# Patient Record
Sex: FEMALE | Race: WHITE | NOT HISPANIC OR LATINO | Employment: FULL TIME | URBAN - METROPOLITAN AREA
[De-identification: names, ages, dates, MRNs, and addresses within clinical notes are randomized per-mention and may not be internally consistent; named-entity substitution may affect disease eponyms.]

---

## 2017-07-17 ENCOUNTER — ALLSCRIPTS OFFICE VISIT (OUTPATIENT)
Dept: OTHER | Facility: OTHER | Age: 22
End: 2017-07-17

## 2017-07-17 PROCEDURE — G0145 SCR C/V CYTO,THINLAYER,RESCR: HCPCS | Performed by: PHYSICIAN ASSISTANT

## 2017-07-18 ENCOUNTER — LAB REQUISITION (OUTPATIENT)
Dept: LAB | Facility: HOSPITAL | Age: 22
End: 2017-07-18
Payer: COMMERCIAL

## 2017-07-18 DIAGNOSIS — Z01.419 ENCOUNTER FOR GYNECOLOGICAL EXAMINATION WITHOUT ABNORMAL FINDING: ICD-10-CM

## 2017-07-24 LAB
LAB AP GYN PRIMARY INTERPRETATION: NORMAL
LAB AP LMP: NORMAL
Lab: NORMAL

## 2017-11-14 ENCOUNTER — ALLSCRIPTS OFFICE VISIT (OUTPATIENT)
Dept: OTHER | Facility: OTHER | Age: 22
End: 2017-11-14

## 2017-11-16 NOTE — PROGRESS NOTES
Assessment    1  Folliculitis (074 8) (W57 9)    Discussion/Summary  Discussion Summary:   Folliculitiswarm soaks and try to express morerecollects RTO for I&D/excision  Chief Complaint  Chief Complaint Free Text Note Form: pt is here today for possible blocked hair follicle or skin tag      History of Present Illness  HPI: 26 y/o female c/o vuvlar lesion on R labia x 1 wk  Pt thought was pimple and tried to pop it but nothing came out  Pt denies any pain, vaginal d/c, vaginal odor, or vaginal itching  Pt has never had a similar lesion  Review of Systems  Focused-Female:  Constitutional: No fever, no chills, feels well, no tiredness, no recent weight gain or loss  Genitourinary: as noted in HPI,-- no dysuria-- and-- no vaginal discharge  Active Problems  1  ADD (attention deficit disorder) (314 00) (F98 8)   2  Asthma (493 90) (J45 909)   3  Dysmenorrhea in adolescent (625 3) (N94 6)   4  Encounter for gynecological examination without abnormal finding (V72 31) (Z01 419)   5  Scoliosis (737 30) (M41 9)    Past Medical History  1  History of dysmenorrhea (V13 29) (Z87 42)   2  History of ovarian cyst (V13 29) (Z87 42)  Active Problems And Past Medical History Reviewed: The active problems and past medical history were reviewed and updated today  Surgical History  1  History of Knee Surgery   2  History of Tonsillectomy   3  History of Wrist Surgery  Surgical History Reviewed: The surgical history was reviewed and updated today  Family History  Mother    1  No pertinent family history  Paternal Grandmother    2  Family history of diabetes mellitus (V18 0) (Z83 3)   3  Family history of malignant neoplasm of breast (V16 3) (Z80 3)  Paternal Aunt    4  Family history of malignant neoplasm of breast (V16 3) (Z80 3)   5  Family history of malignant neoplasm of thyroid (V16 8) (Z80 8)  Family History Reviewed: The family history was reviewed and updated today         Social History     ·

## 2017-11-21 ENCOUNTER — APPOINTMENT (OUTPATIENT)
Dept: RADIOLOGY | Facility: MEDICAL CENTER | Age: 22
End: 2017-11-21
Payer: COMMERCIAL

## 2017-11-21 ENCOUNTER — TRANSCRIBE ORDERS (OUTPATIENT)
Dept: ADMINISTRATIVE | Facility: HOSPITAL | Age: 22
End: 2017-11-21

## 2017-11-21 DIAGNOSIS — R07.81 PLEURODYNIA: Primary | ICD-10-CM

## 2017-11-21 PROCEDURE — 71020 HB CHEST X-RAY 2VW FRONTAL&LATL: CPT

## 2018-01-04 ENCOUNTER — APPOINTMENT (OUTPATIENT)
Dept: PHYSICAL THERAPY | Facility: MEDICAL CENTER | Age: 23
End: 2018-01-04
Payer: COMMERCIAL

## 2018-01-04 PROCEDURE — G8991 OTHER PT/OT GOAL STATUS: HCPCS

## 2018-01-04 PROCEDURE — G8990 OTHER PT/OT CURRENT STATUS: HCPCS

## 2018-01-04 PROCEDURE — 97162 PT EVAL MOD COMPLEX 30 MIN: CPT

## 2018-01-08 ENCOUNTER — APPOINTMENT (OUTPATIENT)
Dept: PHYSICAL THERAPY | Facility: MEDICAL CENTER | Age: 23
End: 2018-01-08
Payer: COMMERCIAL

## 2018-01-09 ENCOUNTER — APPOINTMENT (OUTPATIENT)
Dept: PHYSICAL THERAPY | Facility: MEDICAL CENTER | Age: 23
End: 2018-01-09
Payer: COMMERCIAL

## 2018-01-09 PROCEDURE — 97112 NEUROMUSCULAR REEDUCATION: CPT

## 2018-01-09 PROCEDURE — 97140 MANUAL THERAPY 1/> REGIONS: CPT

## 2018-01-11 ENCOUNTER — APPOINTMENT (OUTPATIENT)
Dept: PHYSICAL THERAPY | Facility: MEDICAL CENTER | Age: 23
End: 2018-01-11
Payer: COMMERCIAL

## 2018-01-11 PROCEDURE — 97110 THERAPEUTIC EXERCISES: CPT

## 2018-01-11 PROCEDURE — 97140 MANUAL THERAPY 1/> REGIONS: CPT

## 2018-01-12 ENCOUNTER — APPOINTMENT (OUTPATIENT)
Dept: PHYSICAL THERAPY | Facility: MEDICAL CENTER | Age: 23
End: 2018-01-12
Payer: COMMERCIAL

## 2018-01-13 VITALS
DIASTOLIC BLOOD PRESSURE: 60 MMHG | WEIGHT: 131 LBS | HEIGHT: 68 IN | BODY MASS INDEX: 19.85 KG/M2 | SYSTOLIC BLOOD PRESSURE: 96 MMHG

## 2018-01-14 VITALS
WEIGHT: 138 LBS | BODY MASS INDEX: 20.92 KG/M2 | SYSTOLIC BLOOD PRESSURE: 116 MMHG | DIASTOLIC BLOOD PRESSURE: 80 MMHG | HEIGHT: 68 IN

## 2018-01-16 ENCOUNTER — APPOINTMENT (OUTPATIENT)
Dept: PHYSICAL THERAPY | Facility: MEDICAL CENTER | Age: 23
End: 2018-01-16
Payer: COMMERCIAL

## 2018-01-16 PROCEDURE — 97140 MANUAL THERAPY 1/> REGIONS: CPT

## 2018-01-16 PROCEDURE — 97112 NEUROMUSCULAR REEDUCATION: CPT

## 2018-01-19 ENCOUNTER — APPOINTMENT (OUTPATIENT)
Dept: PHYSICAL THERAPY | Facility: MEDICAL CENTER | Age: 23
End: 2018-01-19
Payer: COMMERCIAL

## 2018-01-19 PROCEDURE — 97112 NEUROMUSCULAR REEDUCATION: CPT

## 2018-01-19 PROCEDURE — 97140 MANUAL THERAPY 1/> REGIONS: CPT

## 2018-01-22 ENCOUNTER — APPOINTMENT (OUTPATIENT)
Dept: PHYSICAL THERAPY | Facility: MEDICAL CENTER | Age: 23
End: 2018-01-22
Payer: COMMERCIAL

## 2018-01-22 PROCEDURE — 97140 MANUAL THERAPY 1/> REGIONS: CPT

## 2018-01-22 PROCEDURE — 97112 NEUROMUSCULAR REEDUCATION: CPT

## 2018-01-25 ENCOUNTER — OFFICE VISIT (OUTPATIENT)
Dept: PHYSICAL THERAPY | Facility: MEDICAL CENTER | Age: 23
End: 2018-01-25
Payer: COMMERCIAL

## 2018-01-25 DIAGNOSIS — M75.91 LESION OF RIGHT SHOULDER: Primary | ICD-10-CM

## 2018-01-25 PROCEDURE — 97140 MANUAL THERAPY 1/> REGIONS: CPT | Performed by: PHYSICAL THERAPIST

## 2018-01-25 PROCEDURE — 97110 THERAPEUTIC EXERCISES: CPT | Performed by: PHYSICAL THERAPIST

## 2018-01-25 NOTE — PROGRESS NOTES
Daily Note     Today's date: 2018  Patient name: Linda Young  : 1995  MRN: [id number]  Referring provider: Nick Drew MD  Dx:   Encounter Diagnosis   Name Primary?  Lesion of right shoulder Yes                  Subjective: Pt notes that she is doing well today, and overall she is feeling better over the past few days  Objective: See treatment diary below      Assessment: Tolerated treatment well  Patient demonstrated fatigue post treatment, exhibited good technqiue with therapeutic exercises, could benefit from continued PT and progressed patient with additional [address] activities  She remains limited with [employer] compensation when challenged  Will continue to work on form  Plan: Continue per plan of care  and Progress treatment as tolerated         Precautions: asthma, ADD    Daily Treatment Diary     Manual  18            ld PROM 10'                                                                    Exercise Diary  18            pulley's 5'            Tb rows/ext GTB 20x ea            S/l shld ER 1# 30x            Sleeper stretch 10" x10            Prone I, T's 5" x15 ea            Low trap lifts RTB 15x            Ball on wall circles Cw/CCw 20x            tband ER RTB 20x              Supine alphabet 1# 2x                                                                                                                                                                Modalities              CP PRN

## 2018-01-29 ENCOUNTER — OFFICE VISIT (OUTPATIENT)
Dept: PHYSICAL THERAPY | Facility: MEDICAL CENTER | Age: 23
End: 2018-01-29
Payer: COMMERCIAL

## 2018-01-29 DIAGNOSIS — M75.91 LESION OF RIGHT SHOULDER: Primary | ICD-10-CM

## 2018-01-29 PROCEDURE — 97110 THERAPEUTIC EXERCISES: CPT | Performed by: PHYSICAL THERAPIST

## 2018-01-29 PROCEDURE — 97140 MANUAL THERAPY 1/> REGIONS: CPT | Performed by: PHYSICAL THERAPIST

## 2018-01-29 NOTE — PROGRESS NOTES
Daily Note     Today's date: 2018  Patient name: Linda Young  : 1995  MRN: [id number]  Referring provider: Nick Drew MD  Dx:   Encounter Diagnosis   Name Primary?  Lesion of right shoulder Yes                  Subjective: Pt reports that she is more sore today  She was very active over the weekend, between making periogies and painting  Objective: See treatment diary below      Assessment: Tolerated treatment well  Patient demonstrated fatigue post treatment, could benefit from continued PT and did not progress with OH activities due to irritation from this weekend  Will progress NV with TE as charged  Plan: Continue per plan of care  and Progress treatment as tolerated         Precautions: asthma, ADD    Daily Treatment Diary     Manual  18           shld PROM 10' 10'                                                                   Exercise Diary  18           pulley's 5' 5'           Tb rows/ext GTB 20x ea GTB 20x ea           S/l shld ER 1# 30x 1# 30x           Sleeper stretch 10" x10 10" x10           Prone I, T's 5" x15 ea 5" x15 ea           Low trap lifts RTB 15x RTB 15x           Ball on wall circles Cw/CCw 20x CW/CCW 20x           tband ER RTB 20x   RTB 20x           Supine alphabet 1# 2x  1# 2x           Bicep curls  5# 10x                                                                                                                                                 Modalities              CP PRN

## 2018-02-01 ENCOUNTER — OFFICE VISIT (OUTPATIENT)
Dept: PHYSICAL THERAPY | Facility: MEDICAL CENTER | Age: 23
End: 2018-02-01
Payer: COMMERCIAL

## 2018-02-01 DIAGNOSIS — M75.91 LESION OF RIGHT SHOULDER: Primary | ICD-10-CM

## 2018-02-01 PROCEDURE — 97110 THERAPEUTIC EXERCISES: CPT | Performed by: PHYSICAL THERAPIST

## 2018-02-01 PROCEDURE — 97140 MANUAL THERAPY 1/> REGIONS: CPT | Performed by: PHYSICAL THERAPIST

## 2018-02-01 NOTE — PROGRESS NOTES
Daily Note     Today's date: 2018  Patient name: Oly Johns  : 1995  MRN: [id number]  Referring provider: Saurabh Camarillo MD  Dx:   Encounter Diagnosis   Name Primary?  Lesion of right shoulder Yes       Start Time: 164  Stop Time: 1730  Total time in clinic (min): 45 minutes    Subjective: Pt notes that she is feeling well today, and denies significant pain  Objective: See treatment diary below      Assessment: Tolerated treatment well  Patient demonstrated fatigue post treatment, would benefit from continued PT and was challenged with [facility] [employer] press  Plan: Continue per plan of care  and Progress treatment as tolerated         Precautions: asthma, ADD    Daily Treatment Diary     Manual            shld PROM 10' 10' 10'                                                                   Exercise Diary            pulley's 5' 5' 5'          Tb rows/ext GTB 20x ea GTB 20x ea GTB 20x          S/l shld ER 1# 30x 1# 30x 1# 30x          Sleeper stretch 10" x10 10" x10 10" x10          Prone I, T's 5" x15 ea 5" x15 ea 5" x15 1# ea          Low trap lifts RTB 15x RTB 15x RTB 15x          Ball on wall circles Cw/CCw 20x CW/CCW 20x CW/CCW 20x          tband ER RTB 20x   RTB 20x RTB 20x          Supine alphabet 1# 2x  1# 2x 1# 2x           Bicep curls  5# 10x 5# 15x          KB press   3# 10x                                                                                                                                   Modalities              CP PRN

## 2018-02-07 ENCOUNTER — APPOINTMENT (OUTPATIENT)
Dept: PHYSICAL THERAPY | Facility: MEDICAL CENTER | Age: 23
End: 2018-02-07
Payer: COMMERCIAL

## 2018-02-08 ENCOUNTER — OFFICE VISIT (OUTPATIENT)
Dept: PHYSICAL THERAPY | Facility: MEDICAL CENTER | Age: 23
End: 2018-02-08
Payer: COMMERCIAL

## 2018-02-08 DIAGNOSIS — M75.91 LESION OF RIGHT SHOULDER: Primary | ICD-10-CM

## 2018-02-08 PROCEDURE — 97140 MANUAL THERAPY 1/> REGIONS: CPT

## 2018-02-08 PROCEDURE — 97110 THERAPEUTIC EXERCISES: CPT

## 2018-02-08 NOTE — PROGRESS NOTES
Daily Note     Today's date: 2018  Patient name: Magno Cartagena  : 1995  MRN: [id number]  Referring provider: Tay Andersen MD  Dx:   Encounter Diagnosis   Name Primary?  Lesion of right shoulder Yes                  Subjective: Pt denies pain in shoulder today  Objective: See treatment diary below      Assessment: Tolerated treatment well  Challenged w/ Y's        Plan: Continue per plan of care  and Progress treatment as tolerated         Precautions: asthma, ADD    Daily Treatment Diary     Manual           shld PROM 10' 10' 10'  10'                                                                 Exercise Diary           pulley's 5' 5' 5' 5 min         Tb rows/ext GTB 20x ea GTB 20x ea GTB 20x GTB x20 ea         S/l shld ER 1# 30x 1# 30x 1# 30x 1# x30         Sleeper stretch 10" x10 10" x10 10" x10 10 sec x10         Prone I, T's 5" x15 ea 5" x15 ea 5" x15 1# ea 5 sec x15 ea         Low trap lifts RTB 15x RTB 15x RTB 15x RTB x15         Ball on wall circles Cw/CCw 20x CW/CCW 20x CW/CCW 20x CW/CCW 20x         tband ER RTB 20x   RTB 20x RTB 20x RTB x20         Supine alphabet 1# 2x  1# 2x 1# 2x  1# 2x         Bicep curls  5# 10x 5# 15x 5# x15         KB press   3# 10x 3# 10x                                                                                                                                  Modalities              CP PRN

## 2018-02-09 ENCOUNTER — APPOINTMENT (OUTPATIENT)
Dept: PHYSICAL THERAPY | Facility: MEDICAL CENTER | Age: 23
End: 2018-02-09
Payer: COMMERCIAL

## 2018-02-14 ENCOUNTER — OFFICE VISIT (OUTPATIENT)
Dept: PHYSICAL THERAPY | Facility: MEDICAL CENTER | Age: 23
End: 2018-02-14
Payer: COMMERCIAL

## 2018-02-14 DIAGNOSIS — M75.91 LESION OF RIGHT SHOULDER: Primary | ICD-10-CM

## 2018-02-14 PROCEDURE — 97110 THERAPEUTIC EXERCISES: CPT | Performed by: PHYSICAL THERAPIST

## 2018-02-14 PROCEDURE — 97140 MANUAL THERAPY 1/> REGIONS: CPT | Performed by: PHYSICAL THERAPIST

## 2018-02-14 NOTE — PROGRESS NOTES
Daily Note     Today's date: 2018  Patient name: Joanie Velasquez  : 1995  MRN: [id number]  Referring provider: Lisa Zarco MD  Dx:   Encounter Diagnosis   Name Primary?  Lesion of right shoulder Yes                  Subjective: Pt notes that her shoulder is doing well  She is most limited with [place] activities  Objective: See treatment diary below      Assessment: Tolerated treatment well  Progressed patient with increased weights and wall slides to work on [employer] (1BOG) activities with good tolerance  She would continue to benefit from skilled care  Plan: Continue per plan of care  and Progress treatment as tolerated         Precautions: asthma, ADD    Daily Treatment Diary     Manual          shld PROM 10' 10' 10'  10' 10'                                                                Exercise Diary          pulley's 5' 5' 5' 5 min 5'        Tb rows/ext GTB 20x ea GTB 20x ea GTB 20x GTB x20 ea GTB x20 ea        S/l shld ER 1# 30x 1# 30x 1# 30x 1# x30 1# 30x        Sleeper stretch 10" x10 10" x10 10" x10 10 sec x10 HEP        Prone I, T, Y's 5" x15 ea 5" x15 ea 5" x15 1# ea 5 sec x15 ea 5" x15 ea 1#        Low trap lifts RTB 15x RTB 15x RTB 15x RTB x15 RTB 15x        Ball on wall circles Cw/CCw 20x CW/CCW 20x CW/CCW 20x CW/CCW 20x CW/CCW 20x        tband ER RTB 20x   RTB 20x RTB 20x RTB x20 RTB 20x        Supine alphabet 1# 2x  1# 2x 1# 2x  1# 2x 1# 2x        Bicep curls  5# 10x 5# 15x 5# x15 7# x15        KB press   3# 10x 3# 10x 3# 10x        Wall slides     10x                                                                                                                    Modalities              CP PRN

## 2018-02-16 ENCOUNTER — APPOINTMENT (OUTPATIENT)
Dept: PHYSICAL THERAPY | Facility: MEDICAL CENTER | Age: 23
End: 2018-02-16
Payer: COMMERCIAL

## 2018-02-21 ENCOUNTER — OFFICE VISIT (OUTPATIENT)
Dept: PHYSICAL THERAPY | Facility: MEDICAL CENTER | Age: 23
End: 2018-02-21
Payer: COMMERCIAL

## 2018-02-21 DIAGNOSIS — M75.91 LESION OF RIGHT SHOULDER: Primary | ICD-10-CM

## 2018-02-21 PROCEDURE — 97110 THERAPEUTIC EXERCISES: CPT | Performed by: PHYSICAL THERAPIST

## 2018-02-21 PROCEDURE — 97140 MANUAL THERAPY 1/> REGIONS: CPT | Performed by: PHYSICAL THERAPIST

## 2018-02-21 NOTE — PROGRESS NOTES
Daily Note     Today's date: 2018  Patient name: Tim Vang  : 1995  MRN: [id number]  Referring provider: Pawan Hart MD  Dx:   Encounter Diagnosis   Name Primary?  Lesion of right shoulder Yes                  Subjective: Pt notes that her primary limitation is anterior shoulder pain, particularly that occurs when she is doing repetitive or overhead activities  Objective: See treatment diary below      Assessment: Tolerated treatment well  She demonstrated irritation and pain over the LHB and pec musculature  Added an anterior pec stretch with good tolerance  Plan: Continue per plan of care  and Progress treatment as tolerated         Precautions: asthma, ADD    Daily Treatment Diary     Manual         shld PROM 10' 10' 10'  10' 10' 10'       Deep tissue deformation to anterior pec      5'                                                  Exercise Diary         pulley's 5' 5' 5' 5 min 5' 5'       Tb rows/ext GTB 20x ea GTB 20x ea GTB 20x GTB x20 ea GTB x20 ea GTB 20xea       S/l shld ER 1# 30x 1# 30x 1# 30x 1# x30 1# 30x 2# 30x       Sleeper stretch 10" x10 10" x10 10" x10 10 sec x10 HEP HEP       Prone I, T, Y's 5" x15 ea 5" x15 ea 5" x15 1# ea 5 sec x15 ea 5" x15 ea 1# 5" x15 1# ea       Low trap lifts RTB 15x RTB 15x RTB 15x RTB x15 RTB 15x RTB 15x       Ball on wall circles Cw/CCw 20x CW/CCW 20x CW/CCW 20x CW/CCW 20x CW/CCW 20x CW/CCW 20x       tband ER RTB 20x   RTB 20x RTB 20x RTB x20 RTB 20x GTB 20x       Supine alphabet 1# 2x  1# 2x 1# 2x  1# 2x 1# 2x 1# 2x       Bicep curls  5# 10x 5# 15x 5# x15 7# x15 7# x15       KB press   3# 10x 3# 10x 3# 10x 3# 10x       Wall slides     10x 10x       Corner pec stretch      15" x4       UBE      NV                                                                                         Modalities              CP PRN

## 2018-02-23 ENCOUNTER — APPOINTMENT (OUTPATIENT)
Dept: PHYSICAL THERAPY | Facility: MEDICAL CENTER | Age: 23
End: 2018-02-23
Payer: COMMERCIAL

## 2018-02-26 ENCOUNTER — TRANSCRIBE ORDERS (OUTPATIENT)
Dept: ADMINISTRATIVE | Facility: HOSPITAL | Age: 23
End: 2018-02-26

## 2018-02-26 ENCOUNTER — TRANSCRIBE ORDERS (OUTPATIENT)
Dept: URGENT CARE | Facility: CLINIC | Age: 23
End: 2018-02-26

## 2018-02-26 ENCOUNTER — CLINICAL SUPPORT (OUTPATIENT)
Dept: URGENT CARE | Facility: CLINIC | Age: 23
End: 2018-02-26
Payer: COMMERCIAL

## 2018-02-26 DIAGNOSIS — Z00.00 NORMAL PHYSICAL EXAM: ICD-10-CM

## 2018-02-26 DIAGNOSIS — Z92.29: Primary | ICD-10-CM

## 2018-02-26 DIAGNOSIS — Z00.00 NORMAL PHYSICAL EXAM: Primary | ICD-10-CM

## 2018-02-26 DIAGNOSIS — Z23 NEED FOR DIPHTHERIA-TETANUS-PERTUSSIS (TDAP) VACCINE: Primary | ICD-10-CM

## 2018-02-26 PROCEDURE — 86480 TB TEST CELL IMMUN MEASURE: CPT

## 2018-02-26 PROCEDURE — 86787 VARICELLA-ZOSTER ANTIBODY: CPT

## 2018-02-27 LAB — VZV IGG SER IA-ACNC: NORMAL

## 2018-02-28 ENCOUNTER — EVALUATION (OUTPATIENT)
Dept: PHYSICAL THERAPY | Facility: MEDICAL CENTER | Age: 23
End: 2018-02-28
Payer: COMMERCIAL

## 2018-02-28 DIAGNOSIS — M75.91 LESION OF RIGHT SHOULDER: Primary | ICD-10-CM

## 2018-02-28 LAB
ANNOTATION COMMENT IMP: NORMAL
GAMMA INTERFERON BACKGROUND BLD IA-ACNC: 0.03 IU/ML
M TB IFN-G BLD-IMP: NEGATIVE
M TB IFN-G CD4+ BCKGRND COR BLD-ACNC: 0 IU/ML
M TB IFN-G CD4+ T-CELLS BLD-ACNC: 0.03 IU/ML
MITOGEN IGNF BLD-ACNC: 7.39 IU/ML
QUANTIFERON-TB GOLD IN TUBE: NORMAL
SERVICE CMNT-IMP: NORMAL

## 2018-02-28 PROCEDURE — 97140 MANUAL THERAPY 1/> REGIONS: CPT | Performed by: PHYSICAL THERAPIST

## 2018-02-28 PROCEDURE — 97112 NEUROMUSCULAR REEDUCATION: CPT | Performed by: PHYSICAL THERAPIST

## 2018-02-28 PROCEDURE — G8990 OTHER PT/OT CURRENT STATUS: HCPCS | Performed by: PHYSICAL THERAPIST

## 2018-02-28 PROCEDURE — G8991 OTHER PT/OT GOAL STATUS: HCPCS | Performed by: PHYSICAL THERAPIST

## 2018-02-28 NOTE — LETTER
2018    MD Padmini White [address] [facility] [address]    Patient: Sergio Alcantar   YOB: 1995   Date of Visit: 2018     Encounter Diagnosis     ICD-10-CM    1  Lesion of right shoulder M75 91        Dear Dr Shay Mcmanus:    Please review the attached Plan of Care from Wild Saucedo's recent visit  Please verify that you agree therapy should continue by signing the attached document and sending it back to our office  If you have any questions or concerns, please don't hesitate to call  Sincerely,    Oanh Tejada PT      Referring Provider:      I certify that I have read the below Plan of Care and certify the need for these services furnished under this plan of treatment while under my care  MD Padmini White [place] [address]: [phone]          PT Re-Evaluation     Today's date: 2018  Patient name: Sergio Alcantar  : 1995  MRN: [id number]  Referring provider: John Kumari MD  Dx:   Encounter Diagnosis     ICD-10-CM    1  Lesion of right shoulder M75 91                   Assessment  Impairments: abnormal muscle tone, abnormal movement, impaired physical strength, lacks appropriate home exercise program and scapular dyskinesis    Assessment details: Sergio Alcantar has attended 13 visits since initiating skilled PT and has demonstrated overall improvement in mobility, strength, and function, with a reduction in pain  Currently, she has made steady progress towards her goals, but continue to remain limited with strength, overhead lifting, and anterior shoulder irritation  At this time, skilled physical therapy is warranted in order to address their remaining impairments and aide in return to functional activities  It is recommended that she continue to be seen 1x/week for an additional 4 weeks  Thank you for this pleasant referral!   Understanding of Dx/Px/POC: excellent   Prognosis: good    Goals  1  Patient independent with her HEP- Partially MET  2  Pt will report decreased levels of pain by at least 2 subjective ratings in 4 weeks- MET  3  Pt will demonstrate normal shoulder PROM in 4 weeks- MET    Plan  Patient would benefit from: skilled PT  Referral necessary: No  Planned modality interventions: TENS, cryotherapy, biofeedback and thermotherapy: hydrocollator packs  Planned therapy interventions: joint mobilization, manual therapy, massage, neuromuscular re-education, patient education, postural training, strengthening, stretching, therapeutic activities, therapeutic exercise, home exercise program and ADL training  Frequency: 1x week  Duration in weeks: 4  Treatment plan discussed with: patient        Subjective Evaluation    History of Present Illness  Mechanism of injury: surgery  Mechanism of injury: Pt notes overall she has noticed improvement in her symptoms and function  She still has weakness, particularly when reaching or lifting anything overhead  She notes limitations with any thing that is 20# or greater  She also still gets pain and irritation into her anterior shoulder  Quality of life: excellent    Pain  Current pain ratin  At best pain ratin  At worst pain ratin  Location: anterior shoulder  Quality: dull ache  Aggravating factors: overhead activity    Hand dominance: right          Objective     Palpation     Right Tenderness of the pectoralis major and pectoralis minor  Tenderness     Right Shoulder  Tenderness in the biceps tendon (proximal)       Active Range of Motion   Left Shoulder   Normal active range of motion    Right Shoulder   Normal active range of motion    Strength/Myotome Testing     Left Shoulder   Normal muscle strength    Right Shoulder     Planes of Motion   Flexion: 4-   Abduction: 4-   External rotation at 0°:  4-   Internal rotation at 0°:  4-       Flowsheet Rows    Flowsheet Row Most Recent Value   PT/OT G-Codes   Current Score  58   Projected Score 68   FOTO information reviewed  Yes   Assessment Type  Re-evaluation   G code set  Other PT/OT Primary   Other PT Primary Current Status ()  CK   Other PT Primary Goal Status ()  CI          Precautions: asthma, ADD     Daily Treatment Diary      Manual  1/28 1/29 2/1 2/8 2/14 2/21 2/28         shld PROM 10' 10' 10'  10' 10' 10'  10'         Deep tissue deformation to anterior pec           5'  5'                                                                                       Exercise Diary  1/28 1/29 2/1 2/8 2/14 2/21 2/28         pulley's 5' 5' 5' 5 min 5' 5'  5'         Tb rows/ext GTB 20x ea GTB 20x ea GTB 20x GTB x20 ea GTB x20 ea GTB 20xea  d/c         S/l shld ER 1# 30x 1# 30x 1# 30x 1# x30 1# 30x 2# 30x  2# 30x         Supine butterfly        5" x10         Prone I, T, Y's 5" x15 ea 5" x15 ea 5" x15 1# ea 5 sec x15 ea 5" x15 ea 1# 5" x15 1# ea  5" x15 1#         Low trap lifts RTB 15x RTB 15x RTB 15x RTB x15 RTB 15x RTB 15x  RTB 15x         Ball on wall circles Cw/CCw 20x CW/CCW 20x CW/CCW 20x CW/CCW 20x CW/CCW 20x CW/CCW 20x  CW/CCW 20x         tband ER RTB 20x    RTB 20x RTB 20x RTB x20 RTB 20x GTB 20x  d/c         Supine alphabet 1# 2x  1# 2x 1# 2x  1# 2x 1# 2x 1# 2x  2# 2x         Bicep curls   5# 10x 5# 15x 5# x15 7# x15 7# x15  7# 20x         KB press     3# 10x 3# 10x 3# 10x 3# 10x  3# 10x         Wall slides         10x 10x  10x         Corner pec stretch           15" x4  HEP         UBE (retro)           NV  3'                                                                                                                                                                Modalities                        CP PRN

## 2018-02-28 NOTE — PROGRESS NOTES
PT Re-Evaluation     Today's date: 2018  Patient name: Michael Villeda  : 1995  MRN: [id number]  Referring provider: Philip Uriarte MD  Dx:   Encounter Diagnosis     ICD-10-CM    1  Lesion of right shoulder M75 91                   Assessment  Impairments: abnormal muscle tone, abnormal movement, impaired physical strength, lacks appropriate home exercise program and scapular dyskinesis    Assessment details: Michael Villeda has attended 13 visits since initiating skilled PT and has demonstrated overall improvement in mobility, strength, and function, with a reduction in pain  Currently, she has made steady progress towards her goals, but continue to remain limited with strength, overhead lifting, and anterior shoulder irritation  At this time, skilled physical therapy is warranted in order to address their remaining impairments and aide in return to functional activities  It is recommended that she continue to be seen 1x/week for an additional 4 weeks  Thank you for this pleasant referral!   Understanding of Dx/Px/POC: excellent   Prognosis: good    Goals  1  Patient independent with her HEP- Partially MET  2  Pt will report decreased levels of pain by at least 2 subjective ratings in 4 weeks- MET  3   Pt will demonstrate normal shoulder PROM in 4 weeks- MET    Plan  Patient would benefit from: skilled PT  Referral necessary: No  Planned modality interventions: TENS, cryotherapy, biofeedback and thermotherapy: hydrocollator packs  Planned therapy interventions: joint mobilization, manual therapy, massage, neuromuscular re-education, patient education, postural training, strengthening, stretching, therapeutic activities, therapeutic exercise, home exercise program and ADL training  Frequency: 1x week  Duration in weeks: 4  Treatment plan discussed with: patient        Subjective Evaluation    History of Present Illness  Mechanism of injury: surgery  Mechanism of injury: Pt notes overall she has noticed improvement in her symptoms and function  She still has weakness, particularly when reaching or lifting anything overhead  She notes limitations with any thing that is 20# or greater  She also still gets pain and irritation into her anterior shoulder  Quality of life: excellent    Pain  Current pain ratin  At best pain ratin  At worst pain ratin  Location: anterior shoulder  Quality: dull ache  Aggravating factors: overhead activity    Hand dominance: right          Objective     Palpation     Right Tenderness of the pectoralis major and pectoralis minor  Tenderness     Right Shoulder  Tenderness in the biceps tendon (proximal)       Active Range of Motion   Left Shoulder   Normal active range of motion    Right Shoulder   Normal active range of motion    Strength/Myotome Testing     Left Shoulder   Normal muscle strength    Right Shoulder     Planes of Motion   Flexion: 4-   Abduction: 4-   External rotation at 0°: 4-   Internal rotation at 0°: 4-       Flowsheet Rows    Flowsheet Row Most Recent Value   PT/OT G-Codes   Current Score  58   Projected Score  77   FOTO information reviewed  Yes   Assessment Type  Re-evaluation   G code set  Other PT/OT Primary   Other PT Primary Current Status ()  CK   Other PT Primary Goal Status ()  CI          Precautions: asthma, ADD     Daily Treatment Diary      Manual           shld PROM 10' 10' 10'  10' 10' 10'  10'         Deep tissue deformation to anterior pec           5'  5'                                                                                       Exercise Diary           pulley's 5' 5' 5' 5 min 5' 5'  5'         Tb rows/ext GTB 20x ea GTB 20x ea GTB 20x GTB x20 ea GTB x20 ea GTB 20xea  d/c         S/l shld ER 1# 30x 1# 30x 1# 30x 1# x30 1# 30x 2# 30x  2# 30x         Supine butterfly        5" x10         Prone I, T, Y's 5" x15 ea 5" x15 ea 5" x15 1# ea 5 sec x15 ea 5" x15 ea 1# 5" x15 1# ea  5" x15 1#         Low trap lifts RTB 15x RTB 15x RTB 15x RTB x15 RTB 15x RTB 15x  RTB 15x         Ball on wall circles Cw/CCw 20x CW/CCW 20x CW/CCW 20x CW/CCW 20x CW/CCW 20x CW/CCW 20x  CW/CCW 20x         tband ER RTB 20x    RTB 20x RTB 20x RTB x20 RTB 20x GTB 20x  d/c         Supine alphabet 1# 2x  1# 2x 1# 2x  1# 2x 1# 2x 1# 2x  2# 2x         Bicep curls   5# 10x 5# 15x 5# x15 7# x15 7# x15  7# 20x         KB press     3# 10x 3# 10x 3# 10x 3# 10x  3# 10x         Wall slides         10x 10x  10x         Corner pec stretch           15" x4  HEP         UBE (retro)           NV  3'                                                                                                                                                                Modalities                        CP PRN

## 2018-03-01 ENCOUNTER — TRANSCRIBE ORDERS (OUTPATIENT)
Dept: PHYSICAL THERAPY | Facility: MEDICAL CENTER | Age: 23
End: 2018-03-01

## 2018-03-01 DIAGNOSIS — M75.90 LESION OF SHOULDER, UNSPECIFIED LATERALITY: Primary | ICD-10-CM

## 2018-03-02 ENCOUNTER — APPOINTMENT (OUTPATIENT)
Dept: PHYSICAL THERAPY | Facility: MEDICAL CENTER | Age: 23
End: 2018-03-02
Payer: COMMERCIAL

## 2018-03-07 ENCOUNTER — APPOINTMENT (OUTPATIENT)
Dept: PHYSICAL THERAPY | Facility: MEDICAL CENTER | Age: 23
End: 2018-03-07
Payer: COMMERCIAL

## 2018-03-08 ENCOUNTER — OFFICE VISIT (OUTPATIENT)
Dept: PHYSICAL THERAPY | Facility: MEDICAL CENTER | Age: 23
End: 2018-03-08
Payer: COMMERCIAL

## 2018-03-08 DIAGNOSIS — M75.91 LESION OF RIGHT SHOULDER: Primary | ICD-10-CM

## 2018-03-08 PROCEDURE — 97110 THERAPEUTIC EXERCISES: CPT | Performed by: PHYSICAL THERAPIST

## 2018-03-08 PROCEDURE — 97140 MANUAL THERAPY 1/> REGIONS: CPT | Performed by: PHYSICAL THERAPIST

## 2018-03-08 NOTE — PROGRESS NOTES
Daily Note     Today's date: 3/8/2018  Patient name: Joanie Velasquez  : 1995  MRN: [id number]  Referring provider: Lisa Zarco MD  Dx:   Encounter Diagnosis     ICD-10-CM    1  Lesion of right shoulder M75 91                   Subjective: Pt notes that she is doing well, but  continues to have persistent stiffness and soreness in her anterior shoulder  Objective: See treatment diary below      Assessment: Tolerated treatment well  Patient exhibited good technique with therapeutic exercises, would benefit from continued PT and continues to demonstrate increased tissue density in anterior pec  Plan: Continue per plan of care  Progress treatment as tolerated        Precautions: asthma, ADD     Daily Treatment Diary      Manual  1/28 1/29 2/1 2/8 2/14 2/21  2/28  3/8       shld PROM 10' 10' 10'  10' 10' 10'  10'  10'       Deep tissue deformation to anterior pec           5'  5'  5'                                                                                     Exercise Diary  1/28 1/29 2/1 2/8 2/14 2/21  2/28  3/8       pulley's 5' 5' 5' 5 min 5' 5'  5'  5'       Tb rows/ext GTB 20x ea GTB 20x ea GTB 20x GTB x20 ea GTB x20 ea GTB 20xea  d/c  d/c       S/l shld ER 1# 30x 1# 30x 1# 30x 1# x30 1# 30x 2# 30x  2# 30x  2# 30x       Supine butterfly              5" x10 5" x10       Prone I, T, Y's 5" x15 ea 5" x15 ea 5" x15 1# ea 5 sec x15 ea 5" x15 ea 1# 5" x15 1# ea  5" x15 1# 5" x15 1#       Low trap lifts RTB 15x RTB 15x RTB 15x RTB x15 RTB 15x RTB 15x  RTB 15x RTB15x       Ball on wall circles Cw/CCw 20x CW/CCW 20x CW/CCW 20x CW/CCW 20x CW/CCW 20x CW/CCW 20x  CW/CCW 20x  20x ea       tband ER RTB 20x    RTB 20x RTB 20x RTB x20 RTB 20x GTB 20x  d/c  d/c       Supine alphabet 1# 2x  1# 2x 1# 2x  1# 2x 1# 2x 1# 2x  2# 2x  2# 2x       Bicep curls   5# 10x 5# 15x 5# x15 7# x15 7# x15  7# 20x 7# 20x       KB press     3# 10x 3# 10x 3# 10x 3# 10x  3# 10x 3# 10x       Wall slides         10x 10x  10x 10x       Corner pec stretch           15" x4  HEP  HEP       UBE (retro)           NV  3'  3'                                                                                                                                                             Modalities                        CP PRN

## 2018-03-09 ENCOUNTER — APPOINTMENT (OUTPATIENT)
Dept: PHYSICAL THERAPY | Facility: MEDICAL CENTER | Age: 23
End: 2018-03-09
Payer: COMMERCIAL

## 2018-03-10 ENCOUNTER — APPOINTMENT (OUTPATIENT)
Dept: LAB | Facility: MEDICAL CENTER | Age: 23
End: 2018-03-10
Payer: COMMERCIAL

## 2018-03-10 ENCOUNTER — HOSPITAL ENCOUNTER (EMERGENCY)
Facility: HOSPITAL | Age: 23
Discharge: HOME | End: 2018-03-10
Attending: EMERGENCY MEDICINE | Admitting: EMERGENCY MEDICINE
Payer: COMMERCIAL

## 2018-03-10 ENCOUNTER — TRANSCRIBE ORDERS (OUTPATIENT)
Dept: ADMINISTRATIVE | Facility: HOSPITAL | Age: 23
End: 2018-03-10

## 2018-03-10 VITALS
BODY MASS INDEX: 21.22 KG/M2 | OXYGEN SATURATION: 100 % | DIASTOLIC BLOOD PRESSURE: 73 MMHG | WEIGHT: 140 LBS | HEART RATE: 75 BPM | RESPIRATION RATE: 14 BRPM | TEMPERATURE: 98.1 F | SYSTOLIC BLOOD PRESSURE: 120 MMHG | HEIGHT: 68 IN

## 2018-03-10 DIAGNOSIS — L50.9 URTICARIA: Primary | ICD-10-CM

## 2018-03-10 DIAGNOSIS — M25.50 PAIN IN JOINT, MULTIPLE SITES: ICD-10-CM

## 2018-03-10 DIAGNOSIS — H91.90 FAMILIAL AMYLOID NEPHROPATHY WITH URTICARIA AND DEAFNESS (HCC): Primary | ICD-10-CM

## 2018-03-10 DIAGNOSIS — L50.9 FAMILIAL AMYLOID NEPHROPATHY WITH URTICARIA AND DEAFNESS (HCC): ICD-10-CM

## 2018-03-10 DIAGNOSIS — E85.0 FAMILIAL AMYLOID NEPHROPATHY WITH URTICARIA AND DEAFNESS (HCC): Primary | ICD-10-CM

## 2018-03-10 DIAGNOSIS — L50.9 FAMILIAL AMYLOID NEPHROPATHY WITH URTICARIA AND DEAFNESS (HCC): Primary | ICD-10-CM

## 2018-03-10 DIAGNOSIS — E85.0 FAMILIAL AMYLOID NEPHROPATHY WITH URTICARIA AND DEAFNESS (HCC): ICD-10-CM

## 2018-03-10 DIAGNOSIS — H91.90 FAMILIAL AMYLOID NEPHROPATHY WITH URTICARIA AND DEAFNESS (HCC): ICD-10-CM

## 2018-03-10 LAB
ALBUMIN SERPL BCP-MCNC: 4.2 G/DL (ref 3.5–5)
ALP SERPL-CCNC: 89 U/L (ref 46–116)
ALT SERPL W P-5'-P-CCNC: 28 U/L (ref 12–78)
ANION GAP SERPL CALCULATED.3IONS-SCNC: 7 MMOL/L (ref 4–13)
AST SERPL W P-5'-P-CCNC: 32 U/L (ref 5–45)
BASOPHILS # BLD AUTO: 0.01 THOUSANDS/ΜL (ref 0–0.1)
BASOPHILS NFR BLD AUTO: 0 % (ref 0–1)
BILIRUB SERPL-MCNC: 0.48 MG/DL (ref 0.2–1)
BUN SERPL-MCNC: 7 MG/DL (ref 5–25)
CALCIUM SERPL-MCNC: 9.3 MG/DL (ref 8.3–10.1)
CHLORIDE SERPL-SCNC: 106 MMOL/L (ref 100–108)
CO2 SERPL-SCNC: 26 MMOL/L (ref 21–32)
CREAT SERPL-MCNC: 0.9 MG/DL (ref 0.6–1.3)
CRP SERPL QL: 9.3 MG/L
EOSINOPHIL # BLD AUTO: 0 THOUSAND/ΜL (ref 0–0.61)
EOSINOPHIL NFR BLD AUTO: 0 % (ref 0–6)
ERYTHROCYTE [DISTWIDTH] IN BLOOD BY AUTOMATED COUNT: 13.6 % (ref 11.6–15.1)
ERYTHROCYTE [SEDIMENTATION RATE] IN BLOOD: 7 MM/HOUR (ref 0–20)
GFR SERPL CREATININE-BSD FRML MDRD: 91 ML/MIN/1.73SQ M
GLUCOSE P FAST SERPL-MCNC: 115 MG/DL (ref 65–99)
HCT VFR BLD AUTO: 38.7 % (ref 34.8–46.1)
HGB BLD-MCNC: 12.7 G/DL (ref 11.5–15.4)
LYMPHOCYTES # BLD AUTO: 0.87 THOUSANDS/ΜL (ref 0.6–4.47)
LYMPHOCYTES NFR BLD AUTO: 14 % (ref 14–44)
MCH RBC QN AUTO: 29.1 PG (ref 26.8–34.3)
MCHC RBC AUTO-ENTMCNC: 32.8 G/DL (ref 31.4–37.4)
MCV RBC AUTO: 89 FL (ref 82–98)
MONOCYTES # BLD AUTO: 0.05 THOUSAND/ΜL (ref 0.17–1.22)
MONOCYTES NFR BLD AUTO: 1 % (ref 4–12)
NEUTROPHILS # BLD AUTO: 5.37 THOUSANDS/ΜL (ref 1.85–7.62)
NEUTS SEG NFR BLD AUTO: 85 % (ref 43–75)
NRBC BLD AUTO-RTO: 0 /100 WBCS
PLATELET # BLD AUTO: 333 THOUSANDS/UL (ref 149–390)
PMV BLD AUTO: 11.3 FL (ref 8.9–12.7)
POTASSIUM SERPL-SCNC: 4.4 MMOL/L (ref 3.5–5.3)
PROT SERPL-MCNC: 7.9 G/DL (ref 6.4–8.2)
RBC # BLD AUTO: 4.37 MILLION/UL (ref 3.81–5.12)
SODIUM SERPL-SCNC: 139 MMOL/L (ref 136–145)
TSH SERPL DL<=0.05 MIU/L-ACNC: 3.34 UIU/ML (ref 0.36–3.74)
URATE SERPL-MCNC: 4.7 MG/DL (ref 2–6.8)
WBC # BLD AUTO: 6.31 THOUSAND/UL (ref 4.31–10.16)

## 2018-03-10 PROCEDURE — 96372 THER/PROPH/DIAG INJ SC/IM: CPT

## 2018-03-10 PROCEDURE — 36415 COLL VENOUS BLD VENIPUNCTURE: CPT

## 2018-03-10 PROCEDURE — 86038 ANTINUCLEAR ANTIBODIES: CPT

## 2018-03-10 PROCEDURE — 86430 RHEUMATOID FACTOR TEST QUAL: CPT

## 2018-03-10 PROCEDURE — 86618 LYME DISEASE ANTIBODY: CPT

## 2018-03-10 PROCEDURE — 86140 C-REACTIVE PROTEIN: CPT

## 2018-03-10 PROCEDURE — 85025 COMPLETE CBC W/AUTO DIFF WBC: CPT

## 2018-03-10 PROCEDURE — 80053 COMPREHEN METABOLIC PANEL: CPT

## 2018-03-10 PROCEDURE — 99282 EMERGENCY DEPT VISIT SF MDM: CPT

## 2018-03-10 PROCEDURE — 84443 ASSAY THYROID STIM HORMONE: CPT

## 2018-03-10 PROCEDURE — 84550 ASSAY OF BLOOD/URIC ACID: CPT

## 2018-03-10 PROCEDURE — 85652 RBC SED RATE AUTOMATED: CPT

## 2018-03-10 RX ORDER — PREDNISONE 20 MG/1
60 TABLET ORAL ONCE
Status: COMPLETED | OUTPATIENT
Start: 2018-03-10 | End: 2018-03-10

## 2018-03-10 RX ORDER — FAMOTIDINE 20 MG/1
20 TABLET, FILM COATED ORAL 2 TIMES DAILY
Qty: 10 TABLET | Refills: 0 | Status: SHIPPED | OUTPATIENT
Start: 2018-03-10 | End: 2018-08-29

## 2018-03-10 RX ORDER — HYDROXYZINE HYDROCHLORIDE 25 MG/1
25 TABLET, FILM COATED ORAL EVERY 6 HOURS PRN
Qty: 25 TABLET | Refills: 0 | Status: SHIPPED | OUTPATIENT
Start: 2018-03-10 | End: 2018-08-29

## 2018-03-10 RX ORDER — FAMOTIDINE 20 MG/1
20 TABLET, FILM COATED ORAL ONCE
Status: COMPLETED | OUTPATIENT
Start: 2018-03-10 | End: 2018-03-10

## 2018-03-10 RX ORDER — DIPHENHYDRAMINE HYDROCHLORIDE 50 MG/ML
50 INJECTION INTRAMUSCULAR; INTRAVENOUS ONCE
Status: COMPLETED | OUTPATIENT
Start: 2018-03-10 | End: 2018-03-10

## 2018-03-10 RX ORDER — PREDNISONE 20 MG/1
20 TABLET ORAL DAILY
Qty: 12 TABLET | Refills: 0 | Status: SHIPPED | OUTPATIENT
Start: 2018-03-10 | End: 2018-03-14

## 2018-03-10 RX ADMIN — PREDNISONE 60 MG: 20 TABLET ORAL at 02:40

## 2018-03-10 RX ADMIN — FAMOTIDINE 20 MG: 20 TABLET, FILM COATED ORAL at 02:41

## 2018-03-10 RX ADMIN — DIPHENHYDRAMINE HYDROCHLORIDE 50 MG: 50 INJECTION, SOLUTION INTRAMUSCULAR; INTRAVENOUS at 02:41

## 2018-03-10 NOTE — DISCHARGE INSTRUCTIONS
Urticaria   WHAT YOU NEED TO KNOW:   Urticaria is also called hives  Hives can change size and shape, and appear anywhere on your skin  They can be mild or severe and last from a few minutes to a few days  Hives may be a sign of a severe allergic reaction called anaphylaxis that needs immediate treatment  Urticaria that lasts longer than 6 weeks may be a chronic condition that needs long-term treatment  DISCHARGE INSTRUCTIONS:   Call 911 for signs or symptoms of anaphylaxis,  such as trouble breathing, swelling in your mouth or throat, or wheezing  You may also have itching, a rash, or feel like you are going to faint  Return to the emergency department if:   · Your heart is beating faster than it normally does  · You have cramping or severe pain in your abdomen  Contact your healthcare provider if:   · You have a fever  · Your skin still itches 24 hours after you take your medicine  · You still have hives after 7 days  · Your joints are painful and swollen  · You have questions or concerns about your condition or care  Medicines:   · Epinephrine  is used to treat severe allergic reactions such as anaphylaxis  · Antihistamines  decrease mild symptoms such as itching or a rash  · Steroids  decrease redness, pain, and swelling  · Take your medicine as directed  Contact your healthcare provider if you think your medicine is not helping or if you have side effects  Tell him of her if you are allergic to any medicine  Keep a list of the medicines, vitamins, and herbs you take  Include the amounts, and when and why you take them  Bring the list or the pill bottles to follow-up visits  Carry your medicine list with you in case of an emergency  Steps to take for signs or symptoms of anaphylaxis:   · Immediately  give 1 shot of epinephrine only into the outer thigh muscle  · Leave the shot in place  as directed   Your healthcare provider may recommend you leave it in place for up to 10 seconds before you remove it  This helps make sure all of the epinephrine is delivered  · Call 911 and go to the emergency department,  even if the shot improved symptoms  Do not drive yourself  Bring the used epinephrine shot with you  Safety precautions to take if you are at risk for anaphylaxis:   · Keep 2 shots of epinephrine with you at all times  You may need a second shot, because epinephrine only works for about 20 minutes and symptoms may return  Your healthcare provider can show you and family members how to give the shot  Check the expiration date every month and replace it before it expires  · Create an action plan  Your healthcare provider can help you create a written plan that explains the allergy and an emergency plan to treat a reaction  The plan explains when to give a second epinephrine shot if symptoms return or do not improve after the first  Give copies of the action plan and emergency instructions to family members, work and school staff, and  providers  Show them how to give a shot of epinephrine  · Be careful when you exercise  If you have had exercise-induced anaphylaxis, do not exercise right after you eat  Stop exercising right away if you start to develop any signs or symptoms of anaphylaxis  You may first feel tired, warm, or have itchy skin  Hives, swelling, and severe breathing problems may develop if you continue to exercise  · Carry medical alert identification  Wear medical alert jewelry or carry a card that explains the allergy  Ask your healthcare provider where to get these items  · Keep a record of triggers and symptoms  Record everything you eat, drink, or apply to your skin for 3 weeks  Include stressful events and what you were doing right before your hives started  Bring the record with you to follow-up visits with your healthcare provider  Manage urticaria:   · Cool your skin  This may help decrease itching  Apply a cool pack to your hives  Dip a hand towel in cool water, wring it out, and place it on your hives  You may also soak your skin in a cool oatmeal bath  · Do not rub your hives  This can irritate your skin and cause more hives  · Wear loose clothing  Tight clothes may irritate your skin and cause more hives  · Manage stress  Stress may trigger hives, or make them worse  Learn new ways to relax, such as deep breathing  Follow up with your healthcare provider as directed:  Write down your questions so you remember to ask them during your visits  © 2017 [employer] Information is for End User's use only and may not be sold, redistributed or otherwise used for commercial purposes  All illustrations and images included in CareNotes® are the copyrighted property of [employer] D [employer] M [employer]  or [employer]  The above information is an  only  It is not intended as medical advice for individual conditions or treatments  Talk to your doctor, nurse or pharmacist before following any medical regimen to see if it is safe and effective for you

## 2018-03-10 NOTE — ED PROVIDER NOTES
History  Chief Complaint   Patient presents with    Itching     Patient is a 25year old female with about 1 week of worsening hives with itching  Tried steroid cream without relief  No fever  No N/V  No sob  (+) arthalgias  No new foods, soaps, detergents or medications  [facility] website checked on this patient and last Rx filled was on 12/19/17 for vicodin for 3 day supply  No recent old records from this ED seen on computer system  States she did not drive here  History provided by:  Patient and parent   used: No        None       History reviewed  No pertinent past medical history  No past surgical history on file  No family history on file  I have reviewed and agree with the history as documented  Social History   Substance Use Topics    Smoking status: Not on file    Smokeless tobacco: Not on file    Alcohol use Not on file        Review of Systems   Constitutional: Negative for fever  Respiratory: Negative for shortness of breath  Gastrointestinal: Negative for nausea and vomiting  Musculoskeletal: Positive for arthralgias  Skin: Positive for rash  itching       Physical Exam  ED Triage Vitals [03/10/18 0116]   Temperature Pulse Respirations Blood Pressure SpO2   98 1 °F (36 7 °C) 77 18 160/68 99 %      Temp Source Heart Rate Source Patient Position - Orthostatic VS BP Location FiO2 (%)   Oral Monitor Sitting Left arm --      Pain Score       No Pain           Orthostatic Vital Signs  Vitals:    03/10/18 0116   BP: 160/68   Pulse: 77   Patient Position - Orthostatic VS: Sitting       Physical Exam   Constitutional: She appears well-developed and well-nourished  She appears distressed (mild)  HENT:   Head: Normocephalic and atraumatic  Mouth/Throat: Oropharynx is clear and moist    Eyes: No scleral icterus  Neck: No tracheal deviation present  Cardiovascular: Normal rate, regular rhythm and normal heart sounds      No murmur heard   Pulmonary/Chest: Effort normal and breath sounds normal  No stridor  No respiratory distress  Abdominal: Soft  Bowel sounds are normal  There is no tenderness  Musculoskeletal: She exhibits no edema or deformity  Neurological: She is alert  Skin: Skin is warm and dry  Rash (diffuse urticaria) noted  Psychiatric: She has a normal mood and affect  Nursing note and vitals reviewed  ED Medications  Medications   diphenhydrAMINE (BENADRYL) injection 50 mg (not administered)   predniSONE tablet 60 mg (not administered)   famotidine (PEPCID) tablet 20 mg (not administered)       Diagnostic Studies  Results Reviewed     None                 No orders to display              Procedures  Procedures       Phone Contacts  ED Phone Contact    ED Course  ED Course                                MDM  Number of Diagnoses or Management Options  Diagnosis management comments: DDx including but not limited to: Allergic reaction, urticaria, anxiety; doubt angioedema, cellulitis, anaphylaxis  Amount and/or Complexity of Data Reviewed  Decide to obtain previous medical records or to obtain history from someone other than the patient: yes  Obtain history from someone other than the patient: yes      CritCare Time    Disposition  Final diagnoses:   Urticaria     Time reflects when diagnosis was documented in both MDM as applicable and the Disposition within this note     Time User Action Codes Description Comment    3/10/2018  2:39 AM Forrest España Add [L50 9] Urticaria       ED Disposition     ED Disposition Condition Comment    Discharge  [address] discharge to home/self care  Condition at discharge: Stable        Follow-up Information     Follow up With Specialties Details Why Contact Wellington Shipman Ing, DO Family Medicine Call in 3 days Return sooner if increased rash, difficulty breathing or swallowing, fever, vomiting, lethargy  [address]   [facility] [address] [facility] Patient's Medications   Discharge Prescriptions    FAMOTIDINE (PEPCID) 20 MG TABLET    Take 1 tablet (20 mg total) by mouth 2 (two) times a day for 5 days       Start Date: 3/10/2018 End Date: 3/15/2018       Order Dose: 20 mg       Quantity: 10 tablet    Refills: 0    HYDROXYZINE HCL (ATARAX) 25 MG TABLET    Take 1 tablet (25 mg total) by mouth every 6 (six) hours as needed for itching for up to 7 days       Start Date: 3/10/2018 End Date: 3/17/2018       Order Dose: 25 mg       Quantity: 25 tablet    Refills: 0    PREDNISONE 20 MG TABLET    Take 1 tablet (20 mg total) by mouth daily for 4 days Take 3 tabs daily for 4 days       Start Date: 3/10/2018 End Date: 3/14/2018       Order Dose: 20 mg       Quantity: 12 tablet    Refills: 0     No discharge procedures on file      ED Provider  Electronically Signed by           Jose Mariscal MD  03/10/18 7845

## 2018-03-12 LAB
B BURGDOR IGG SER IA-ACNC: 0.31
B BURGDOR IGM SER IA-ACNC: 0.21
RHEUMATOID FACT SER QL LA: NEGATIVE
RYE IGE QN: NEGATIVE

## 2018-03-14 ENCOUNTER — OFFICE VISIT (OUTPATIENT)
Dept: PHYSICAL THERAPY | Facility: MEDICAL CENTER | Age: 23
End: 2018-03-14
Payer: COMMERCIAL

## 2018-03-14 DIAGNOSIS — M75.91 LESION OF RIGHT SHOULDER: Primary | ICD-10-CM

## 2018-03-14 PROCEDURE — G8991 OTHER PT/OT GOAL STATUS: HCPCS | Performed by: PHYSICAL THERAPIST

## 2018-03-14 PROCEDURE — 97140 MANUAL THERAPY 1/> REGIONS: CPT | Performed by: PHYSICAL THERAPIST

## 2018-03-14 PROCEDURE — G8992 OTHER PT/OT  D/C STATUS: HCPCS | Performed by: PHYSICAL THERAPIST

## 2018-03-14 PROCEDURE — 97110 THERAPEUTIC EXERCISES: CPT | Performed by: PHYSICAL THERAPIST

## 2018-03-14 NOTE — PROGRESS NOTES
Daily Note & Discharge     Today's date: 3/14/2018  Patient name: Tayo Saravia  : 1995  MRN: [id number]  Referring provider: Louise Blanco MD  Dx:   Encounter Diagnosis     ICD-10-CM    1  Lesion of right shoulder M75 91                   Subjective: Pt notes that she is doing well overall, and that she is feeling better  She notes that her pain in her anterior shoulder has significantly subsided  Objective: See treatment diary below      Assessment: Tolerated treatment well  Patient demonstrated fatigue post treatment, exhibited good technique with therapeutic exercises and would benefit from continued PT      Plan: Progress treatment as tolerated  pt will follow up with her surgeon tomorrow  Additional care will be determined at that time       Precautions: asthma, ADD     Daily Treatment Diary      Manual  1/28 1/29 2/1 2/8 2/14 2/21  2/28  3/8  3/14     shld PROM 10' 10' 10'  10' 10' 10'  10'  10'  10'     Deep tissue deformation to anterior pec           5'  5'  5'                                                                                     Exercise Diary  1/28 1/29 2/1 2/8 2/14 2/21  2/28  3/8  3/14     pulley's 5' 5' 5' 5 min 5' 5'  5'  5'  5'     Tb rows/ext GTB 20x ea GTB 20x ea GTB 20x GTB x20 ea GTB x20 ea GTB 20xea  d/c  d/c  d/c     S/l shld ER 1# 30x 1# 30x 1# 30x 1# x30 1# 30x 2# 30x  2# 30x  2# 30x  2# 30x     Supine butterfly              5" x10 5" x10  HEP     Prone I, T, Y's 5" x15 ea 5" x15 ea 5" x15 1# ea 5 sec x15 ea 5" x15 ea 1# 5" x15 1# ea  5" x15 1# 5" x15 1# 5" x15 2#     Low trap lifts RTB 15x RTB 15x RTB 15x RTB x15 RTB 15x RTB 15x  RTB 15x RTB15x RTB 15x     Ball on wall circles Cw/CCw 20x CW/CCW 20x CW/CCW 20x CW/CCW 20x CW/CCW 20x CW/CCW 20x  CW/CCW 20x  20x ea  20x     tband ER RTB 20x    RTB 20x RTB 20x RTB x20 RTB 20x GTB 20x  d/c  d/c  d/c     Supine alphabet 1# 2x  1# 2x 1# 2x  1# 2x 1# 2x 1# 2x  2# 2x  2# 2x  2# 2x     Bicep curls   5# 10x 5# 15x 5# x15 7# x15 7# x15  7# 20x 7# 20x  7# 20x     KB press     3# 10x 3# 10x 3# 10x 3# 10x  3# 10x 3# 10x 3# 10x     Wall slides         10x 10x  10x 10x  10x     Corner pec stretch           15" x4  HEP  HEP  HEP     UBE (retro)           NV  3'  3' 3'                                                                                                                                                           Modalities                        CP PRN                                                                           3/28/18- Alma Rosa Morton followed up with her surgeon who was pleased with her progress and released her from his care  She denies any pain or loss of function  At this time, she will be discharged from skilled care, as she has achieved all of her goals and returned to her functional activities without restriction

## 2018-05-02 ENCOUNTER — OFFICE VISIT (OUTPATIENT)
Dept: URGENT CARE | Facility: MEDICAL CENTER | Age: 23
End: 2018-05-02
Payer: COMMERCIAL

## 2018-05-02 VITALS
WEIGHT: 144 LBS | OXYGEN SATURATION: 100 % | HEART RATE: 75 BPM | SYSTOLIC BLOOD PRESSURE: 122 MMHG | RESPIRATION RATE: 16 BRPM | BODY MASS INDEX: 21.9 KG/M2 | DIASTOLIC BLOOD PRESSURE: 79 MMHG | TEMPERATURE: 99.3 F

## 2018-05-02 DIAGNOSIS — L50.9 HIVES: Primary | ICD-10-CM

## 2018-05-02 PROCEDURE — 99203 OFFICE O/P NEW LOW 30 MIN: CPT

## 2018-05-02 PROCEDURE — S9088 SERVICES PROVIDED IN URGENT: HCPCS

## 2018-05-02 RX ORDER — HYDROXYZINE 50 MG/1
50 TABLET, FILM COATED ORAL DAILY
COMMUNITY
End: 2018-08-29

## 2018-05-02 RX ORDER — METHYLPREDNISOLONE 4 MG/1
TABLET ORAL
Qty: 21 TABLET | Refills: 0 | Status: SHIPPED | OUTPATIENT
Start: 2018-05-02 | End: 2018-08-29

## 2018-05-02 RX ORDER — FEXOFENADINE HCL 180 MG/1
180 TABLET ORAL DAILY
COMMUNITY
End: 2018-08-29

## 2018-05-02 NOTE — PATIENT INSTRUCTIONS
Take steroid as directed  Follow up with allergist  Follow up with PCP in 1-2 days  Go to the ER for worsening symptoms  Cold Compress or Soak   WHAT YOU NEED TO KNOW:   A cold compress or soak helps relieve pain, swelling, and itching  You may need a cold compress or soak to help manage any of the following:  · A sunburn    · Poison ivy or poison oak    · A skin rash    · A bite or sting by an insect or jellyfish    · A muscle or joint injury, such as a sprain    · A high fever  DISCHARGE INSTRUCTIONS:   Contact your healthcare provider if:   · Your symptoms do not improve or you have new symptoms  · You have questions or concerns about your condition or care  How to prepare and use a moist cold compress: Your healthcare provider will tell you how often to apply a cold compress:  · Wash your hands  · Use a washcloth, small towel, or gauze as a cold compress  · You can place the compress under running water or place it in a bowl with cold water  Squeeze extra water out of the compress  · Place the compress directly on the area  · Remove the compress in 10 to 15 minutes or as directed  Gently pat your skin dry with a clean towel  · Wash your hands  · Reapply the compress as many times as directed each day  Use a clean compress every time  How to use a dry cold compress: An ice pack, bag of ice, or bottle filled with cold water can be used as a dry compress  Cover the ice pack or bag of ice with a towel before you apply it to your skin  Leave the compress on your skin for 15 to 20 minutes or as directed  Your healthcare provider will tell you how often to apply the compress each day  How to prepare and use a cold soak:   · Fill a clean container or tub with cold water  The container should be deep enough to cover the area completely  · Remove any bandages  · Soak the area for no longer than 10 minutes  Gently pat your skin dry when you are done soaking       · Replace bandages as directed  · Clean the container or tub when finished  · Wash your hands  Follow up with your healthcare provider as directed:  Write down your questions so you remember to ask them during your visits  © 2017 [address] [employer] Information is for End User's use only and may not be sold, redistributed or otherwise used for commercial purposes  All illustrations and images included in CareNotes® are the copyrighted property of [employer]  or [employer]  The above information is an  only  It is not intended as medical advice for individual conditions or treatments  Talk to your doctor, nurse or pharmacist before following any medical regimen to see if it is safe and effective for you

## 2018-05-02 NOTE — PROGRESS NOTES
States she has had hives for 3 months  Saw PCP  Ordered labd which were ok  Saw allergist who prescribed various meds, including Prednisone  Currently on Allegra in am and Hydroxyzine 50mg at night  Hives persist  Swelling in R ankle and today both hands

## 2018-05-04 ENCOUNTER — OFFICE VISIT (OUTPATIENT)
Dept: INTERNAL MEDICINE CLINIC | Age: 23
End: 2018-05-04
Payer: COMMERCIAL

## 2018-05-04 ENCOUNTER — LAB (OUTPATIENT)
Dept: LAB | Age: 23
End: 2018-05-04
Payer: COMMERCIAL

## 2018-05-04 ENCOUNTER — TRANSCRIBE ORDERS (OUTPATIENT)
Dept: LAB | Age: 23
End: 2018-05-04

## 2018-05-04 VITALS
BODY MASS INDEX: 22.56 KG/M2 | TEMPERATURE: 98.3 F | HEART RATE: 100 BPM | HEIGHT: 66 IN | WEIGHT: 140.4 LBS | SYSTOLIC BLOOD PRESSURE: 115 MMHG | OXYGEN SATURATION: 96 % | DIASTOLIC BLOOD PRESSURE: 64 MMHG

## 2018-05-04 DIAGNOSIS — L50.9 URTICARIA OF UNKNOWN ORIGIN: ICD-10-CM

## 2018-05-04 DIAGNOSIS — L50.9 URTICARIA OF UNKNOWN ORIGIN: Primary | ICD-10-CM

## 2018-05-04 DIAGNOSIS — L50.9 URTICARIA, UNSPECIFIED: Primary | ICD-10-CM

## 2018-05-04 PROBLEM — L73.9 FOLLICULITIS: Status: ACTIVE | Noted: 2017-11-14

## 2018-05-04 PROCEDURE — 82785 ASSAY OF IGE: CPT

## 2018-05-04 PROCEDURE — 3008F BODY MASS INDEX DOCD: CPT | Performed by: INTERNAL MEDICINE

## 2018-05-04 PROCEDURE — 86003 ALLG SPEC IGE CRUDE XTRC EA: CPT

## 2018-05-04 PROCEDURE — 36415 COLL VENOUS BLD VENIPUNCTURE: CPT

## 2018-05-04 PROCEDURE — 99213 OFFICE O/P EST LOW 20 MIN: CPT | Performed by: INTERNAL MEDICINE

## 2018-05-04 NOTE — PROGRESS NOTES
Assessment/Plan:    Urticaria   Recommend continuing methylprednisolone taper pack, Allegra 180 mg daily, and restarting Atarax 50 mg daily  Advise she follow up with her allergist  Will also order [facility] Allergy panel  Diagnoses and all orders for this visit:    Urticaria of unknown origin  -     [facility] Allergy Panel, Adult; Future          Subjective:      Patient ID: Dipika Maza is a 25 y o  female  70-year-old female is seen today with acute symptoms of diffuse hives  She reports that since February 2018,  She has been seen by her family doctor as well as an allergist for evaluation of recurrent hives  CBC, CMP, CHRISTOPHER, Lyme antibody, and sedimentation rate were all negative, she did have an elevation in CRP of 9 3  Since seeing her family doctor an allergist, she was also evaluated at an urgent care center on 05/02/2018 for acute flare of hives to which she was prescribed a methylprednisolone pack  Her allergist put her on a medication regimen which consist of Allegra 180 mg in the morning and hydroxyzine 50 mg at bedtime  She cannot comment or recall any new detergents, pets, new living environment  She had tried changing new detergents both for close and body, to which she has not noticed an improvement/resolution of hives  She is currently on day 2 of steroid pack and noticed hives this morning  She stopped atarax since starting steroid taper  Urticaria   This is a recurrent problem  The current episode started more than 1 month ago  The problem is unchanged  The rash is diffuse  The rash is characterized by burning  She was exposed to nothing  Pertinent negatives include no congestion, cough, diarrhea, fatigue, fever, rhinorrhea, shortness of breath, sore throat or vomiting  Past treatments include antihistamine  The treatment provided no relief         The following portions of the patient's history were reviewed and updated as appropriate: allergies, current medications, past family history, past medical history, past social history, past surgical history and problem list     Review of Systems   Constitutional: Negative for activity change, appetite change, chills, diaphoresis, fatigue and fever  HENT: Negative for congestion, postnasal drip, rhinorrhea, sinus pain, sinus pressure, sneezing and sore throat  Eyes: Negative for visual disturbance  Respiratory: Negative for apnea, cough, choking, chest tightness, shortness of breath and wheezing  Cardiovascular: Negative for chest pain, palpitations and leg swelling  Gastrointestinal: Negative for abdominal distention, abdominal pain, anal bleeding, blood in stool, constipation, diarrhea, nausea and vomiting  Endocrine: Negative for cold intolerance and heat intolerance  Genitourinary: Negative for difficulty urinating, dysuria and hematuria  Musculoskeletal: Negative  Skin: Positive for rash  Neurological: Negative for dizziness, weakness, light-headedness, numbness and headaches  Hematological: Negative for adenopathy  Psychiatric/Behavioral: Negative for agitation, sleep disturbance and suicidal ideas  All other systems reviewed and are negative          Past Medical History:   Diagnosis Date    Asthma          Current Outpatient Prescriptions:     fexofenadine (ALLEGRA) 180 MG tablet, Take 180 mg by mouth daily, Disp: , Rfl:     Methylprednisolone 4 MG TBPK, Use as directed on package, Disp: 21 tablet, Rfl: 0    famotidine (PEPCID) 20 mg tablet, Take 1 tablet (20 mg total) by mouth 2 (two) times a day for 5 days, Disp: 10 tablet, Rfl: 0    hydrOXYzine HCL (ATARAX) 25 mg tablet, Take 1 tablet (25 mg total) by mouth every 6 (six) hours as needed for itching for up to 7 days, Disp: 25 tablet, Rfl: 0    hydrOXYzine HCL (ATARAX) 50 mg tablet, Take 50 mg by mouth daily, Disp: , Rfl:     Allergies   Allergen Reactions    Amoxicillin     Other        Social History   Past Surgical History:   Procedure Laterality Date    GANGLION CYST EXCISION      KNEE ARTHROSCOPY W/ MENISCAL REPAIR      SHOULDER SURGERY      TONSILLECTOMY       Family History   Problem Relation Age of Onset    Hypothyroidism Mother     No Known Problems Father     Breast cancer Paternal Grandmother        Objective:  /64 (BP Location: Left arm, Patient Position: Sitting, Cuff Size: Standard)   Pulse 100   Temp 98 3 °F (36 8 °C) (Tympanic)   Ht 5' 6 46" (1 688 m)   Wt 63 7 kg (140 lb 6 4 oz)   SpO2 96%   BMI 22 35 kg/m²     Recent Results (from the past 1344 hour(s))   [facility] Allergy Panel, Adult    Collection Time: 05/04/18 12:59 PM   Result Value Ref Range    A  ALTERNATA <0 10 0 00 - 0 09 kUA/I    A  FUMIGATUS <0 10 0 00 - 0 09 kUA/I    Bermuda Grass <0 10 0 00 - 0 09 kUA/I    Lares  <0 10 0 00 - 0 09 kUA/I    Cat Epithellium-Dander <0 10 0 00 - 0 09 kUA/I    C HERBARUM <0 10 0 00 - 0 09 kUA/I    Cockroach <0 10 0 00 - 0 09 kUA/I    Common Silver Birch <0 10 0 00 - 0 09 kUA/I    Bryan <0 10 0 00 - 0 09 kUA/I    D  farinae <0 10 0 00 - 0 09 kUA/I    D  pteronyssinus <0 10 0 00 - 0 09 kUA/I    Dog Dander <0 10 0 00 - 0 09 kUA/I    Elm IgE <0 10 0 00 - 0 09 kUA/I    Mountain [place] Tree <0 10 0 00 - 0 09 kUA/I    Mugwort <0 10 0 00 - 0 09 kUA/I    Limestone Tree <0 10 0 00 - 0 09 kUA/I    Oak <0 10 0 00 - 0 09 kUA/I    P CHRYSOGENUM <0 10 0 00 - 0 09 kUA/I    Rough Pigweed  IgE <0 10 0 00 - 0 09 kUA/I    Common Ragweed <0 10 0 00 - 0 09 kUA/I    Sheep Sorrel IgE <0 10 0 00 - 0 09 kUA/I    [place] Tree <0 10 0 00 - 0 09 kUA/I    Gerard Grass <0 10 0 00 - 0 09 kUA/I    [place] Tree <0 10 0 00 - 0 09 kUA/I    White [place] Tree <0 10 0 00 - 0 09 kUA/I    IgE 4 13 0 - 113 kU/l    Allergen Comment See Below     MOUSE URINE <0 10 0 00 - 0 09 kUA/I            Physical Exam   Constitutional: She is oriented to person, place, and time  She appears well-developed and well-nourished  No distress     HENT:   Head: Normocephalic and atraumatic  Eyes: Conjunctivae and EOM are normal  Pupils are equal, round, and reactive to light  Right eye exhibits no discharge  Left eye exhibits no discharge  Neck: Normal range of motion  Neck supple  No JVD present  No thyromegaly present  Cardiovascular: Normal rate, regular rhythm, normal heart sounds and intact distal pulses  Exam reveals no gallop and no friction rub  No murmur heard  Pulmonary/Chest: Effort normal and breath sounds normal  No respiratory distress  She has no wheezes  She has no rales  She exhibits no tenderness  Abdominal: Soft  She exhibits no distension  There is no tenderness  Musculoskeletal: Normal range of motion  She exhibits no edema, tenderness or deformity  Lymphadenopathy:     She has no cervical adenopathy  Neurological: She is alert and oriented to person, place, and time  No cranial nerve deficit  Coordination normal    Skin: Skin is warm and dry  Rash noted  Rash is urticarial  She is not diaphoretic  No erythema  No pallor  Psychiatric: She has a normal mood and affect  Her behavior is normal  Judgment and thought content normal    Nursing note and vitals reviewed

## 2018-05-04 NOTE — ASSESSMENT & PLAN NOTE
Recommend continuing methylprednisolone taper pack, Allegra 180 mg daily, and restarting Atarax 50 mg daily  Advise she follow up with her allergist  Will also order [place] Allergy panel

## 2018-05-07 LAB
A ALTERNATA IGE QN: <0.1 KUA/I
A FUMIGATUS IGE QN: <0.1 KUA/I
ALLERGEN COMMENT: NORMAL
BERMUDA GRASS IGE QN: <0.1 KUA/I
BOXELDER IGE QN: <0.1 KUA/I
C HERBARUM IGE QN: <0.1 KUA/I
CAT DANDER IGE QN: <0.1 KUA/I
CMN PIGWEED IGE QN: <0.1 KUA/I
COMMON RAGWEED IGE QN: <0.1 KUA/I
COTTONWOOD IGE QN: <0.1 KUA/I
D FARINAE IGE QN: <0.1 KUA/I
D PTERONYSS IGE QN: <0.1 KUA/I
DOG DANDER IGE QN: <0.1 KUA/I
LONDON PLANE IGE QN: <0.1 KUA/I
MOUSE URINE PROT IGE QN: <0.1 KUA/I
MT JUNIPER IGE QN: <0.1 KUA/I
MUGWORT IGE QN: <0.1 KUA/I
P NOTATUM IGE QN: <0.1 KUA/I
ROACH IGE QN: <0.1 KUA/I
SHEEP SORREL IGE QN: <0.1 KUA/I
SILVER BIRCH IGE QN: <0.1 KUA/I
TIMOTHY IGE QN: <0.1 KUA/I
TOTAL IGE SMQN RAST: 4.13 KU/L (ref 0–113)
WALNUT IGE QN: <0.1 KUA/I
WHITE ASH IGE QN: <0.1 KUA/I
WHITE ELM IGE QN: <0.1 KUA/I
WHITE MULBERRY IGE QN: <0.1 KUA/I
WHITE OAK IGE QN: <0.1 KUA/I

## 2018-05-07 NOTE — PROGRESS NOTES
[facility]        NAME: Agusto Taylor is a 25 y o  female  : 1995    MRN: [id number]      Assessment and Plan   Hives [L50 9]  1  Hives  Methylprednisolone 4 MG TBPK         Patient Instructions       Take steroid as directed  Follow up with allergist  Follow up with PCP in 1-2 days  Go to the ER for worsening symptoms  Chief Complaint     Chief Complaint   Patient presents with    Urticaria     3 months         History of Present Illness       Urticaria   This is a new problem  The current episode started more than 1 month ago  The problem is unchanged  The rash is diffuse  The rash is characterized by itchiness and swelling  She was exposed to nothing  Pertinent negatives include no congestion, eye pain, facial edema, fatigue, fever, shortness of breath, sore throat or vomiting  Treatments tried: PT taking hydroxizine and allegra with no relief of sympotms  The treatment provided no relief  There is no history of allergies, asthma, eczema or varicella  Review of Systems   Review of Systems   Constitutional: Negative for chills, fatigue and fever  HENT: Negative for congestion, ear pain, hearing loss, postnasal drip, sinus pain, sinus pressure and sore throat  Eyes: Negative for pain and discharge  Respiratory: Negative for chest tightness and shortness of breath  Cardiovascular: Negative for chest pain  Gastrointestinal: Negative for abdominal pain, constipation, nausea and vomiting  Genitourinary: Negative for difficulty urinating  Musculoskeletal: Negative for arthralgias and myalgias  Skin: Positive for rash  Neurological: Negative for dizziness and headaches  Psychiatric/Behavioral: Negative for behavioral problems           Current Medications       Current Outpatient Prescriptions:     fexofenadine (ALLEGRA) 180 MG tablet, Take 180 mg by mouth daily, Disp: , Rfl:     hydrOXYzine HCL (ATARAX) 50 mg tablet, Take 50 mg by mouth daily, Disp: , Rfl:    famotidine (PEPCID) 20 mg tablet, Take 1 tablet (20 mg total) by mouth 2 (two) times a day for 5 days, Disp: 10 tablet, Rfl: 0    hydrOXYzine HCL (ATARAX) 25 mg tablet, Take 1 tablet (25 mg total) by mouth every 6 (six) hours as needed for itching for up to 7 days, Disp: 25 tablet, Rfl: 0    Methylprednisolone 4 MG TBPK, Use as directed on package, Disp: 21 tablet, Rfl: 0    Current Allergies     Allergies as of 05/02/2018    (No Known Allergies)            The following portions of the patient's history were reviewed and updated as appropriate: allergies, current medications, past family history, past medical history, past social history, past surgical history and problem list      Past Medical History:   Diagnosis Date    Asthma        Past Surgical History:   Procedure Laterality Date    GANGLION CYST EXCISION      KNEE ARTHROSCOPY W/ MENISCAL REPAIR      SHOULDER SURGERY      TONSILLECTOMY         Family History   Problem Relation Age of Onset    Hypothyroidism Mother     No Known Problems Father     Breast cancer Paternal Grandmother          Medications have been verified  Objective   /79 (Patient Position: Sitting)   Pulse 75   Temp 99 3 °F (37 4 °C) (Oral)   Resp 16   Wt 65 3 kg (144 lb)   SpO2 100%   BMI 21 90 kg/m²        Physical Exam     Physical Exam   Constitutional: She is oriented to person, place, and time  She appears well-developed and well-nourished  HENT:   Right Ear: Tympanic membrane and external ear normal    Left Ear: Tympanic membrane and external ear normal    Neck: Normal range of motion  No edema present  Cardiovascular: Normal rate, regular rhythm, S1 normal, S2 normal and normal heart sounds  No murmur heard  Pulmonary/Chest: Effort normal and breath sounds normal  No respiratory distress  She has no wheezes  She has no rales  She exhibits no tenderness  Lymphadenopathy:     She has no cervical adenopathy     Neurological: She is alert and oriented to person, place, and time  Skin: Skin is warm, dry and intact  Rash noted  Rash is urticarial         Psychiatric: She has a normal mood and affect  Her speech is normal and behavior is normal    Nursing note and vitals reviewed

## 2018-05-09 ENCOUNTER — TRANSCRIBE ORDERS (OUTPATIENT)
Dept: PHYSICAL THERAPY | Facility: MEDICAL CENTER | Age: 23
End: 2018-05-09

## 2018-05-09 ENCOUNTER — TELEPHONE (OUTPATIENT)
Dept: INTERNAL MEDICINE CLINIC | Age: 23
End: 2018-05-09

## 2018-05-09 DIAGNOSIS — M75.100 ROTATOR CUFF SYNDROME OF SHOULDER AND ALLIED DISORDERS, UNSPECIFIED LATERALITY: Primary | ICD-10-CM

## 2018-05-09 DIAGNOSIS — M75.91 LESION OF RIGHT SHOULDER: Primary | ICD-10-CM

## 2018-05-09 DIAGNOSIS — Z98.890 PERSONAL HISTORY OF SURGERY TO HEART AND GREAT VESSELS, PRESENTING HAZARDS TO HEALTH: Primary | ICD-10-CM

## 2018-05-09 NOTE — TELEPHONE ENCOUNTER
----- Message from Armida Vidal MD sent at 5/7/2018  2:27 PM EDT -----  Can we please contact Ms Saucedo to inform her that her allergy panel was negative  Also, if we can fax the results to her allergies and PCP  Thank you

## 2018-05-09 NOTE — TELEPHONE ENCOUNTER
I spoke with Nan Marino  She stated that the itching and lesions continue to be intermittent and severe from the waist to feet  The Atarax does not seem to give any relief  She has completed the steroids and this treatment was more effective  She has an appt scheduled with a new allergist that is associated with [facility]win  (Dr Sayra Taylor)  She will contact me if she needs any records forwarded  She believes that he has access to [facility]

## 2018-06-14 DIAGNOSIS — IMO0001 BIRTH CONTROL: Primary | ICD-10-CM

## 2018-06-14 NOTE — TELEPHONE ENCOUNTER
Patient needs a one month refill on her Lo Loestrin to [facility] in [address] [place] [address]  She is going to make an appointment for next month for her yearly

## 2018-07-17 DIAGNOSIS — IMO0001 BIRTH CONTROL: ICD-10-CM

## 2018-07-17 RX ORDER — NORETHINDRONE ACETATE AND ETHINYL ESTRADIOL, ETHINYL ESTRADIOL AND FERROUS FUMARATE 1MG-10(24)
KIT ORAL
Qty: 28 TABLET | Refills: 0 | Status: SHIPPED | OUTPATIENT
Start: 2018-07-17 | End: 2018-08-16 | Stop reason: SDUPTHER

## 2018-08-03 ENCOUNTER — TRANSCRIBE ORDERS (OUTPATIENT)
Dept: ADMINISTRATIVE | Facility: HOSPITAL | Age: 23
End: 2018-08-03

## 2018-08-03 ENCOUNTER — APPOINTMENT (OUTPATIENT)
Dept: LAB | Facility: MEDICAL CENTER | Age: 23
End: 2018-08-03
Payer: COMMERCIAL

## 2018-08-03 DIAGNOSIS — Z00.8 HEALTH EXAMINATION IN POPULATION SURVEY: Primary | ICD-10-CM

## 2018-08-03 DIAGNOSIS — Z00.8 HEALTH EXAMINATION IN POPULATION SURVEY: ICD-10-CM

## 2018-08-03 LAB
CHOLEST SERPL-MCNC: 181 MG/DL (ref 50–200)
EST. AVERAGE GLUCOSE BLD GHB EST-MCNC: 100 MG/DL
HBA1C MFR BLD: 5.1 % (ref 4.2–6.3)
HDLC SERPL-MCNC: 44 MG/DL (ref 40–60)
LDLC SERPL CALC-MCNC: 113 MG/DL (ref 0–100)
NONHDLC SERPL-MCNC: 137 MG/DL
TRIGL SERPL-MCNC: 119 MG/DL

## 2018-08-03 PROCEDURE — 36415 COLL VENOUS BLD VENIPUNCTURE: CPT

## 2018-08-03 PROCEDURE — 83036 HEMOGLOBIN GLYCOSYLATED A1C: CPT

## 2018-08-03 PROCEDURE — 80061 LIPID PANEL: CPT

## 2018-08-16 DIAGNOSIS — Z30.41 ENCOUNTER FOR SURVEILLANCE OF CONTRACEPTIVE PILLS: ICD-10-CM

## 2018-08-16 NOTE — TELEPHONE ENCOUNTER
Patient has a yearly scheduled for 08/29 with Rajesh Lara refill of OCP to cover her till then  Rx in Epic - please sign  Thanks!

## 2018-08-29 ENCOUNTER — ANNUAL EXAM (OUTPATIENT)
Dept: OBGYN CLINIC | Facility: MEDICAL CENTER | Age: 23
End: 2018-08-29
Payer: COMMERCIAL

## 2018-08-29 VITALS
BODY MASS INDEX: 22.35 KG/M2 | HEIGHT: 67 IN | WEIGHT: 142.4 LBS | DIASTOLIC BLOOD PRESSURE: 68 MMHG | SYSTOLIC BLOOD PRESSURE: 102 MMHG

## 2018-08-29 DIAGNOSIS — N64.89 BREAST ASYMMETRY: ICD-10-CM

## 2018-08-29 DIAGNOSIS — Z01.419 ENCOUNTER FOR GYNECOLOGICAL EXAMINATION (GENERAL) (ROUTINE) WITHOUT ABNORMAL FINDINGS: Primary | ICD-10-CM

## 2018-08-29 DIAGNOSIS — Z87.42 HISTORY OF OVARIAN CYST: ICD-10-CM

## 2018-08-29 DIAGNOSIS — Z30.41 ORAL CONTRACEPTIVE PILL SURVEILLANCE: ICD-10-CM

## 2018-08-29 DIAGNOSIS — Z30.41 ENCOUNTER FOR SURVEILLANCE OF CONTRACEPTIVE PILLS: ICD-10-CM

## 2018-08-29 PROCEDURE — 99395 PREV VISIT EST AGE 18-39: CPT | Performed by: NURSE PRACTITIONER

## 2018-08-29 NOTE — ASSESSMENT & PLAN NOTE
Normal findings on routine annual gyn exam  Recommended monthly SBE, annual CBE  Reviewed ASCCP guidelines and pap noted to be up to date  The patient reports she has completed Gardasil series  STI testing was offered and declined at this time; the patient is aware that condoms are recommended for all sexual contact for prevention of STI  The patient likes current contraceptive measure and desires to continue  Reviewed diet/activity recommendations and reasons to call  F/u in one year for routine annual gyn exam or sooner PRN

## 2018-08-29 NOTE — ASSESSMENT & PLAN NOTE
Pt reports h/o left ovarian cyst several years ago  Normal exam  Pelvic US ordered for follow up today

## 2018-08-29 NOTE — ASSESSMENT & PLAN NOTE
R<L 2ndary to chest wall deformity   Offered consult with plastics - she declines at this time but is aware she may follow up any time

## 2018-08-29 NOTE — PROGRESS NOTES
Assessment/Plan:    Encounter for gynecological examination (general) (routine) without abnormal findings  Normal findings on routine annual gyn exam  Recommended monthly SBE, annual CBE  Reviewed ASCCP guidelines and pap noted to be up to date  The patient reports she has completed Gardasil series  STI testing was offered and declined at this time; the patient is aware that condoms are recommended for all sexual contact for prevention of STI  The patient likes current contraceptive measure and desires to continue  Reviewed diet/activity recommendations and reasons to call  F/u in one year for routine annual gyn exam or sooner PRN  Breast asymmetry  R<L 2ndary to chest wall deformity   Offered consult with plastics - she declines at this time but is aware she may follow up any time     Oral contraceptive pill surveillance  The patient likes current OCP  She denies ACHES and desires to continue  She is aware condoms are advised with all sexual contact for prevention of STI  Refill provided  Reviewed reasons to call  History of ovarian cyst  Pt reports h/o left ovarian cyst several years ago  Normal exam  Pelvic US ordered for follow up today  Diagnoses and all orders for this visit:    Encounter for gynecological examination (general) (routine) without abnormal findings    Encounter for surveillance of contraceptive pills  -     Norethin-Eth Estrad-Fe Biphas (LO LOESTRIN FE) 1 MG-10 MCG / 10 MCG TABS; Take 1 tablet by mouth daily    History of ovarian cyst  -     US pelvis complete w transvaginal; Future    Oral contraceptive pill surveillance    Breast asymmetry    Other orders  -     albuterol (PROAIR HFA) 90 mcg/act inhaler; ProAir HFA 90 mcg/actuation aerosol inhaler          Subjective:      Patient ID: Ilsa Bundy is a 25 y o  female  This patient presents for routine annual gyn exam    She denies acute gyn complaints  Likes current OCP and desires to continue; denies ACHES   She denies pelvic pain, abn discharge, breast concerns, bowel/bladder dysfunction, depression/anx  Long term, monog  Denies STI concerns  The following portions of the patient's history were reviewed and updated as appropriate: allergies, current medications, past family history, past medical history, past social history, past surgical history and problem list     Review of Systems   Constitutional: Negative  HENT: Negative  Eyes: Negative  Respiratory: Negative  Cardiovascular: Negative  Gastrointestinal: Negative  Endocrine: Negative  Genitourinary: Negative  Musculoskeletal: Negative  Skin: Negative  Allergic/Immunologic: Negative  Neurological: Negative  Hematological: Negative  Psychiatric/Behavioral: Negative  Objective:      /68 (BP Location: Left arm, Cuff Size: Standard)   Ht 5' 6 5" (1 689 m)   Wt 64 6 kg (142 lb 6 4 oz)   LMP 08/13/2018   BMI 22 64 kg/m²          Physical Exam   Constitutional: She is oriented to person, place, and time  She appears well-developed and well-nourished  HENT:   Head: Normocephalic and atraumatic  Eyes: EOM are normal  Pupils are equal, round, and reactive to light  Neck: Normal range of motion  Neck supple  No thyromegaly present  Cardiovascular: Normal rate, regular rhythm and normal heart sounds  Pulmonary/Chest: Effort normal and breath sounds normal  No respiratory distress  She has no wheezes  She has no rales  She exhibits no mass, no tenderness and no deformity  Right breast exhibits no inverted nipple, no mass, no nipple discharge, no skin change and no tenderness  Left breast exhibits no inverted nipple, no mass, no nipple discharge, no skin change and no tenderness  Breasts are asymmetrical        Abdominal: Soft  She exhibits no distension and no mass  There is no splenomegaly or hepatomegaly  There is no tenderness  There is no rebound and no guarding     Genitourinary: Rectum normal, vagina normal and uterus normal  No breast swelling, tenderness or discharge  No labial fusion  There is no rash, tenderness, lesion or injury on the right labia  There is no rash, tenderness, lesion or injury on the left labia  Cervix exhibits no motion tenderness, no discharge and no friability  Right adnexum displays no mass, no tenderness and no fullness  Left adnexum displays no mass, no tenderness and no fullness  No erythema, tenderness or bleeding in the vagina  No foreign body in the vagina  No vaginal discharge found  Musculoskeletal: Normal range of motion  Lymphadenopathy:     She has no cervical adenopathy  She has no axillary adenopathy  Neurological: She is alert and oriented to person, place, and time  No cranial nerve deficit  Skin: Skin is warm and dry  No rash noted  No cyanosis  Nails show no clubbing  Psychiatric: She has a normal mood and affect   Her speech is normal and behavior is normal  Judgment and thought content normal  Cognition and memory are normal

## 2018-09-14 ENCOUNTER — TELEPHONE (OUTPATIENT)
Dept: OBGYN CLINIC | Facility: MEDICAL CENTER | Age: 23
End: 2018-09-14

## 2018-09-14 NOTE — TELEPHONE ENCOUNTER
I don't believe this is a change for her  Looks like she was on Loloestrin prior to last visit and we just refilled it  There is no generic for this so not sure what she was taking before  Please advise continuing through third pack if she can tolerate it and bleeding isn't too heavy   If she perceives that it is too heavy then she should call back and we can try to figure out what she was previously on

## 2018-09-14 NOTE — TELEPHONE ENCOUNTER
Patient stopped in office, having increasing cramping since starting on name brand Lo Loestrin, requesting to talk with someone

## 2018-09-14 NOTE — TELEPHONE ENCOUNTER
Spoke with Pt today via phone call  Pt informed that her reported symptoms were addressed by [facility]  Pt informed that it appears she was on Lo Loestrin prior to her last visit and it was just refilled ; there is no generic for this so not sure what Pt was taking before  Pt advised to continue through third pack if she can tolerate it and bleeding isn't too heavy, if she perceives that bleeding is too heavy then she should call back office in order to try to figure what she was previously on as recommended by [facility]

## 2018-10-02 ENCOUNTER — TRANSCRIBE ORDERS (OUTPATIENT)
Dept: PHYSICAL THERAPY | Facility: MEDICAL CENTER | Age: 23
End: 2018-10-02

## 2018-10-02 DIAGNOSIS — M75.91 LESION OF RIGHT SHOULDER: Primary | ICD-10-CM

## 2018-10-08 ENCOUNTER — TRANSCRIBE ORDERS (OUTPATIENT)
Dept: PHYSICAL THERAPY | Facility: MEDICAL CENTER | Age: 23
End: 2018-10-08

## 2018-10-08 DIAGNOSIS — M75.91 LESION OF RIGHT SHOULDER: Primary | ICD-10-CM

## 2018-10-30 ENCOUNTER — OFFICE VISIT (OUTPATIENT)
Dept: URGENT CARE | Facility: MEDICAL CENTER | Age: 23
End: 2018-10-30
Payer: COMMERCIAL

## 2018-10-30 ENCOUNTER — APPOINTMENT (OUTPATIENT)
Dept: RADIOLOGY | Facility: MEDICAL CENTER | Age: 23
End: 2018-10-30
Payer: COMMERCIAL

## 2018-10-30 VITALS
SYSTOLIC BLOOD PRESSURE: 112 MMHG | DIASTOLIC BLOOD PRESSURE: 74 MMHG | OXYGEN SATURATION: 98 % | TEMPERATURE: 98.5 F | WEIGHT: 146.8 LBS | RESPIRATION RATE: 20 BRPM | HEART RATE: 70 BPM | HEIGHT: 68 IN | BODY MASS INDEX: 22.25 KG/M2

## 2018-10-30 DIAGNOSIS — M25.562 ACUTE PAIN OF LEFT KNEE: ICD-10-CM

## 2018-10-30 DIAGNOSIS — M25.562 ACUTE PAIN OF LEFT KNEE: Primary | ICD-10-CM

## 2018-10-30 PROCEDURE — 99213 OFFICE O/P EST LOW 20 MIN: CPT | Performed by: PHYSICIAN ASSISTANT

## 2018-10-30 PROCEDURE — S9088 SERVICES PROVIDED IN URGENT: HCPCS | Performed by: PHYSICIAN ASSISTANT

## 2018-10-30 PROCEDURE — 73562 X-RAY EXAM OF KNEE 3: CPT

## 2018-10-30 NOTE — PROGRESS NOTES
[facility]      NAME: John John is a 25 y o  female  : 1995    MRN: [id number]  DATE: 2018  TIME: 7:05 PM    Assessment and Plan   Acute pain of left knee [M25 562]  1  Acute pain of left knee  XR knee 3 vw left non injury    Ambulatory referral to Orthopedic Surgery       Patient Instructions     Today you had an xray of your left knee that did not show any acute fractures  Rest, ice, compression, elevation  Ice 15-20 minutes 3-4 times a day  Tylenol and Motrin as needed for pain and swelling  Monitor for severe worsening of current symptoms, numbness, weakness, cold sensation distally into the extremity  With these symptoms follow up with PCP or ER immediately  Otherwise follow-up with PCP in 5-7 days if symptoms are not improving       Chief Complaint     Chief Complaint   Patient presents with    Knee Pain     Left knee pain x 3 weeks  Meniscus repair in   History of Present Illness   John John presents to the clinic c/o      [de-identified] old female who presents for evaluation spontaneous left-sided knee pain for last 3 weeks  Approximately 10 years ago, patient had been Diskus repair in that same knee she states for the last 3 weeks, the pain has been getting worse, and she feels like she has instability issues in the left knee  She feels like her left knee has been giving out on her  Review of Systems   Review of Systems   Respiratory: Negative  Cardiovascular: Negative  Musculoskeletal: Positive for arthralgias           Current Medications     Long-Term Prescriptions   Medication Sig Dispense Refill    Norethin-Eth Estrad-Fe Biphas (LO LOESTRIN FE) 1 MG-10 MCG / 10 MCG TABS Take 1 tablet by mouth daily 90 tablet 3       Current Allergies     Allergies as of 10/30/2018 - Reviewed 10/30/2018   Allergen Reaction Noted    Other  2016            The following portions of the patient's history were reviewed and updated as appropriate: allergies, current medications, past family history, past medical history, past social history, past surgical history and problem list     HISTORICAL INFO:  Past Medical History:   Diagnosis Date    Asthma      Past Surgical History:   Procedure Laterality Date    GANGLION CYST EXCISION      KNEE ARTHROSCOPY W/ MENISCAL REPAIR      SHOULDER SURGERY      TONSILLECTOMY         Objective   /74   Pulse 70   Temp 98 5 °F (36 9 °C) (Temporal)   Resp 20   Ht 5' 8" (1 727 m)   Wt 66 6 kg (146 lb 12 8 oz)   LMP 10/11/2018 (Exact Date)   SpO2 98%   BMI 22 32 kg/m²        Physical Exam     Physical Exam   Constitutional: She is oriented to person, place, and time  She appears well-developed and well-nourished  No distress  HENT:   Head: Normocephalic and atraumatic  Neck: Normal range of motion  Neck supple  Cardiovascular: Normal rate, regular rhythm and normal heart sounds  Pulmonary/Chest: Effort normal and breath sounds normal  No respiratory distress  She has no wheezes  She has no rales  Musculoskeletal:        Left knee: She exhibits no swelling, no effusion, no ecchymosis, no deformity, no laceration, normal alignment, no LCL laxity, normal patellar mobility, no bony tenderness and no MCL laxity  Tenderness (popliteal) found  No medial joint line, no lateral joint line, no MCL, no LCL and no patellar tendon tenderness noted  Neurological: She is alert and oriented to person, place, and time  Skin: Skin is warm and dry  She is not diaphoretic  Nursing note and vitals reviewed  M*Modal software was used to dictate this note  It may contain errors with dictating incorrect words/spelling  Please contact provider directly for any questions

## 2018-10-30 NOTE — PATIENT INSTRUCTIONS

## 2018-11-02 ENCOUNTER — OFFICE VISIT (OUTPATIENT)
Dept: OBGYN CLINIC | Facility: MEDICAL CENTER | Age: 23
End: 2018-11-02
Payer: COMMERCIAL

## 2018-11-02 VITALS
WEIGHT: 141.4 LBS | RESPIRATION RATE: 16 BRPM | SYSTOLIC BLOOD PRESSURE: 115 MMHG | HEART RATE: 65 BPM | HEIGHT: 68 IN | DIASTOLIC BLOOD PRESSURE: 73 MMHG | BODY MASS INDEX: 21.43 KG/M2

## 2018-11-02 DIAGNOSIS — M25.562 ACUTE PAIN OF LEFT KNEE: Primary | ICD-10-CM

## 2018-11-02 DIAGNOSIS — M23.92 INTERNAL DERANGEMENT OF LEFT KNEE: ICD-10-CM

## 2018-11-02 PROCEDURE — 99204 OFFICE O/P NEW MOD 45 MIN: CPT | Performed by: ORTHOPAEDIC SURGERY

## 2018-11-02 NOTE — PROGRESS NOTES
Assessment:  1  Acute pain of left knee       Patient Active Problem List   Diagnosis    ADD (attention deficit disorder)    Asthma    Dysmenorrhea in adolescent    Folliculitis    Scoliosis    Urticaria    Oral contraceptive pill surveillance    Breast asymmetry    Encounter for gynecological examination (general) (routine) without abnormal findings    History of ovarian cyst           Plan      MRI left knee to r/o medial meniscus tear  Follow up after MRI to discuss further treatment options  Patient was seen and examined with Dr Agusto Galvin  Subjective:     Patient ID:    Chief Complaint:Amira KOVACS Dieter 25 y o  female      HPI    A 22-year-old [employer] employee presents today complaining of left medial knee pain  This pain began in September without injury or exacerbating event  Ashermitchell Aguirre has noticed multiple episodes of instability and discomfort to touch in the medial aspect of the left knee since then as well as some associated swellin  She has a history of a left knee arthroscopy done in 6 grade for a medial meniscus repair after a softball injury  She also had a history of the partial MCL tear  Up until this past September she has had no left knee symptoms  The following portions of the patient's history were reviewed and updated as appropriate: allergies, current medications, past family history, past social history, past surgical history and problem list         Social History     Social History    Marital status: Single     Spouse name: N/A    Number of children: N/A    Years of education: N/A     Occupational History    Not on file       Social History Main Topics    Smoking status: Never Smoker    Smokeless tobacco: Never Used    Alcohol use No    Drug use: No    Sexual activity: Yes     Partners: Male     Birth control/ protection: OCP     Other Topics Concern    Not on file     Social History Narrative    No narrative on file     Past Medical History: Diagnosis Date    Asthma      Past Surgical History:   Procedure Laterality Date    GANGLION CYST EXCISION      KNEE ARTHROSCOPY W/ MENISCAL REPAIR      SHOULDER SURGERY      TONSILLECTOMY       No Known Allergies  Current Outpatient Prescriptions on File Prior to Visit   Medication Sig Dispense Refill    albuterol (PROAIR HFA) 90 mcg/act inhaler ProAir HFA 90 mcg/actuation aerosol inhaler      Norethin-Eth Estrad-Fe Biphas (LO LOESTRIN FE) 1 MG-10 MCG / 10 MCG TABS Take 1 tablet by mouth daily 90 tablet 3     No current facility-administered medications on file prior to visit  Objective:    Review of Systems   Constitutional: Negative  HENT: Negative  Eyes: Negative  Respiratory: Negative  Cardiovascular: Negative  Gastrointestinal: Negative  Endocrine: Negative  Genitourinary: Negative  Musculoskeletal: Negative  Skin: Negative  Allergic/Immunologic: Negative  Neurological: Negative  Hematological: Negative  Psychiatric/Behavioral: Negative  Right Knee Exam   Right knee exam is normal       Left Knee Exam     Tenderness   The patient is experiencing tenderness in the medial joint line  Range of Motion   Extension: 0   Flexion: 130     Tests   Haroldo:  Medial - positive Lateral - negative  Lachman:  Anterior - negative      Drawer:       Anterior - negative       Varus: negative  Valgus: negative  Pivot Shift: negative  Patellar Apprehension: negative    Other   Erythema: absent  Scars: absent  Sensation: normal  Pulse: present  Swelling: none  Effusion: no effusion present            Physical Exam   Constitutional: She is oriented to person, place, and time  She appears well-developed and well-nourished  Eyes: EOM are normal    Neck: Neck supple  Cardiovascular: Normal rate and regular rhythm  Pulmonary/Chest: Effort normal    Abdominal: Soft  Musculoskeletal:        Left knee: She exhibits no effusion     Neurological: She is alert and oriented to person, place, and time  Skin: Skin is warm and dry  No erythema  No pallor  Psychiatric: She has a normal mood and affect  Her behavior is normal  Thought content normal    Nursing note and vitals reviewed  I have personally reviewed pertinent films in PACS and my interpretation is no acute pathology with well maintained joint spaces         Portions of the record may have been created with voice recognition software   Occasional wrong word or "sound a like" substitutions may have occurred due to the inherent limitations of voice recognition software   Read the chart carefully and recognize, using context, where substitutions have occurred

## 2018-11-06 ENCOUNTER — HOSPITAL ENCOUNTER (OUTPATIENT)
Dept: MRI IMAGING | Facility: HOSPITAL | Age: 23
Discharge: HOME | End: 2018-11-06
Payer: COMMERCIAL

## 2018-11-06 DIAGNOSIS — M25.562 ACUTE PAIN OF LEFT KNEE: ICD-10-CM

## 2018-11-06 PROCEDURE — 73721 MRI JNT OF LWR EXTRE W/O DYE: CPT

## 2018-11-12 ENCOUNTER — TRANSCRIBE ORDERS (OUTPATIENT)
Dept: PHYSICAL THERAPY | Facility: MEDICAL CENTER | Age: 23
End: 2018-11-12

## 2018-11-13 ENCOUNTER — OFFICE VISIT (OUTPATIENT)
Dept: OBGYN CLINIC | Facility: HOSPITAL | Age: 23
End: 2018-11-13
Payer: COMMERCIAL

## 2018-11-13 VITALS
BODY MASS INDEX: 21.52 KG/M2 | HEART RATE: 78 BPM | SYSTOLIC BLOOD PRESSURE: 105 MMHG | DIASTOLIC BLOOD PRESSURE: 70 MMHG | HEIGHT: 68 IN | WEIGHT: 141.98 LBS

## 2018-11-13 DIAGNOSIS — M25.562 ACUTE PAIN OF LEFT KNEE: Primary | ICD-10-CM

## 2018-11-13 DIAGNOSIS — S83.242A OTHER TEAR OF MEDIAL MENISCUS, CURRENT INJURY, LEFT KNEE, INITIAL ENCOUNTER: ICD-10-CM

## 2018-11-13 PROCEDURE — 99213 OFFICE O/P EST LOW 20 MIN: CPT | Performed by: ORTHOPAEDIC SURGERY

## 2018-11-13 NOTE — PROGRESS NOTES
23 y o female presents for continuing care of left knee pain  She has had increasing pain and has mechanical symptoms to her knee contoinually  She has a history of meniscal repair procedure in the past  She presents today do discuss her MRI  Review of Systems  Review of systems negative unless otherwise specified in HPI    Past Medical History  Past Medical History:   Diagnosis Date    Asthma        Past Surgical History  Past Surgical History:   Procedure Laterality Date    GANGLION CYST EXCISION      KNEE ARTHROSCOPY W/ MENISCAL REPAIR      SHOULDER SURGERY      TONSILLECTOMY         Current Medications  Current Outpatient Prescriptions on File Prior to Visit   Medication Sig Dispense Refill    albuterol (PROAIR HFA) 90 mcg/act inhaler ProAir HFA 90 mcg/actuation aerosol inhaler      Norethin-Eth Estrad-Fe Biphas (LO LOESTRIN FE) 1 MG-10 MCG / 10 MCG TABS Take 1 tablet by mouth daily 90 tablet 3     No current facility-administered medications on file prior to visit          Recent Labs [facility])    0  Lab Value Date/Time   HCT 38 7 03/10/2018 0821   HGB 12 7 03/10/2018 0821   WBC 6 31 03/10/2018 0821   ESR 7 03/10/2018 0821   CRP 9 3 (H) 03/10/2018 0821   HGBA1C 5 1 08/03/2018 1129         Physical exam  General: Awake, Alert, Oriented  HEENT: No scleral injection, no evidence of facial trauma  Heart: Extremities warm and well perfused  Lungs: No audible wheezing  ·   left Knee exam  · Pain medial joint line, none laterally  · No effusion  · Positive Haroldo's test eliciting pain  · Extremity warm/well perfused    Procedure  None    Imaging  MRI of left knee reveals posterior horn medial meniscus signal on subsequent MRI slices indicating meniscal tear    A/P: 23F with L knee recurrent medial meniscus tear  Plan:  WBAt LLE  Aftre discussing risks, benefits, alternatives, complications, patient elects to undergo left knee arthroscopy with partial medial meniscectomy and any additional indicated procedures  Follow-up in clinic in routine post-operative visit    Erasmo Parsons  11/13/18

## 2018-11-15 ENCOUNTER — APPOINTMENT (OUTPATIENT)
Dept: LAB | Facility: MEDICAL CENTER | Age: 23
End: 2018-11-15
Payer: COMMERCIAL

## 2018-11-15 DIAGNOSIS — S83.242A OTHER TEAR OF MEDIAL MENISCUS, CURRENT INJURY, LEFT KNEE, INITIAL ENCOUNTER: ICD-10-CM

## 2018-11-15 LAB — B-HCG SERPL-ACNC: <2 MIU/ML

## 2018-11-15 PROCEDURE — 36415 COLL VENOUS BLD VENIPUNCTURE: CPT

## 2018-11-15 PROCEDURE — 84702 CHORIONIC GONADOTROPIN TEST: CPT

## 2018-12-06 ENCOUNTER — ANESTHESIA (OUTPATIENT)
Dept: PERIOP | Facility: HOSPITAL | Age: 23
End: 2018-12-06
Payer: COMMERCIAL

## 2018-12-06 ENCOUNTER — ANESTHESIA EVENT (OUTPATIENT)
Dept: PERIOP | Facility: HOSPITAL | Age: 23
End: 2018-12-06
Payer: COMMERCIAL

## 2018-12-06 ENCOUNTER — HOSPITAL ENCOUNTER (OUTPATIENT)
Facility: HOSPITAL | Age: 23
Setting detail: OUTPATIENT SURGERY
Discharge: HOME | End: 2018-12-06
Attending: ORTHOPAEDIC SURGERY | Admitting: ORTHOPAEDIC SURGERY
Payer: COMMERCIAL

## 2018-12-06 VITALS
WEIGHT: 145 LBS | DIASTOLIC BLOOD PRESSURE: 69 MMHG | RESPIRATION RATE: 18 BRPM | TEMPERATURE: 100.5 F | HEIGHT: 68 IN | BODY MASS INDEX: 21.98 KG/M2 | OXYGEN SATURATION: 100 % | HEART RATE: 67 BPM | SYSTOLIC BLOOD PRESSURE: 137 MMHG

## 2018-12-06 DIAGNOSIS — S83.242A OTHER TEAR OF MEDIAL MENISCUS, CURRENT INJURY, LEFT KNEE, INITIAL ENCOUNTER: Primary | ICD-10-CM

## 2018-12-06 LAB — EXT PREGNANCY TEST URINE: NEGATIVE

## 2018-12-06 PROCEDURE — 29875 ARTHRS KNEE SURG SYNVCT LMTD: CPT | Performed by: ORTHOPAEDIC SURGERY

## 2018-12-06 PROCEDURE — 81025 URINE PREGNANCY TEST: CPT | Performed by: ORTHOPAEDIC SURGERY

## 2018-12-06 PROCEDURE — G0289 ARTHRO, LOOSE BODY + CHONDRO: HCPCS | Performed by: ORTHOPAEDIC SURGERY

## 2018-12-06 RX ORDER — ONDANSETRON 2 MG/ML
4 INJECTION INTRAMUSCULAR; INTRAVENOUS EVERY 6 HOURS PRN
Status: DISCONTINUED | OUTPATIENT
Start: 2018-12-06 | End: 2018-12-06 | Stop reason: HOSPADM

## 2018-12-06 RX ORDER — ONDANSETRON 2 MG/ML
INJECTION INTRAMUSCULAR; INTRAVENOUS AS NEEDED
Status: DISCONTINUED | OUTPATIENT
Start: 2018-12-06 | End: 2018-12-06 | Stop reason: SURG

## 2018-12-06 RX ORDER — SODIUM CHLORIDE, SODIUM LACTATE, POTASSIUM CHLORIDE, CALCIUM CHLORIDE 600; 310; 30; 20 MG/100ML; MG/100ML; MG/100ML; MG/100ML
50 INJECTION, SOLUTION INTRAVENOUS CONTINUOUS
Status: DISCONTINUED | OUTPATIENT
Start: 2018-12-06 | End: 2018-12-06 | Stop reason: HOSPADM

## 2018-12-06 RX ORDER — FENTANYL CITRATE 50 UG/ML
INJECTION, SOLUTION INTRAMUSCULAR; INTRAVENOUS AS NEEDED
Status: DISCONTINUED | OUTPATIENT
Start: 2018-12-06 | End: 2018-12-06 | Stop reason: SURG

## 2018-12-06 RX ORDER — OXYCODONE HYDROCHLORIDE 5 MG/1
5 TABLET ORAL EVERY 4 HOURS PRN
Status: DISCONTINUED | OUTPATIENT
Start: 2018-12-06 | End: 2018-12-06 | Stop reason: HOSPADM

## 2018-12-06 RX ORDER — PROPOFOL 10 MG/ML
INJECTION, EMULSION INTRAVENOUS AS NEEDED
Status: DISCONTINUED | OUTPATIENT
Start: 2018-12-06 | End: 2018-12-06 | Stop reason: SURG

## 2018-12-06 RX ORDER — SODIUM CHLORIDE, SODIUM LACTATE, POTASSIUM CHLORIDE, CALCIUM CHLORIDE 600; 310; 30; 20 MG/100ML; MG/100ML; MG/100ML; MG/100ML
20 INJECTION, SOLUTION INTRAVENOUS CONTINUOUS
Status: DISCONTINUED | OUTPATIENT
Start: 2018-12-06 | End: 2018-12-06 | Stop reason: HOSPADM

## 2018-12-06 RX ORDER — LIDOCAINE HYDROCHLORIDE 10 MG/ML
INJECTION, SOLUTION INFILTRATION; PERINEURAL AS NEEDED
Status: DISCONTINUED | OUTPATIENT
Start: 2018-12-06 | End: 2018-12-06 | Stop reason: SURG

## 2018-12-06 RX ORDER — MIDAZOLAM HYDROCHLORIDE 1 MG/ML
INJECTION INTRAMUSCULAR; INTRAVENOUS AS NEEDED
Status: DISCONTINUED | OUTPATIENT
Start: 2018-12-06 | End: 2018-12-06 | Stop reason: SURG

## 2018-12-06 RX ORDER — KETOROLAC TROMETHAMINE 10 MG/1
10 TABLET, FILM COATED ORAL EVERY 6 HOURS PRN
Qty: 20 TABLET | Refills: 0 | Status: SHIPPED | OUTPATIENT
Start: 2018-12-06 | End: 2018-12-14

## 2018-12-06 RX ORDER — BUPIVACAINE HYDROCHLORIDE 2.5 MG/ML
INJECTION, SOLUTION EPIDURAL; INFILTRATION; INTRACAUDAL AS NEEDED
Status: DISCONTINUED | OUTPATIENT
Start: 2018-12-06 | End: 2018-12-06 | Stop reason: HOSPADM

## 2018-12-06 RX ORDER — KETOROLAC TROMETHAMINE 30 MG/ML
INJECTION, SOLUTION INTRAMUSCULAR; INTRAVENOUS AS NEEDED
Status: DISCONTINUED | OUTPATIENT
Start: 2018-12-06 | End: 2018-12-06 | Stop reason: SURG

## 2018-12-06 RX ORDER — FENTANYL CITRATE/PF 50 MCG/ML
50 SYRINGE (ML) INJECTION
Status: DISCONTINUED | OUTPATIENT
Start: 2018-12-06 | End: 2018-12-06 | Stop reason: HOSPADM

## 2018-12-06 RX ORDER — OXYCODONE HYDROCHLORIDE 5 MG/1
TABLET ORAL
Qty: 30 TABLET | Refills: 0 | Status: SHIPPED | OUTPATIENT
Start: 2018-12-06 | End: 2018-12-10

## 2018-12-06 RX ORDER — ALBUTEROL SULFATE 2.5 MG/3ML
2.5 SOLUTION RESPIRATORY (INHALATION) ONCE AS NEEDED
Status: DISCONTINUED | OUTPATIENT
Start: 2018-12-06 | End: 2018-12-06 | Stop reason: HOSPADM

## 2018-12-06 RX ORDER — CEFAZOLIN SODIUM 2 G/50ML
2000 SOLUTION INTRAVENOUS ONCE
Status: COMPLETED | OUTPATIENT
Start: 2018-12-06 | End: 2018-12-06

## 2018-12-06 RX ORDER — METOCLOPRAMIDE HYDROCHLORIDE 5 MG/ML
10 INJECTION INTRAMUSCULAR; INTRAVENOUS ONCE AS NEEDED
Status: DISCONTINUED | OUTPATIENT
Start: 2018-12-06 | End: 2018-12-06 | Stop reason: HOSPADM

## 2018-12-06 RX ORDER — PROMETHAZINE HYDROCHLORIDE 25 MG/ML
12.5 INJECTION, SOLUTION INTRAMUSCULAR; INTRAVENOUS ONCE AS NEEDED
Status: DISCONTINUED | OUTPATIENT
Start: 2018-12-06 | End: 2018-12-06 | Stop reason: HOSPADM

## 2018-12-06 RX ORDER — ONDANSETRON 2 MG/ML
4 INJECTION INTRAMUSCULAR; INTRAVENOUS ONCE AS NEEDED
Status: DISCONTINUED | OUTPATIENT
Start: 2018-12-06 | End: 2018-12-06 | Stop reason: HOSPADM

## 2018-12-06 RX ORDER — HYDROMORPHONE HCL/PF 1 MG/ML
0.4 SYRINGE (ML) INJECTION
Status: DISCONTINUED | OUTPATIENT
Start: 2018-12-06 | End: 2018-12-06 | Stop reason: HOSPADM

## 2018-12-06 RX ADMIN — KETOROLAC TROMETHAMINE 30 MG: 30 INJECTION, SOLUTION INTRAMUSCULAR at 10:45

## 2018-12-06 RX ADMIN — SODIUM CHLORIDE, SODIUM LACTATE, POTASSIUM CHLORIDE, AND CALCIUM CHLORIDE 20 ML/HR: .6; .31; .03; .02 INJECTION, SOLUTION INTRAVENOUS at 07:59

## 2018-12-06 RX ADMIN — PROPOFOL 200 MG: 10 INJECTION, EMULSION INTRAVENOUS at 10:03

## 2018-12-06 RX ADMIN — ONDANSETRON 4 MG: 2 INJECTION INTRAMUSCULAR; INTRAVENOUS at 10:06

## 2018-12-06 RX ADMIN — CEFAZOLIN SODIUM 2000 MG: 2 SOLUTION INTRAVENOUS at 10:11

## 2018-12-06 RX ADMIN — DEXAMETHASONE SODIUM PHOSPHATE 4 MG: 10 INJECTION INTRAMUSCULAR; INTRAVENOUS at 10:06

## 2018-12-06 RX ADMIN — LIDOCAINE HYDROCHLORIDE 50 MG: 10 INJECTION, SOLUTION INFILTRATION; PERINEURAL at 10:03

## 2018-12-06 RX ADMIN — OXYCODONE HYDROCHLORIDE 5 MG: 5 TABLET ORAL at 11:48

## 2018-12-06 RX ADMIN — FENTANYL CITRATE 50 MCG: 50 INJECTION, SOLUTION INTRAMUSCULAR; INTRAVENOUS at 10:00

## 2018-12-06 RX ADMIN — MIDAZOLAM 2 MG: 1 INJECTION INTRAMUSCULAR; INTRAVENOUS at 10:00

## 2018-12-06 NOTE — PROGRESS NOTES
Assumed care of patient at 1205 hours  Report received from Sheila Toledo in [address]  Patient in supine position with HOB elevated to 60 degrees  In NAD  Noted tolerated light snack PO  Pt's mother Jackie Grief at bedside

## 2018-12-06 NOTE — ANESTHESIA POSTPROCEDURE EVALUATION
Post-Op Assessment Note      CV Status:  Stable    Mental Status:  Alert and awake    Hydration Status:  Euvolemic    PONV Controlled:  Controlled    Airway Patency:  Patent    Post Op Vitals Reviewed: Yes          Staff: CRNA           BP   117/62   Temp   98 5   Pulse  87   Resp   16   SpO2   99

## 2018-12-06 NOTE — INTERVAL H&P NOTE
H&P reviewed  After examining the patient I find no changes in the patients condition since the H&P had been written      Preop for left knee arthroscopy and I anticipate partial medial meniscectomy

## 2018-12-06 NOTE — H&P (VIEW-ONLY)
23 y o female presents for continuing care of left knee pain  She has had increasing pain and has mechanical symptoms to her knee contoinually  She has a history of meniscal repair procedure in the past  She presents today do discuss her MRI  Review of Systems  Review of systems negative unless otherwise specified in HPI    Past Medical History  Past Medical History:   Diagnosis Date    Asthma        Past Surgical History  Past Surgical History:   Procedure Laterality Date    GANGLION CYST EXCISION      KNEE ARTHROSCOPY W/ MENISCAL REPAIR      SHOULDER SURGERY      TONSILLECTOMY         Current Medications  Current Outpatient Prescriptions on File Prior to Visit   Medication Sig Dispense Refill    albuterol (PROAIR HFA) 90 mcg/act inhaler ProAir HFA 90 mcg/actuation aerosol inhaler      Norethin-Eth Estrad-Fe Biphas (LO LOESTRIN FE) 1 MG-10 MCG / 10 MCG TABS Take 1 tablet by mouth daily 90 tablet 3     No current facility-administered medications on file prior to visit          Recent Labs [facility])    0  Lab Value Date/Time   HCT 38 7 03/10/2018 0821   HGB 12 7 03/10/2018 0821   WBC 6 31 03/10/2018 0821   ESR 7 03/10/2018 0821   CRP 9 3 (H) 03/10/2018 0821   HGBA1C 5 1 08/03/2018 1129         Physical exam  General: Awake, Alert, Oriented  HEENT: No scleral injection, no evidence of facial trauma  Heart: Extremities warm and well perfused  Lungs: No audible wheezing  ·   left Knee exam  · Pain medial joint line, none laterally  · No effusion  · Positive Haroldo's test eliciting pain  · Extremity warm/well perfused    Procedure  None    Imaging  MRI of left knee reveals posterior horn medial meniscus signal on subsequent MRI slices indicating meniscal tear    A/P: 23F with L knee recurrent medial meniscus tear  Plan:  WBAt LLE  Aftre discussing risks, benefits, alternatives, complications, patient elects to undergo left knee arthroscopy with partial medial meniscectomy and any additional indicated procedures  Follow-up in clinic in routine post-operative visit    Rossy Harris  11/13/18

## 2018-12-06 NOTE — OP NOTE
OPERATIVE REPORT  PATIENT NAME: Tim Vang    :  1995  MRN: [id number]  Pt Location: [facility] OR ROOM 04    SURGERY DATE: 2018    Surgeon(s) and Role:     * Daphnie Montana MD - Primary     * Kimberly Joy PA-C - Ana M Montalvo MD - Assisting    Preop Diagnosis:  Other tear of medial meniscus, current injury, left knee, initial encounter [S83 242A]    Post-Op Diagnosis Codes:     * Other tear of medial meniscus, current injury, left knee, initial encounter [S83 242A]  Same, without meniscal tear, but with patellofemoral chondromalacia, and medial gutter synovitis  Procedure(s) (LRB):  KNEE ARTHROSCOPY; SYNOVECTOMY; PATELLOFEMORAL CHONDROPLASTY (Left)    Specimen(s):  * No specimens in log *    Estimated Blood Loss:   Minimal    Drains:       Anesthesia Type:   General    Operative Indications: Other tear of medial meniscus, current injury, left knee, initial encounter [S83 242A]      Operative Findings:  Effusion:  None  Medial compartment:  Synovitis along the medial gutter, medial meniscus intact to visual inspection and probing, healthy articular cartilage medial femoral condyle and proximal medial tibia  Notch:  Intact ACL, PCL  Lateral compartment:  Normal lateral meniscus, normal lateral femoral and tibial articular cartilage  Compartment:  Small patches of grade 2 chondromalacia along the retropatellar surface  Suprapatellar pouch:  No loose bodies, no synovial plica was    Complications:   None    Procedure and Technique: Following the induction of adequate level of general anesthesia, the left lower extremity then underwent sterile prep and drape  Antibiotics were administered  A standard anterolateral portal was created for introduction of the inflow cannula  The knee was then insufflated with saline  The arthroscope was introduced  Diagnostic arthroscopic examination of patient's left knee was then carried above-mentioned findings    A medial portal was established, utilizing a handheld shaver, a limited synovectomy was performed along the patch of inflamed synovium within the medial gutter  With the arthroscope in the patellofemoral compartment, a gentle shaving patellofemoral chondroplasty was performed  Care was taken to avoid creation D laminar flaps during these maneuvers  At the completion procedure, the knee was drained of saline, arthroscopic portals were then closed utilizing 2 O nylon suture  The joint was injected with Marcaine for postoperative pain relief  A sterile dressings applied    She was then awakened from general anesthesia, taken recovery room stable condition with plans to include follow up in the office as an outpatient   I was present for the entire procedure    Patient Disposition:  PACU     SIGNATURE: Angel Wooten MD  DATE: December 6, 2018  TIME: 10:53 AM

## 2018-12-06 NOTE — ANESTHESIA PREPROCEDURE EVALUATION
Review of Systems/Medical History  Patient summary reviewed  Chart reviewed  No history of anesthetic complications     Cardiovascular   Pulmonary  Asthma ,        GI/Hepatic            Endo/Other     GYN      Comment: Dysmenorrhea     Hematology   Musculoskeletal  Scoliosis ,   Comment: Left meniscal tear      Neurology   Psychology       Comment: ADD         Physical Exam    Airway    Mallampati score: I  TM Distance: >3 FB  Neck ROM: full     Dental   No notable dental hx     Cardiovascular  Cardiovascular exam normal    Pulmonary  Pulmonary exam normal Breath sounds clear to auscultation,     Other Findings        Anesthesia Plan  ASA Score- 2     Anesthesia Type- general with ASA Monitors  Additional Monitors:   Airway Plan: LMA  Plan Factors- Patient instructed to abstain from smoking on day of procedure       Induction- intravenous  Postoperative Plan- Plan for postoperative opioid use  Planned trial extubation    Informed Consent- Anesthetic plan and risks discussed with patient  I personally reviewed this patient with the CRNA  Discussed and agreed on the Anesthesia Plan with the CRNA             Lab Results   Component Value Date    WBC 6 31 03/10/2018    HGB 12 7 03/10/2018    HCT 38 7 03/10/2018    MCV 89 03/10/2018     03/10/2018     Lab Results   Component Value Date    CALCIUM 9 3 03/10/2018    K 4 4 03/10/2018    CO2 26 03/10/2018     03/10/2018    BUN 7 03/10/2018    CREATININE 0 90 03/10/2018     No results found for: INR, PROTIME        I, Dr Thomas Or, the attending physician, have personally seen and evaluated the patient prior to anesthetic care  I have reviewed the pre-anesthetic record, and other medical records if appropriate to the anesthetic care  If a CRNA is involved in the case, I have reviewed the CRNA assessment, if present, and agree  The patient is in a suitable condition to proceed with my formulated anesthetic plan

## 2018-12-06 NOTE — DISCHARGE INSTRUCTIONS
Discharge Instructions - Orthopedics  Vanita Smith 21 y o  female MRN: [id number]  Unit/Bed#: Operating Room    Weight Bearing Status:                                           Weight bearing as tolerated left lower extremity    Pain:  Continue analgesics as directed    Dressing Instructions:   Keep dressing clean, dry and intact until follow up appointment  Do not shower until follow up    PT/OT:  Continue PT/OT on outpatient basis as directed    Appt Instructions: If you do not have your appointment, please call the clinic at [phone] to f/u with Dr Rad Chan the office sooner if you experience any increased numbness/tingling in the extremities  Miscellaneous:  None    May remove dressing on Saturday 12/8 ; may shower, use soap and water only, dry and apply band aides  do not soak incision  Re-wrap with ace as needed  Ice 10 minutes on every hour for the first 48 hours  If you develop fever 101 4 orally, swelling of the knee, that it becomes red and hot call Dr Isabel JARAMILLO

## 2018-12-07 ENCOUNTER — OFFICE VISIT (OUTPATIENT)
Dept: OBGYN CLINIC | Facility: MEDICAL CENTER | Age: 23
End: 2018-12-07

## 2018-12-07 VITALS — WEIGHT: 145 LBS | HEIGHT: 68 IN | BODY MASS INDEX: 21.98 KG/M2

## 2018-12-07 DIAGNOSIS — Z47.89 AFTERCARE FOLLOWING SURGERY OF THE MUSCULOSKELETAL SYSTEM: Primary | ICD-10-CM

## 2018-12-07 PROCEDURE — 99024 POSTOP FOLLOW-UP VISIT: CPT | Performed by: ORTHOPAEDIC SURGERY

## 2018-12-07 NOTE — PROGRESS NOTES
18 hr following left knee arthroscopy, this patient is seen for evaluation of breakthrough bleeding in her left knee bandage  Exam finds a left knee that is not effused  Arthroscopic portals clean dry and healed  The calf is soft and nontender  Toes are warm, sensate, mobile  Assessment/plan:  1 day following left knee arthroscopy, this patient had a bandage reapplied and she was reassured    I would welcome the opportunity see her next week for suture removal from the left knee

## 2018-12-14 ENCOUNTER — OFFICE VISIT (OUTPATIENT)
Dept: OBGYN CLINIC | Facility: MEDICAL CENTER | Age: 23
End: 2018-12-14
Payer: COMMERCIAL

## 2018-12-14 VITALS
HEIGHT: 68 IN | SYSTOLIC BLOOD PRESSURE: 115 MMHG | HEART RATE: 75 BPM | BODY MASS INDEX: 21.28 KG/M2 | RESPIRATION RATE: 16 BRPM | DIASTOLIC BLOOD PRESSURE: 74 MMHG | WEIGHT: 140.4 LBS

## 2018-12-14 DIAGNOSIS — Z98.890 STATUS POST ARTHROSCOPY OF LEFT KNEE: Primary | ICD-10-CM

## 2018-12-14 DIAGNOSIS — M25.062 HEMARTHROSIS OF LEFT KNEE: ICD-10-CM

## 2018-12-14 PROCEDURE — 99024 POSTOP FOLLOW-UP VISIT: CPT | Performed by: ORTHOPAEDIC SURGERY

## 2018-12-14 PROCEDURE — 20610 DRAIN/INJ JOINT/BURSA W/O US: CPT | Performed by: ORTHOPAEDIC SURGERY

## 2018-12-14 RX ORDER — LIDOCAINE HYDROCHLORIDE 10 MG/ML
3 INJECTION, SOLUTION INFILTRATION; PERINEURAL
Status: COMPLETED | OUTPATIENT
Start: 2018-12-14 | End: 2018-12-14

## 2018-12-14 RX ORDER — BUPIVACAINE HYDROCHLORIDE 7.5 MG/ML
3 INJECTION, SOLUTION EPIDURAL; RETROBULBAR
Status: COMPLETED | OUTPATIENT
Start: 2018-12-14 | End: 2018-12-14

## 2018-12-14 RX ADMIN — BUPIVACAINE HYDROCHLORIDE 3 ML: 7.5 INJECTION, SOLUTION EPIDURAL; RETROBULBAR at 10:45

## 2018-12-14 RX ADMIN — LIDOCAINE HYDROCHLORIDE 3 ML: 10 INJECTION, SOLUTION INFILTRATION; PERINEURAL at 10:45

## 2018-12-14 NOTE — PROGRESS NOTES
Assessment:  1  Status post arthroscopy of left knee  Ambulatory referral to Physical Therapy   2  Hemarthrosis of left knee  Ambulatory referral to Physical Therapy    Large joint arthrocentesis       Plan:  1st scheduled post-op visit 8 days s/p left knee arthroscopy with hemarthrosis which was aspirated today in the office  ICE and elevate as indicated  Weight bearing and activities as tolerated  Will refer to formal physical therapy to improve her postoperative outcome  To do next visit:  Return in about 3 weeks (around 1/4/2019) for re-check  The above stated was discussed in layman's terms and the patient expressed understanding  All questions were answered to the patient's satisfaction  Scribe Attestation    I,:   Garrett Brantley am acting as a scribe while in the presence of the attending physician :        I,:   Amanda Smith MD personally performed the services described in this documentation    as scribed in my presence :              Subjective:   Jitendra Shirley is a 21 y o  female who presents at her originally scheduled 1st postop visit 1 week status post left knee arthroscopy synovectomy patellofemoral chondroplasty  She was seen last week 1 day postop for bandage change  She returns today with her mother  She states that she has crutches however left in the car and ambulates with a limp and severely guarded left lower extremity  She has a moderate amount of swelling  Denies any calf or thigh tenderness without signs of DVT  Denies any fevers        Review of systems negative unless otherwise specified in HPI    Past Medical History:   Diagnosis Date    Asthma        Past Surgical History:   Procedure Laterality Date    GANGLION CYST EXCISION      KNEE ARTHROSCOPY W/ MENISCAL REPAIR      TX KNEE SCOPE,SINGLE MENISECTOMY Left 12/6/2018    Procedure: KNEE ARTHROSCOPY; SYNOVECTOMY; PATELLOFEMORAL CHONDROPLASTY;  Surgeon: Amanda Smith MD;  Location: [facility];  Service: Orthopedics    SHOULDER SURGERY      TONSILLECTOMY         Family History   Problem Relation Age of Onset    Hypothyroidism Mother     No Known Problems Father     Breast cancer Paternal Grandmother     No Known Problems Maternal Grandmother     No Known Problems Maternal Grandfather        Social History     Occupational History    Not on file  Social History Main Topics    Smoking status: Never Smoker    Smokeless tobacco: Never Used    Alcohol use No    Drug use: No    Sexual activity: Yes     Partners: Male     Birth control/ protection: OCP         Current Outpatient Prescriptions:     albuterol (PROAIR HFA) 90 mcg/act inhaler, ProAir HFA 90 mcg/actuation aerosol inhaler, Disp: , Rfl:     Norethin-Eth Estrad-Fe Biphas (LO LOESTRIN FE) 1 MG-10 MCG / 10 MCG TABS, Take 1 tablet by mouth daily, Disp: 90 tablet, Rfl: 3    No Known Allergies         Vitals:    12/14/18 1022   BP: 115/74   Pulse: 75   Resp: 16       Objective:          Physical Exam                    Left Knee Exam     Tenderness   Left knee tenderness location: Diffusely tender  Other   Erythema: absent  Swelling: mild  Effusion: effusion present    Comments:    Healed arthroscopy portals  Restricted ROM due to guarding  Intact extensor mechanism with slight lag     Calf and thigh are soft and non-tender without signs of DVT            Diagnostics, reviewed and taken today if performed as documented:    None performed        Procedures, if performed today:    Large joint arthrocentesis  Date/Time: 12/14/2018 10:45 AM  Consent given by: patient  Site marked: site marked  Supporting Documentation  Indications: pain   Procedure Details  Location: knee - L knee  Preparation: Patient was prepped and draped in the usual sterile fashion  Needle size: 18 G  Ultrasound guidance: no  Medications administered: 3 mL lidocaine 1 %; 3 mL bupivacaine (PF) 0 75 %    Aspirate amount: 15 mL  Aspirate: bloody  Patient tolerance: patient tolerated the procedure well with no immediate complications  Dressing:  Sterile dressing applied            Portions of the record may have been created with voice recognition software   Occasional wrong word or "sound a like" substitutions may have occurred due to the inherent limitations of voice recognition software   Read the chart carefully and recognize, using context, where substitutions have occurred

## 2018-12-17 ENCOUNTER — EVALUATION (OUTPATIENT)
Dept: PHYSICAL THERAPY | Facility: MEDICAL CENTER | Age: 23
End: 2018-12-17
Payer: COMMERCIAL

## 2018-12-17 DIAGNOSIS — Z98.890 STATUS POST ARTHROSCOPY OF LEFT KNEE: ICD-10-CM

## 2018-12-17 DIAGNOSIS — M25.062 HEMARTHROSIS OF LEFT KNEE: ICD-10-CM

## 2018-12-17 PROCEDURE — G8990 OTHER PT/OT CURRENT STATUS: HCPCS | Performed by: PHYSICAL THERAPIST

## 2018-12-17 PROCEDURE — 97110 THERAPEUTIC EXERCISES: CPT | Performed by: PHYSICAL THERAPIST

## 2018-12-17 PROCEDURE — G8991 OTHER PT/OT GOAL STATUS: HCPCS | Performed by: PHYSICAL THERAPIST

## 2018-12-17 PROCEDURE — 97161 PT EVAL LOW COMPLEX 20 MIN: CPT | Performed by: PHYSICAL THERAPIST

## 2018-12-17 NOTE — PROGRESS NOTES
PT Evaluation     Today's date: 2018  Patient name: Eloy Jimenez  : 1995  MRN: [id number]  Referring provider: Jing Etienne MD  Dx:   Encounter Diagnosis     ICD-10-CM    1  Status post arthroscopy of left knee Z98 890 Ambulatory referral to Physical Therapy   2  Hemarthrosis of left knee M25 062 Ambulatory referral to Physical Therapy                  Assessment  Assessment details: Eloy Jimenez is a 21 y o  female who presents with Status post arthroscopy of left knee  Hemarthrosis of left knee  Patient presents alert and oriented with the above impairments  [facility] will benefit from PT to addres deficits in order to maximize and return to prior level of function  No further referral appears necessary at this time based upon examination results  Prognosis is good given HEP compliance  Please contact me if you have any questions or recommendations  Impairments: abnormal gait, abnormal or restricted ROM, abnormal movement, activity intolerance, impaired physical strength, lacks appropriate home exercise program and pain with function  Understanding of Dx/Px/POC: excellent  Goals  Short Term Goals:   1  Pain decreased 2 ratings in 4 weeks  2  ROM increased 10* in 4 weeks  3  Strength increased 1/2 grade in 4 weeks    Long Term Goals:   1  ADLS/IADLS in related activities improved to maximal level in 8 weeks  2  Work performance improved to maximal level in 8 weeks  3  [place] with HEP in 8 weeks      Plan  Patient would benefit from: skilled physical therapy  Planned modality interventions: cryotherapy  Planned therapy interventions: flexibility, therapeutic exercise, stretching, strengthening, patient education, neuromuscular re-education, manual therapy, home exercise program, gait training and functional ROM exercises  Frequency: 2x week  Duration in weeks: 8  Treatment plan discussed with: patient        Subjective Evaluation    History of Present Illness  Mechanism of injury: Pt reports menisectomy 11 years ago  A few months ago she began to experience L knee pain and scheduled w/ Dr Cydney Prasad  She was sent for MRI which revealed meniscus tear  Surgery was scheduled for Dec 6 and during surgery there was not a meniscus tear  She did have synovial fluid accumulation and also underwent debridement  At her post-op visit knee was aspirated and she was advised to begin PT  She presents today w/ reports of intermittent pain and stiffness in L knee  At times stiffness is worse following prolonged immobility  Weight bearing tolerance is about 1 hour  No sleep disturbance reported  She is navigating stairs w/ non-reciprocal pattern  She has been able to resume most daily activity  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  She has not yet returned to driving  Pt reports concern of blood clot due to history following menisectomy 11 years ago  Spoke with patient about s/s including pain, swelling, redness, warmth and to contact MD office or go to ER if any symptoms present  No calf swelling or tenderness noted today  Pain  At best pain ratin  At worst pain ratin  Location: L knee    Patient Goals  Patient goals for therapy: decreased pain, decreased edema, increased motion, increased strength, independence with ADLs/IADLs and return to work          Objective     Active Range of Motion   Left Knee   Flexion: 50 degrees   Extension: -10 degrees     Right Knee   Flexion: 142 degrees   Extension: 0 degrees     Passive Range of Motion   Left Knee   Flexion: 55 degrees   Extension: -3 degrees     Strength/Myotome Testing     Left Hip   Planes of Motion   Flexion: 3-  Abduction: 4+    Right Hip   Planes of Motion   Flexion: 5  Abduction: 5    Left Knee   Flexion: 3-  Extension: 3-    Right Knee   Flexion: 5  Extension: 5    Ambulation     Comments   Ambulates w/ sig loss of L knee flexion at push off and throughout swing phase  Flowsheet Rows      Most Recent Value   PT/OT G-Codes   Current Score  49   Projected Score  75   FOTO information reviewed  Yes   Assessment Type  Evaluation   G code set  Other PT/OT Primary   Other PT Primary Current Status ()  CK   Other PT Primary Goal Status ()  CJ          Precautions: none    Daily Treatment Diary     Manual  12/17            PROM L knee  nv                                                                    Exercise Diary  12/17            Recumbent bik nv            Quad sets 5"x10            Heel slides 10" x10            gastroc towel stretch 30"x3            SLR flex nv            SLR abd nv            Adductor squeezes nv            Hamstring stretch nv                                                                                                                                                                            Modalities

## 2018-12-19 ENCOUNTER — OFFICE VISIT (OUTPATIENT)
Dept: PHYSICAL THERAPY | Facility: MEDICAL CENTER | Age: 23
End: 2018-12-19
Payer: COMMERCIAL

## 2018-12-19 DIAGNOSIS — M25.062 HEMARTHROSIS OF LEFT KNEE: ICD-10-CM

## 2018-12-19 DIAGNOSIS — Z98.890 STATUS POST ARTHROSCOPY OF LEFT KNEE: Primary | ICD-10-CM

## 2018-12-19 PROCEDURE — 97110 THERAPEUTIC EXERCISES: CPT

## 2018-12-19 PROCEDURE — 97140 MANUAL THERAPY 1/> REGIONS: CPT

## 2018-12-19 NOTE — PROGRESS NOTES
Daily Note     Today's date: 2018  Patient name: Jitendra Current  : 1995  MRN: [id number]  Referring provider: Connie Hines MD  Dx:   Encounter Diagnosis     ICD-10-CM    1  Status post arthroscopy of left knee Z98 890    2  Hemarthrosis of left knee M25 062                   Subjective: Pt reports less swelling in knee after elevating LE and icing all day yesterday  Objective: See treatment diary below    Precautions: none    Daily Treatment Diary     Manual             PROM L knee  nv 10 min                                                                   Exercise Diary             Recumbent bike nv 10 min           Quad sets 5"x10 5"x15           Heel slides 10" x10 10"x10           gastroc towel stretch 30"x3 30"x3           SLR flex nv 10x           SLR abd nv 10x           Adductor squeezes nv 5"x20           Hamstring stretch nv 30"x3                                                                                                                                                                           Modalities              CP post knee elevated 10 min                                            Assessment: Tolerated treatment well  Patient demonstrated fatigue post treatment and would benefit from continued PT  Tightness noted in knee w/ PROM initially however post MT ROM improved and pain lessened  No complaints of pain throughout tx  Plan: Continue per plan of care

## 2018-12-26 ENCOUNTER — OFFICE VISIT (OUTPATIENT)
Dept: PHYSICAL THERAPY | Facility: MEDICAL CENTER | Age: 23
End: 2018-12-26
Payer: COMMERCIAL

## 2018-12-26 DIAGNOSIS — Z98.890 STATUS POST ARTHROSCOPY OF LEFT KNEE: Primary | ICD-10-CM

## 2018-12-26 DIAGNOSIS — M25.062 HEMARTHROSIS OF LEFT KNEE: ICD-10-CM

## 2018-12-26 PROCEDURE — 97140 MANUAL THERAPY 1/> REGIONS: CPT

## 2018-12-26 PROCEDURE — 97110 THERAPEUTIC EXERCISES: CPT

## 2018-12-26 NOTE — PROGRESS NOTES
Daily Note     Today's date: 2018  Patient name: Tayo Saravia  : 1995  MRN: [id number]  Referring provider: Don Jeff MD  Dx:   Encounter Diagnosis     ICD-10-CM    1  Status post arthroscopy of left knee Z98 890    2  Hemarthrosis of left knee M25 062                   Subjective: Pt reports knee felt great after LV however she reported increased pain and swelling again a few hours later  She also stated her foot got caught when walking and she felt a pop and since then she has had increased pain in distal quad  Objective: See treatment diary below    Precautions: none    Daily Treatment Diary     Manual            PROM L knee  nv 10 min 10 min                                                                  Exercise Diary            Recumbent bike nv 10 min 10 min          Quad sets 5"x10 5"x15 5"X15          Heel slides 10" x10 10"x10 10"x10          gastroc towel stretch 30"x3 30"x3 30"x3          SLR flex nv 10x 10x          SLR abd nv 10x 10x          Adductor squeezes nv 5"x20 5"x20          Hamstring stretch nv 30"x3 30"x3                                                                                                                                                                          Modalities             CP post knee elevated 10 min 10 MIN                                           Assessment: Tolerated treatment well  Patient demonstrated fatigue post treatment and would benefit from continued PT  Knee ROM started out tight initially again with improvement in motion post tx  Encouraged pt to perform heel slides at home when she feels tight  Plan: Continue per plan of care

## 2018-12-28 ENCOUNTER — APPOINTMENT (OUTPATIENT)
Dept: PHYSICAL THERAPY | Facility: MEDICAL CENTER | Age: 23
End: 2018-12-28
Payer: COMMERCIAL

## 2018-12-28 ENCOUNTER — OFFICE VISIT (OUTPATIENT)
Dept: PHYSICAL THERAPY | Facility: MEDICAL CENTER | Age: 23
End: 2018-12-28
Payer: COMMERCIAL

## 2018-12-28 DIAGNOSIS — M25.062 HEMARTHROSIS OF LEFT KNEE: ICD-10-CM

## 2018-12-28 DIAGNOSIS — Z98.890 STATUS POST ARTHROSCOPY OF LEFT KNEE: Primary | ICD-10-CM

## 2018-12-28 PROCEDURE — 97110 THERAPEUTIC EXERCISES: CPT | Performed by: PHYSICAL THERAPIST

## 2018-12-28 PROCEDURE — 97140 MANUAL THERAPY 1/> REGIONS: CPT | Performed by: PHYSICAL THERAPIST

## 2018-12-28 NOTE — PROGRESS NOTES
Daily Note     Today's date: 2018  Patient name: Linda Young  : 1995  MRN: [id number]  Referring provider: Brittnee Alvarez MD  Dx:   Encounter Diagnosis     ICD-10-CM    1  Status post arthroscopy of left knee Z98 890    2  Hemarthrosis of left knee M25 062                   Subjective: Pt reports relief following PT sessions that lasts through the day  However, knee stiffens up by the next morning  Continues to ambulate lacking L knee flexion  She does report compliance w/ self stretches at home  Objective: See treatment diary below    Precautions: none    Daily Treatment Diary     Manual           PROM L knee  nv 10 min 10 min 10 min                                                                 Exercise Diary           Recumbent bike nv 10 min 10 min 10 min         Quad sets 5"x10 5"x15 5"X15 5"x20         Heel slides 10" x10 10"x10 10"x10 10" x10         gastroc towel stretch 30"x3 30"x3 30"x3 30"x3         SLR flex nv 10x 10x x20         SLR abd nv 10x 10x x20         Adductor squeezes nv 5"x20 5"x20 5"x20         Hamstring stretch nv 30"x3 30"x3 30"x3                                                                                                                                                                         Modalities            CP post knee elevated 10 min 10 MIN 10 min                                          Assessment: Tolerated treatment well  Patient demonstrated fatigue post treatment and would benefit from continued PT  C/o tightness at end range flexion  Plan: Continue per plan of care

## 2019-01-02 ENCOUNTER — OFFICE VISIT (OUTPATIENT)
Dept: PHYSICAL THERAPY | Facility: MEDICAL CENTER | Age: 24
End: 2019-01-02
Payer: COMMERCIAL

## 2019-01-02 DIAGNOSIS — Z98.890 STATUS POST ARTHROSCOPY OF LEFT KNEE: Primary | ICD-10-CM

## 2019-01-02 DIAGNOSIS — M25.062 HEMARTHROSIS OF LEFT KNEE: ICD-10-CM

## 2019-01-02 PROCEDURE — 97112 NEUROMUSCULAR REEDUCATION: CPT

## 2019-01-02 PROCEDURE — 97140 MANUAL THERAPY 1/> REGIONS: CPT

## 2019-01-02 PROCEDURE — 97110 THERAPEUTIC EXERCISES: CPT

## 2019-01-02 NOTE — PROGRESS NOTES
Daily Note     Today's date: 2019  Patient name: Tayo Saravia  : 1995  MRN: [id number]  Referring provider: Don Jeff MD  Dx:   Encounter Diagnosis     ICD-10-CM    1  Status post arthroscopy of left knee Z98 890    2  Hemarthrosis of left knee M25 062                   Subjective: Pt reports relief following PT sessions that lasts through the day  However, knee stiffens up by the next morning  Continues to ambulate lacking L knee flexion  She does report compliance w/ self stretches at home  Objective: See treatment diary below    Precautions: none    Daily Treatment Diary     Manual   12        PROM L knee  nv 10 min 10 min 10 min 10 min                                                                Exercise Diary          Recumbent bike nv 10 min 10 min 10 min 10 min        Quad sets 5"x10 5"x15 5"X15 5"x20 5"x20        Heel slides 10" x10 10"x10 10"x10 10" x10 10"x10        gastroc towel stretch 30"x3 30"x3 30"x3 30"x3 30"x3        SLR flex nv 10x 10x x20 x20        SLR abd nv 10x 10x x20 x20        Adductor squeezes nv 5"x20 5"x20 5"x20 5"x20        Hamstring stretch nv 30"x3 30"x3 30"x3 30"x3        TM GT, heel toe - SL      3 mins        SAQ     5"x20        Mini Squats     x20                                                                                                                                 Modalities            CP post knee elevated 10 min 10 MIN 10 min                                          Assessment: Tolerated treatment well  Patient demonstrated fatigue post treatment and would benefit from continued PT  C/o tightness at end range flexion  Plan: Continue per plan of care

## 2019-01-04 ENCOUNTER — OFFICE VISIT (OUTPATIENT)
Dept: OBGYN CLINIC | Facility: MEDICAL CENTER | Age: 24
End: 2019-01-04

## 2019-01-04 ENCOUNTER — OFFICE VISIT (OUTPATIENT)
Dept: PHYSICAL THERAPY | Facility: MEDICAL CENTER | Age: 24
End: 2019-01-04
Payer: COMMERCIAL

## 2019-01-04 VITALS
SYSTOLIC BLOOD PRESSURE: 122 MMHG | DIASTOLIC BLOOD PRESSURE: 88 MMHG | HEIGHT: 68 IN | HEART RATE: 102 BPM | WEIGHT: 140 LBS | BODY MASS INDEX: 21.22 KG/M2

## 2019-01-04 DIAGNOSIS — M25.062 HEMARTHROSIS OF LEFT KNEE: ICD-10-CM

## 2019-01-04 DIAGNOSIS — Z98.890 STATUS POST ARTHROSCOPY OF LEFT KNEE: Primary | ICD-10-CM

## 2019-01-04 PROCEDURE — 97112 NEUROMUSCULAR REEDUCATION: CPT | Performed by: PHYSICAL THERAPIST

## 2019-01-04 PROCEDURE — 99024 POSTOP FOLLOW-UP VISIT: CPT | Performed by: PHYSICIAN ASSISTANT

## 2019-01-04 PROCEDURE — 97140 MANUAL THERAPY 1/> REGIONS: CPT | Performed by: PHYSICAL THERAPIST

## 2019-01-04 PROCEDURE — 97110 THERAPEUTIC EXERCISES: CPT | Performed by: PHYSICAL THERAPIST

## 2019-01-04 NOTE — PROGRESS NOTES
Assessment:  1  Status post arthroscopy of left knee         Plan:  WBAT LLE   Continue working with PT for quad strengthening and stiffness  Activities as tolerated   Continue ice and elevation as needed    To do next visit:  Patient doing well, follow up PRN     The above stated was discussed in layman's terms and the patient expressed understanding  All questions were answered to the patient's satisfaction  Subjective:   Magno Cartagena is a 21 y o  female who presents for one month follow up s/p arthroscopic treatment for a medial meniscus tear  She reports she is doing very well today and PT is progressing nicely  She reports some stiffness in the morning when she wakes up and feels as though her left knee is a little bigger than the right, but denies pain with walking or activities  She required drainage of her knee effusion at previous post op visits  Denies fever/chills, SOB, calf tenderness, or leg swelling  Review of systems negative unless otherwise specified in HPI    Past Medical History:   Diagnosis Date    Asthma        Past Surgical History:   Procedure Laterality Date    GANGLION CYST EXCISION      KNEE ARTHROSCOPY W/ MENISCAL REPAIR      OR KNEE SCOPE,SINGLE MENISECTOMY Left 12/6/2018    Procedure: KNEE ARTHROSCOPY; SYNOVECTOMY; PATELLOFEMORAL CHONDROPLASTY;  Surgeon: Ana Ramirez MD;  Location: [facility];  Service: Orthopedics    SHOULDER SURGERY      TONSILLECTOMY         Family History   Problem Relation Age of Onset    Hypothyroidism Mother     No Known Problems Father     Breast cancer Paternal Grandmother     No Known Problems Maternal Grandmother     No Known Problems Maternal Grandfather        Social History     Occupational History    Not on file       Social History Main Topics    Smoking status: Never Smoker    Smokeless tobacco: Never Used    Alcohol use No    Drug use: No    Sexual activity: Yes     Partners: Male     Birth control/ protection: OCP Current Outpatient Prescriptions:     albuterol (PROAIR HFA) 90 mcg/act inhaler, ProAir HFA 90 mcg/actuation aerosol inhaler, Disp: , Rfl:     Norethin-Eth Estrad-Fe Biphas (LO LOESTRIN FE) 1 MG-10 MCG / 10 MCG TABS, Take 1 tablet by mouth daily, Disp: 90 tablet, Rfl: 3    No Known Allergies         Vitals:    01/04/19 1024   BP: 122/88   Pulse: 102       Objective:                    Right Knee Exam   Right knee exam is normal       Left Knee Exam     Comments:  Slightly enlarged left knee compared to right, but no effusion noted, enlargement due to subcutaneous tissue enlargement that is expected after surgery   No erythema  Moderate quadriceps atrophy on the surgical knee compared to right knee   No joint line tenderness  5/5 strength to flexion and extension  No instability or pain to varus or valgus stress            Diagnostics, reviewed and taken today if performed as documented:    None performed      Procedures, if performed today:    Procedures    None performed      Portions of the record may have been created with voice recognition software   Occasional wrong word or "sound a like" substitutions may have occurred due to the inherent limitations of voice recognition software   Read the chart carefully and recognize, using context, where substitutions have occurred

## 2019-01-04 NOTE — PROGRESS NOTES
Daily Note     Today's date: 2019  Patient name: Owen Cheadle  : 1995  MRN: [id number]  Referring provider: Ja Melton MD  Dx:   Encounter Diagnosis     ICD-10-CM    1  Status post arthroscopy of left knee Z98 890    2  Hemarthrosis of left knee M25 062                   Subjective:  Presents today ambulating w/ improved knee flexion prior to treatment  Reports continued gradual improvement  Objective: See treatment diary below    Precautions: none    Daily Treatment Diary     Manual         PROM L knee  nv 10 min 10 min 10 min 10 min 10 min                                                               Exercise Diary         Recumbent bike nv 10 min 10 min 10 min 10 min 10  min       Quad sets 5"x10 5"x15 5"X15 5"x20 5"x20 5"x20       Heel slides 10" x10 10"x10 10"x10 10" x10 10"x10 np       gastroc towel stretch 30"x3 30"x3 30"x3 30"x3 30"x3 30"x3       SLR flex nv 10x 10x x20 x20 x20       SLR abd nv 10x 10x x20 x20 x20       Adductor squeezes nv 5"x20 5"x20 5"x20 5"x20 NP       Hamstring stretch nv 30"x3 30"x3 30"x3 30"x3 30"x3       TM GT, heel toe - SL      3 mins 3 min       SAQ     5"x20 5"x20       Mini Squats     x20 x20       Step ups      6 in x10                                                                                                                   Modalities           CP post knee elevated 10 min 10 MIN 10 min np                                         Assessment: Tolerated treatment well  Patient demonstrated fatigue post treatment and would benefit from continued PT  Pt continues to intermittently ambulate significantly lacking knee flexion despite ROM being wfl  Able to perform squats w/ minimal compensation  Noted intermittent compensational hip hike w/ addition of step ups  Achieves normal heel toe gait pattern w/ adequate knee flexion w/ SL treadmill      Plan: Continue per plan of care

## 2019-01-07 ENCOUNTER — TELEPHONE (OUTPATIENT)
Dept: OBGYN CLINIC | Facility: HOSPITAL | Age: 24
End: 2019-01-07

## 2019-01-07 ENCOUNTER — APPOINTMENT (OUTPATIENT)
Dept: PHYSICAL THERAPY | Facility: MEDICAL CENTER | Age: 24
End: 2019-01-07
Payer: COMMERCIAL

## 2019-01-07 NOTE — TELEPHONE ENCOUNTER
Patient sees Dr Cindy Butler  She had surgery on her left knee on 12/06/18  She is calling regarding the following 2 questions:    Her work status letter states she should return to work on 01/31/19 but since it is a Thursday, she would like to know if she can go in on 02/04/19  She also states she wasn't told how long to go to therapy for  She's been going 2 times per week  Please advise      Patient callback [phone]

## 2019-01-07 NOTE — TELEPHONE ENCOUNTER
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

## 2019-01-07 NOTE — TELEPHONE ENCOUNTER
Spoke w/ pt and relayed message  She is going to a [address] facility for [employer] and will pick the note up there  Thank you

## 2019-01-07 NOTE — TELEPHONE ENCOUNTER
Message reviewed      And OPAL in the operating room    Patient was just seen Friday January 4th,   And doing well    Patient was released to p r n  Follow-up  There was no discussion regarding returning to work or staying out of work  Please call the patient,   And identify when her provisional return to work is        Thank you

## 2019-01-07 NOTE — TELEPHONE ENCOUNTER
Your answer reviewed    New return to work note for February 4th,   Entered electronically    She may continue therapy if she desires however she was found to be quite well on Friday January 4th    No further actions necessary,    Patient may have the work note

## 2019-01-08 ENCOUNTER — APPOINTMENT (OUTPATIENT)
Dept: PHYSICAL THERAPY | Facility: MEDICAL CENTER | Age: 24
End: 2019-01-08
Payer: COMMERCIAL

## 2019-01-08 ENCOUNTER — OFFICE VISIT (OUTPATIENT)
Dept: PHYSICAL THERAPY | Facility: MEDICAL CENTER | Age: 24
End: 2019-01-08
Payer: COMMERCIAL

## 2019-01-08 DIAGNOSIS — M25.062 HEMARTHROSIS OF LEFT KNEE: ICD-10-CM

## 2019-01-08 DIAGNOSIS — Z98.890 STATUS POST ARTHROSCOPY OF LEFT KNEE: Primary | ICD-10-CM

## 2019-01-08 PROCEDURE — 97110 THERAPEUTIC EXERCISES: CPT | Performed by: PHYSICAL THERAPIST

## 2019-01-08 PROCEDURE — 97112 NEUROMUSCULAR REEDUCATION: CPT | Performed by: PHYSICAL THERAPIST

## 2019-01-08 NOTE — PROGRESS NOTES
Daily Note     Today's date: 2019  Patient name: Saniya Wheatley  : 1995  MRN: [id number]  Referring provider: Rima De La Rosa MD  Dx:   Encounter Diagnosis     ICD-10-CM    1  Status post arthroscopy of left knee Z98 890    2  Hemarthrosis of left knee M25 062                   Subjective:  Pt reports increased pain yesterday for unknown reason  Today denies pain  Presents ambulating w/out deficits today  She had f/u w/ MD and was advised to complete PT progressing toward RTW on   No need for further MD f/u  Objective: See treatment diary below    Precautions: none    Daily Treatment Diary     Manual        PROM L knee  nv 10 min 10 min 10 min 10 min 10 min Ext only 5 min                                                              Exercise Diary        Recumbent bike nv 10 min 10 min 10 min 10 min 10  min 10 min      Quad sets 5"x10 5"x15 5"X15 5"x20 5"x20 5"x20 5"x20      Heel slides 10" x10 10"x10 10"x10 10" x10 10"x10 np np      gastroc towel stretch 30"x3 30"x3 30"x3 30"x3 30"x3 30"x3 30"x3      SLR flex nv 10x 10x x20 x20 x20 x20      SLR abd nv 10x 10x x20 x20 x20 x20      Adductor squeezes nv 5"x20 5"x20 5"x20 5"x20 NP np      Hamstring stretch nv 30"x3 30"x3 30"x3 30"x3 30"x3 30"x3      TM GT, heel toe - SL      3 mins 3 min np      SAQ     5"x20 5"x20 5"x20      Mini Squats     x20 x20 x20      Step ups      6 in x10 6in x20      SLR adduction       x20                                                                                                     Modalities          CP post knee elevated 10 min 10 MIN 10 min np np                                        Assessment: Tolerated treatment well  Patient demonstrated fatigue post treatment and would benefit from continued PT  Less compensation noted w/ step ups today  Plan: Continue per plan of care

## 2019-01-11 ENCOUNTER — OFFICE VISIT (OUTPATIENT)
Dept: PHYSICAL THERAPY | Facility: MEDICAL CENTER | Age: 24
End: 2019-01-11
Payer: COMMERCIAL

## 2019-01-11 DIAGNOSIS — M25.062 HEMARTHROSIS OF LEFT KNEE: ICD-10-CM

## 2019-01-11 DIAGNOSIS — Z98.890 STATUS POST ARTHROSCOPY OF LEFT KNEE: Primary | ICD-10-CM

## 2019-01-11 PROCEDURE — 97110 THERAPEUTIC EXERCISES: CPT | Performed by: PHYSICAL THERAPIST

## 2019-01-11 PROCEDURE — 97112 NEUROMUSCULAR REEDUCATION: CPT | Performed by: PHYSICAL THERAPIST

## 2019-01-11 NOTE — PROGRESS NOTES
Daily Note     Today's date: 2019  Patient name: Linda Young  : 1995  MRN: [id number]  Referring provider: Brittnee Alvarez MD  Dx:   Encounter Diagnosis     ICD-10-CM    1  Status post arthroscopy of left knee Z98 890    2  Hemarthrosis of left knee M25 062                   Subjective:  Pt offers no c/o pain prior to treatment today  Objective: See treatment diary below    Precautions: none    Daily Treatment Diary     Manual       PROM L knee  nv 10 min 10 min 10 min 10 min 10 min Ext only 5 min np                                                             Exercise Diary       Recumbent bike nv 10 min 10 min 10 min 10 min 10  min 10 min 10 min     Quad sets 5"x10 5"x15 5"X15 5"x20 5"x20 5"x20 5"x20 5"x20     Heel slides 10" x10 10"x10 10"x10 10" x10 10"x10 np np np     gastroc towel stretch 30"x3 30"x3 30"x3 30"x3 30"x3 30"x3 30"x3 30"x3     SLR flex nv 10x 10x x20 x20 x20 x20 x20     SLR abd nv 10x 10x x20 x20 x20 x20 x20     Adductor squeezes nv 5"x20 5"x20 5"x20 5"x20 NP np np     Hamstring stretch nv 30"x3 30"x3 30"x3 30"x3 30"x3 30"x3 30"x3     TM GT, heel toe - SL      3 mins 3 min np np     SAQ     5"x20 5"x20 5"x20 5"x20     Mini Squats     x20 x20 x20 x20     Step ups      6 in x10 6in x20 6in x20     SLR adduction       x20 x20     Lateral step ups        6 in x20     Step downs        4in x20                                                                          Modalities         CP post knee elevated 10 min 10 MIN 10 min np np np                                       Assessment: Tolerated treatment well  Patient demonstrated fatigue post treatment and would benefit from continued PT  Progressed exercise as charted;  Weakness noted w/ eccentric lowering  Plan: Continue per plan of care

## 2019-01-14 ENCOUNTER — TRANSCRIBE ORDERS (OUTPATIENT)
Dept: PHYSICAL THERAPY | Facility: MEDICAL CENTER | Age: 24
End: 2019-01-14

## 2019-01-15 ENCOUNTER — OFFICE VISIT (OUTPATIENT)
Dept: PHYSICAL THERAPY | Facility: MEDICAL CENTER | Age: 24
End: 2019-01-15
Payer: COMMERCIAL

## 2019-01-15 ENCOUNTER — TRANSCRIBE ORDERS (OUTPATIENT)
Dept: PHYSICAL THERAPY | Facility: MEDICAL CENTER | Age: 24
End: 2019-01-15

## 2019-01-15 DIAGNOSIS — M75.91 LESION OF RIGHT SHOULDER: Primary | ICD-10-CM

## 2019-01-15 DIAGNOSIS — Z98.890 STATUS POST ARTHROSCOPY OF LEFT KNEE: Primary | ICD-10-CM

## 2019-01-15 DIAGNOSIS — M25.062 HEMARTHROSIS OF LEFT KNEE: ICD-10-CM

## 2019-01-15 PROCEDURE — 97110 THERAPEUTIC EXERCISES: CPT

## 2019-01-15 NOTE — PROGRESS NOTES
Daily Note     Today's date: 1/15/2019  Patient name: Koby Escalera  : 1995  MRN: [id number]  Referring provider: Shellie Scott MD  Dx:   Encounter Diagnosis     ICD-10-CM    1  Status post arthroscopy of left knee Z98 890    2  Hemarthrosis of left knee M25 062                   Subjective:  Pt states knee has been feeling really good  She only has soreness when she kneels  She states most days she does not think about surgical leg  Objective: See treatment diary below    Precautions: none    Daily Treatment Diary     Manual  12/17 12/19 12/26 12/28 1/2 1/4 1/8 1/11 1/15    PROM L knee  nv 10 min 10 min 10 min 10 min 10 min Ext only 5 min np Ext 5 mins                                                            Exercise Diary  12/17 12/19 12/26 12/28 1/2 1/4 1/8 1/11 1/15    Recumbent bike nv 10 min 10 min 10 min 10 min 10  min 10 min 10 min 10 min sci fit L3    Quad sets 5"x10 5"x15 5"X15 5"x20 5"x20 5"x20 5"x20 5"x20 5"x20    Heel slides 10" x10 10"x10 10"x10 10" x10 10"x10 np np np np    gastroc towel stretch 30"x3 30"x3 30"x3 30"x3 30"x3 30"x3 30"x3 30"x3 30"x3    SLR flex nv 10x 10x x20 x20 x20 x20 x20 x20    SLR abd nv 10x 10x x20 x20 x20 x20 x20 x20    Adductor squeezes nv 5"x20 5"x20 5"x20 5"x20 NP np np np    Hamstring stretch nv 30"x3 30"x3 30"x3 30"x3 30"x3 30"x3 30"x3 30"x3    TM GT, heel toe - SL      3 mins 3 min np np np    SAQ     5"x20 5"x20 5"x20 5"x20 5"x20    Mini Squats     x20 x20 x20 x20 x20    Step ups      6 in x10 6in x20 6in x20 6 in x20    SLR adduction       x20 x20 x20    Lateral step ups        6 in x20 6 in x20    Step downs        4in x20 4 in x20                                                                         Modalities         CP post knee elevated 10 min 10 MIN 10 min np np np                                       Assessment: Tolerated treatment well   Patient demonstrated fatigue post treatment and would benefit from continued PT  Cues required to activate quad w SLR's/ lag remains and mild tightness noted w/ extension  Mild swelling persists  Plan: Continue per plan of care

## 2019-01-17 ENCOUNTER — APPOINTMENT (OUTPATIENT)
Dept: PHYSICAL THERAPY | Facility: MEDICAL CENTER | Age: 24
End: 2019-01-17
Payer: COMMERCIAL

## 2019-01-18 ENCOUNTER — APPOINTMENT (OUTPATIENT)
Dept: PHYSICAL THERAPY | Facility: MEDICAL CENTER | Age: 24
End: 2019-01-18
Payer: COMMERCIAL

## 2019-01-22 ENCOUNTER — OFFICE VISIT (OUTPATIENT)
Dept: PHYSICAL THERAPY | Facility: MEDICAL CENTER | Age: 24
End: 2019-01-22
Payer: COMMERCIAL

## 2019-01-22 DIAGNOSIS — M25.062 HEMARTHROSIS OF LEFT KNEE: ICD-10-CM

## 2019-01-22 DIAGNOSIS — Z98.890 STATUS POST ARTHROSCOPY OF LEFT KNEE: Primary | ICD-10-CM

## 2019-01-22 PROCEDURE — 97110 THERAPEUTIC EXERCISES: CPT

## 2019-01-22 NOTE — PROGRESS NOTES
Daily Note     Today's date: 2019  Patient name: Oly Johns  : 1995  MRN: [id number]  Referring provider: Kelly Syed MD  Dx:   Encounter Diagnosis     ICD-10-CM    1  Status post arthroscopy of left knee Z98 890    2  Hemarthrosis of left knee M25 062                   Subjective:  Pt reports overall she is doing better but at times her leg still gives out on her  Objective: See treatment diary below    Precautions: none    Daily Treatment Diary     Manual  12/17 12/19 12/26 12/28 1/2 1/4 1/8 1/11 1/15 1/22   PROM L knee  nv 10 min 10 min 10 min 10 min 10 min Ext only 5 min np Ext 5 mins Ext 5 mins                                                           Exercise Diary  12/17 12/19 12/26 12/28 1/2 1/4 1/8 1/11 1/15 1/22   Recumbent bike nv 10 min 10 min 10 min 10 min 10  min 10 min 10 min 10 min sci fit L3 10 mins   Quad sets 5"x10 5"x15 5"X15 5"x20 5"x20 5"x20 5"x20 5"x20 5"x20 hep   Heel slides 10" x10 10"x10 10"x10 10" x10 10"x10 np np np np np   gastroc towel stretch 30"x3 30"x3 30"x3 30"x3 30"x3 30"x3 30"x3 30"x3 30"x3 30"x3   SLR flex nv 10x 10x x20 x20 x20 x20 x20 x20 x20   SLR abd nv 10x 10x x20 x20 x20 x20 x20 x20 x20   Adductor squeezes nv 5"x20 5"x20 5"x20 5"x20 NP np np np np   Hamstring stretch nv 30"x3 30"x3 30"x3 30"x3 30"x3 30"x3 30"x3 30"x3 30"x3   TM GT, heel toe - SL      3 mins 3 min np np np np   SAQ     5"x20 5"x20 5"x20 5"x20 5"x20 5"x10   Mini Squats     x20 x20 x20 x20 x20    Step ups      6 in x10 6in x20 6in x20 6 in x20 6 in 20   SLR adduction       x20 x20 x20 x20   Lateral step ups        6 in x20 6 in x20 6 in x20   Step downs        4in x20 4 in x20 4 in x20   TKE          BTB 5"x15   SL LP sidelying          L16 x20                                              Modalities         CP post knee elevated 10 min 10 MIN 10 min np np np                                       Assessment: Tolerated treatment well   Patient demonstrated fatigue post treatment and would benefit from continued PT  Weakness in quad remains with lag during SLR and compensating noted w/ squats  Added side lying SL LP today and TKE to focus on strengthening quad  She reported some pain inferior to patella  She declined ice post      Plan: Continue per plan of care

## 2019-01-24 ENCOUNTER — OFFICE VISIT (OUTPATIENT)
Dept: PHYSICAL THERAPY | Facility: MEDICAL CENTER | Age: 24
End: 2019-01-24
Payer: COMMERCIAL

## 2019-01-24 DIAGNOSIS — M25.062 HEMARTHROSIS OF LEFT KNEE: ICD-10-CM

## 2019-01-24 DIAGNOSIS — Z98.890 STATUS POST ARTHROSCOPY OF LEFT KNEE: Primary | ICD-10-CM

## 2019-01-24 PROCEDURE — 97110 THERAPEUTIC EXERCISES: CPT

## 2019-01-24 NOTE — PROGRESS NOTES
Daily Note     Today's date: 2019  Patient name: Owen Cheadle  : 1995  MRN: [id number]  Referring provider: aJ Melton MD  Dx:   Encounter Diagnosis     ICD-10-CM    1  Status post arthroscopy of left knee Z98 890    2  Hemarthrosis of left knee M25 062                   Subjective:  Pt reports she had more swelling after last visit but overall pain has been minimal       Objective: See treatment diary below    Precautions: none    Daily Treatment Diary     Manual              PROM L knee  5 mins                                                                    Exercise Diary              Recumbent bike 10 mins            Quad sets 5"x10            Heel slides NP            gastroc towel stretch 30"x3            SLR flex 20x            SLR abd 20x            Adductor squeezes NP            Hamstring stretch 30x3            TM GT, heel toe - SL  NP            SAQ 5"x12            Mini Squats 20x            Step ups 6 in x20            SLR adduction 20x            Lateral step ups 6 in x20            Step downs 4 in x20            TKE GTB 5"x15            SL LP sidelying L16 x20                                                       Modalities         CP post knee elevated 10 min 10 MIN 10 min np np np                                       Assessment: Tolerated treatment well  Patient demonstrated fatigue post treatment and would benefit from continued PT  Pt continues to struggle w/ TKE  Less pain noted today w/ strenthening ex's  Plan: Continue per plan of care

## 2019-01-28 ENCOUNTER — OFFICE VISIT (OUTPATIENT)
Dept: PHYSICAL THERAPY | Facility: MEDICAL CENTER | Age: 24
End: 2019-01-28
Payer: COMMERCIAL

## 2019-01-28 DIAGNOSIS — Z98.890 STATUS POST ARTHROSCOPY OF LEFT KNEE: Primary | ICD-10-CM

## 2019-01-28 DIAGNOSIS — M25.062 HEMARTHROSIS OF LEFT KNEE: ICD-10-CM

## 2019-01-28 PROCEDURE — 97110 THERAPEUTIC EXERCISES: CPT

## 2019-01-28 NOTE — PROGRESS NOTES
Daily Note     Today's date: 2019  Patient name: Ketan Kaur  : 1995  MRN: [id number]  Referring provider: Brea Magana MD  Dx:   Encounter Diagnosis     ICD-10-CM    1  Status post arthroscopy of left knee Z98 890    2  Hemarthrosis of left knee M25 062                   Subjective:  Pt reports she is feeling a lot better and has been released to RTW next week  Objective: See treatment diary below    Precautions: none    Daily Treatment Diary     Manual             PROM L knee  ext 5 mins 5 mins                                                                   Exercise Diary             Recumbent bike 10 mins 10 mins            Quad sets 5"x10 5"x20           Heel slides NP np           gastroc towel stretch 30"x3 30"x3           SLR flex 20x 2x10           SLR abd 20x 2x10           Adductor squeezes NP NP           Hamstring stretch 30x3 30"x3           TM GT, heel toe - SL  NP np           SAQ 5"x12 5"x20           Mini Squats 20x 20x           Step ups 6 in x20 6 in x20           SLR adduction 20x 2x10           Lateral step ups 6 in x20 6 in x20           Step downs 4 in x20 4 in x20           TKE GTB 5"x15 GTB 5"x15           SL LP sidelying L16 x20 L16 x20                                                      Modalities         CP post knee elevated 10 min 10 MIN 10 min np np np                                       Assessment: Tolerated treatment well  Patient demonstrated fatigue post treatment and would benefit from continued PT   amb has normalized  No complaints of pain throughout  Plan: Continue per plan of care

## 2019-01-31 ENCOUNTER — OFFICE VISIT (OUTPATIENT)
Dept: PHYSICAL THERAPY | Facility: MEDICAL CENTER | Age: 24
End: 2019-01-31

## 2019-01-31 ENCOUNTER — OFFICE VISIT (OUTPATIENT)
Dept: PHYSICAL THERAPY | Facility: MEDICAL CENTER | Age: 24
End: 2019-01-31
Payer: COMMERCIAL

## 2019-01-31 DIAGNOSIS — Z98.890 STATUS POST ARTHROSCOPY OF LEFT KNEE: Primary | ICD-10-CM

## 2019-01-31 DIAGNOSIS — Z47.89 AFTERCARE FOLLOWING SURGERY OF THE MUSCULOSKELETAL SYSTEM: ICD-10-CM

## 2019-01-31 DIAGNOSIS — M25.562 ACUTE PAIN OF LEFT KNEE: ICD-10-CM

## 2019-01-31 DIAGNOSIS — M25.062 HEMARTHROSIS OF LEFT KNEE: ICD-10-CM

## 2019-01-31 PROCEDURE — G8979 MOBILITY GOAL STATUS: HCPCS

## 2019-01-31 PROCEDURE — G8980 MOBILITY D/C STATUS: HCPCS

## 2019-01-31 PROCEDURE — 97110 THERAPEUTIC EXERCISES: CPT

## 2019-01-31 NOTE — PROGRESS NOTES
Daily Note     Today's date: 2019  Patient name: Venu Lord  : 1995  MRN: [id number]  Referring provider: Vivian Smith MD  Dx:   Encounter Diagnosis     ICD-10-CM    1  Status post arthroscopy of left knee Z98 890    2  Hemarthrosis of left knee M25 062                   Subjective:  Pt denies knee pain and wants to make today her last PT session since she is going back to work next week  Objective: See treatment diary below    Precautions: none    Daily Treatment Diary     Manual            PROM L knee  ext 5 mins 5 mins 5 mins                                                                  Exercise Diary            Recumbent bike 10 mins 10 mins  10 mins          Quad sets 5"x10 5"x20 5"x20          Heel slides NP np np          gastroc towel stretch 30"x3 30"x3 30"x3          SLR flex 20x 2x10 2x10          SLR abd 20x 2x10 2x10          Adductor squeezes NP NP NP          Hamstring stretch 30x3 30"x3 30"x3          TM GT, heel toe - SL  NP np np          SAQ 5"x12 5"x20 5"x20          Mini Squats 20x 20x 20x          Step ups 6 in x20 6 in x20 6 in x20          SLR adduction 20x 2x10 2x10          Lateral step ups 6 in x20 6 in x20 6 in x20          Step downs 4 in x20 4 in x20 4 in x20          TKE GTB 5"x15 GTB 5"x15 GTB 5"x15          SL LP sidelying L16 x20 L16 x20 NP                                                     Modalities         CP post knee elevated 10 min 10 MIN 10 min np np np                                       Assessment: Tolerated treatment well  She remains challenged w/ step downs       Plan: d/c

## 2019-02-28 ENCOUNTER — EVALUATION (OUTPATIENT)
Dept: PHYSICAL THERAPY | Facility: MEDICAL CENTER | Age: 24
End: 2019-02-28
Payer: COMMERCIAL

## 2019-02-28 DIAGNOSIS — G89.29 CHRONIC PAIN OF LEFT KNEE: Primary | ICD-10-CM

## 2019-02-28 DIAGNOSIS — M25.562 CHRONIC PAIN OF LEFT KNEE: Primary | ICD-10-CM

## 2019-02-28 PROCEDURE — G8990 OTHER PT/OT CURRENT STATUS: HCPCS | Performed by: PHYSICAL THERAPIST

## 2019-02-28 PROCEDURE — 97161 PT EVAL LOW COMPLEX 20 MIN: CPT | Performed by: PHYSICAL THERAPIST

## 2019-02-28 PROCEDURE — 97110 THERAPEUTIC EXERCISES: CPT | Performed by: PHYSICAL THERAPIST

## 2019-02-28 PROCEDURE — G8991 OTHER PT/OT GOAL STATUS: HCPCS | Performed by: PHYSICAL THERAPIST

## 2019-02-28 NOTE — PROGRESS NOTES
PT Evaluation     Today's date: 2019  Patient name: Oneil Garrido  : 1995  MRN: [id number]  Referring provider: Peter Anthony PT  Dx:   Encounter Diagnosis     ICD-10-CM    1  Chronic pain of left knee M25 562     G89 29                   Assessment  Assessment details: Oneil Garrido is a 21 y o  female who presents with Chronic pain of left knee  (primary encounter diagnosis)  Patient presents alert and oriented with the above impairments  Jaciel Vega will benefit from PT to addres deficits in order to maximize and return to prior level of function  No further referral appears necessary at this time based upon examination results  Prognosis is good given HEP compliance  Please contact me if you have any questions or recommendations  Impairments: abnormal gait, abnormal or restricted ROM, activity intolerance, impaired physical strength, lacks appropriate home exercise program and pain with function  Understanding of Dx/Px/POC: excellent  Goals  Short Term Goals:   1  Pain decreased 2 ratings in 4 weeks  2  ROM increased 10* in 4 weeks  3  Strength increased 1/2 grade in 4 weeks    Long Term Goals:   1  ADLS/IADLS in related activities improved to maximal level in 8 weeks  2  Work performance improved to maximal level in 8 weeks  3  Recreational activities are improved to maximal level in 8 weeks  4   [place] with HEP in 8 weeks  Plan  Patient would benefit from: skilled physical therapy  Planned modality interventions: cryotherapy  Planned therapy interventions: functional ROM exercises, flexibility, therapeutic exercise, stretching, strengthening, patient education, neuromuscular re-education, manual therapy and home exercise program  Frequency: 2x week  Duration in weeks: 6  Treatment plan discussed with: patient        Subjective Evaluation    History of Present Illness  Mechanism of injury: Pt underwent L knee arthroscopic surgery for debridement on Dec 6    She attended PT from Dec 17 -   She presents back today w/ reports of ongoing stiffness  She does report HEP compliance upon d/c  Despite compliance, she c/o increased stiffness and sometimes pain first thing in the morning, at the end of the day, and w/ damp weather  She c/o intermittent buckling of knee which occurs every few weeks  She has remained independent w/ daily activity despite ongoing symptoms  Able to negotiate stairs reciprocally, but does c/o weakness  Unable to run  She also c/o sporadically ambulating w/ antalgic gait  She c/o moreso of stiffness than pain, but will experience pain w/ knee roseann    Pain  At best pain ratin  At worst pain ratin    Patient Goals  Patient goals for therapy: decreased pain, increased motion, increased strength, independence with ADLs/IADLs and return to sport/leisure activities          Objective     Active Range of Motion   Left Knee   Flexion: 145 degrees   Extension: -5 degrees     Right Knee   Flexion: 150 degrees   Extension: 0 degrees     Passive Range of Motion   Left Knee   Extension: 0 degrees     Strength/Myotome Testing     Left Knee   Prone flexion: 4+  Extension: 3-    Right Knee   Prone flexion: 5  Extension: 5    Ambulation     Comments   Lacks TKE at heel strike      Flowsheet Rows      Most Recent Value   PT/OT G-Codes   Current Score  60   Projected Score  73   FOTO information reviewed  Yes   Assessment Type  Evaluation   G code set  Other PT/OT Primary   Other PT Primary Current Status ()  CK   Other PT Primary Goal Status ()  CJ          Precautions: none    Daily Treatment Diary     Manual              PROM L knee ext nv                                                                    Exercise Diary              Recumbent bike [facility] 5"x20            gastroc towel stretch 30"x3            Hamstring stretch 30"x3            SLR x4 nv            LAQ nv            Step ups nv            Mini squat nv SLS nv                                                                                                                                                               Modalities

## 2019-03-05 ENCOUNTER — OFFICE VISIT (OUTPATIENT)
Dept: PHYSICAL THERAPY | Facility: MEDICAL CENTER | Age: 24
End: 2019-03-05
Payer: COMMERCIAL

## 2019-03-05 DIAGNOSIS — G89.29 CHRONIC PAIN OF LEFT KNEE: Primary | ICD-10-CM

## 2019-03-05 DIAGNOSIS — M25.562 CHRONIC PAIN OF LEFT KNEE: Primary | ICD-10-CM

## 2019-03-05 PROCEDURE — 97110 THERAPEUTIC EXERCISES: CPT | Performed by: PHYSICAL THERAPIST

## 2019-03-05 NOTE — PROGRESS NOTES
Daily Note     Today's date: 3/5/2019  Patient name: John John  : 1995  MRN: [id number]  Referring provider: Jyotsna Jim PT  Dx:   Encounter Diagnosis     ICD-10-CM    1  Chronic pain of left knee M25 562     G89 29                   Subjective: Pt reports not feeling too good today  C/o constant stiffness and increased pain w/ prolonged standing  Objective: See treatment diary below  Precautions none    Specialty Daily Treatment Diary     Manual  3/5       Passive L knee ext 5 min                                           Exercise Diary  3/5       Recumbent bike 10 min       Hamstring stretch 30"x3       gastroc towel stretch 30"x3       Quad sets 5"x20       SLR x4 x20 except  flex x10       LAQ 5"x10       Mini squats x10       Step ups nv       SLS nv                                                                                                   Modalities                                          Assessment: Tolerated treatment fair  Patient demonstrated fatigue post treatment and would benefit from continued PT  Pt c/o pain w/ SLR and LAQ  Extensor lag present w/ SLR    Plan: Continue per plan of care

## 2019-03-07 ENCOUNTER — APPOINTMENT (OUTPATIENT)
Dept: PHYSICAL THERAPY | Facility: MEDICAL CENTER | Age: 24
End: 2019-03-07
Payer: COMMERCIAL

## 2019-03-12 ENCOUNTER — APPOINTMENT (OUTPATIENT)
Dept: PHYSICAL THERAPY | Facility: MEDICAL CENTER | Age: 24
End: 2019-03-12
Payer: COMMERCIAL

## 2019-03-13 ENCOUNTER — APPOINTMENT (OUTPATIENT)
Dept: PHYSICAL THERAPY | Facility: MEDICAL CENTER | Age: 24
End: 2019-03-13
Payer: COMMERCIAL

## 2019-03-14 ENCOUNTER — OFFICE VISIT (OUTPATIENT)
Dept: PHYSICAL THERAPY | Facility: MEDICAL CENTER | Age: 24
End: 2019-03-14
Payer: COMMERCIAL

## 2019-03-14 DIAGNOSIS — G89.29 CHRONIC PAIN OF LEFT KNEE: Primary | ICD-10-CM

## 2019-03-14 DIAGNOSIS — M25.562 CHRONIC PAIN OF LEFT KNEE: Primary | ICD-10-CM

## 2019-03-14 PROCEDURE — 97110 THERAPEUTIC EXERCISES: CPT | Performed by: PHYSICAL THERAPIST

## 2019-03-14 NOTE — PROGRESS NOTES
Daily Note     Today's date: 3/14/2019  Patient name: Patrick Mann  : 1995  MRN: [id number]  Referring provider: John Nguyễn, PT  Dx:   Encounter Diagnosis     ICD-10-CM    1  Chronic pain of left knee M25 562     G89 29                   Subjective: Pt r eports increased pain since starting new job orientation the past week which did require prolonged standing and stair climbing  Pain is located at inferior aspect of patella      Objective: See treatment diary below  Precautions none    Specialty Daily Treatment Diary     Manual  3/5 3/14      Passive L knee ext 5 min 5 min                                          Exercise Diary  3/5 3/14      Recumbent bike 10 min 10 min      Hamstring stretch 30"x3 30"x5      gastroc towel stretch 30"x3 30"x5      Quad sets 5"x20 5"x20      SLR x4 x20 except  flex x10 x20 except x10 flex w/ assist      LAQ 5"x10 5"x10      Mini squats x10 np      Step ups nv np      SLS nv np                                                                                                  Modalities  3/14      CP post  10 min                                Assessment: Tolerated treatment fair  Patient demonstrated fatigue post treatment and would benefit from continued PT    Extensor lag persisting w/ SLR; also requires minimal assist   Held standing TE  Plan: Continue per plan of care

## 2019-03-18 ENCOUNTER — APPOINTMENT (OUTPATIENT)
Dept: PHYSICAL THERAPY | Facility: MEDICAL CENTER | Age: 24
End: 2019-03-18
Payer: COMMERCIAL

## 2019-03-20 ENCOUNTER — OFFICE VISIT (OUTPATIENT)
Dept: PHYSICAL THERAPY | Facility: MEDICAL CENTER | Age: 24
End: 2019-03-20
Payer: COMMERCIAL

## 2019-03-20 ENCOUNTER — APPOINTMENT (OUTPATIENT)
Dept: PHYSICAL THERAPY | Facility: MEDICAL CENTER | Age: 24
End: 2019-03-20
Payer: COMMERCIAL

## 2019-03-20 DIAGNOSIS — M25.562 CHRONIC PAIN OF LEFT KNEE: Primary | ICD-10-CM

## 2019-03-20 DIAGNOSIS — G89.29 CHRONIC PAIN OF LEFT KNEE: Primary | ICD-10-CM

## 2019-03-20 PROCEDURE — 97110 THERAPEUTIC EXERCISES: CPT

## 2019-03-20 NOTE — PROGRESS NOTES
Daily Note     Today's date: 3/20/2019  Patient name: Eloy Jimenez  : 1995  MRN: [id number]  Referring provider: Marcel Kirby, PT  Dx:   Encounter Diagnosis     ICD-10-CM    1  Chronic pain of left knee M25 562     G89 29                   Subjective: Pt reports continued pain in knee  She states she is still unable to get knee straight and feels she has not made any progress since returning to PT  Objective: See treatment diary below  Precautions none    Specialty Daily Treatment Diary     Manual  3/5 3/14 3/20     Passive L knee ext 5 min 5 min 5 min                                         Exercise Diary  3/5 3/14 3/20     Recumbent bike 10 min 10 min 10 min     Hamstring stretch 30"x3 30"x5 30"x5     gastroc towel stretch 30"x3 30"x5 30"x5     Quad sets 5"x20 5"x20 5"x20     SLR x4 x20 except  flex x10 x20 except x10 flex w/ assist 20x except 10x flex w/ assist     LAQ 5"x10 5"x10 5"x10     Mini squats x10 np np     Step ups nv np np     SLS nv np np                                                                                                 Modalities  3/14 3/20     CP post  10 min np                               Assessment: Tolerated treatment fair  Patient demonstrated fatigue post treatment and would benefit from continued PT    Pt continues to complain of sig pain inferior to patella w/ SLR's as well as having an extensor lag  She has palpable tenderness and swelling in posterior knee  Advised to call MD regarding this persistent swelling in post knee since she has hx of needing the swelling drained post operatively  Attempted Best tape today w/ medial glide to help w/ patellar stabilization w/ painful SLR's w/ some improvement noted  Plan: Continue per plan of care

## 2019-03-21 ENCOUNTER — APPOINTMENT (OUTPATIENT)
Dept: PHYSICAL THERAPY | Facility: MEDICAL CENTER | Age: 24
End: 2019-03-21
Payer: COMMERCIAL

## 2019-03-22 ENCOUNTER — OFFICE VISIT (OUTPATIENT)
Dept: OBGYN CLINIC | Facility: MEDICAL CENTER | Age: 24
End: 2019-03-22
Payer: COMMERCIAL

## 2019-03-22 VITALS
HEART RATE: 70 BPM | DIASTOLIC BLOOD PRESSURE: 81 MMHG | SYSTOLIC BLOOD PRESSURE: 115 MMHG | WEIGHT: 140 LBS | BODY MASS INDEX: 21.22 KG/M2 | HEIGHT: 68 IN

## 2019-03-22 DIAGNOSIS — Z98.890 STATUS POST ARTHROSCOPY OF LEFT KNEE: Primary | ICD-10-CM

## 2019-03-22 DIAGNOSIS — G89.29 CHRONIC PAIN OF LEFT KNEE: ICD-10-CM

## 2019-03-22 DIAGNOSIS — M25.562 CHRONIC PAIN OF LEFT KNEE: ICD-10-CM

## 2019-03-22 PROCEDURE — 20610 DRAIN/INJ JOINT/BURSA W/O US: CPT | Performed by: ORTHOPAEDIC SURGERY

## 2019-03-22 PROCEDURE — 99213 OFFICE O/P EST LOW 20 MIN: CPT | Performed by: ORTHOPAEDIC SURGERY

## 2019-03-22 RX ORDER — LIDOCAINE HYDROCHLORIDE 10 MG/ML
2 INJECTION, SOLUTION INFILTRATION; PERINEURAL
Status: COMPLETED | OUTPATIENT
Start: 2019-03-22 | End: 2019-03-22

## 2019-03-22 RX ORDER — SULFAMETHOXAZOLE AND TRIMETHOPRIM 800; 160 MG/1; MG/1
1 TABLET ORAL 2 TIMES DAILY
Refills: 0 | COMMUNITY
Start: 2019-03-19 | End: 2019-05-03

## 2019-03-22 RX ORDER — BETAMETHASONE SODIUM PHOSPHATE AND BETAMETHASONE ACETATE 3; 3 MG/ML; MG/ML
12 INJECTION, SUSPENSION INTRA-ARTICULAR; INTRALESIONAL; INTRAMUSCULAR; SOFT TISSUE
Status: COMPLETED | OUTPATIENT
Start: 2019-03-22 | End: 2019-03-22

## 2019-03-22 RX ADMIN — BETAMETHASONE SODIUM PHOSPHATE AND BETAMETHASONE ACETATE 12 MG: 3; 3 INJECTION, SUSPENSION INTRA-ARTICULAR; INTRALESIONAL; INTRAMUSCULAR; SOFT TISSUE at 11:15

## 2019-03-22 RX ADMIN — LIDOCAINE HYDROCHLORIDE 2 ML: 10 INJECTION, SOLUTION INFILTRATION; PERINEURAL at 11:15

## 2019-03-22 NOTE — PROGRESS NOTES
Assessment:  1  Status post arthroscopy of left knee     2  Chronic pain of left knee         Plan:  The patient presents 3 months status post left knee arthroplasty, synovectomy, patellofemoral chondroplasty  She has been flared for the past month with increased inferior and lateral knee pain  The patient has no effusion with good range of motion  She is provided with left knee steroid injection  She shoul continue physical therapy after 7-10 days  She should follow up in 6 weeks  To do next visit:  No follow-ups on file  The above stated was discussed in layman's terms and the patient expressed understanding  All questions were answered to the patient's satisfaction  Scribe Attestation    I,:   Sandra Yates am acting as a scribe while in the presence of the attending physician :        I,:   Miladis Espitia MD personally performed the services described in this documentation    as scribed in my presence :              Subjective:   Maurilio Ross is a 21 y o  female who presents 3 months status post left knee arthroplasty, synovectomy, patellofemoral chondroplasty, 12/6/18  She has been having some difficulties for about a month  She did return to work full time  Today she complains of inferior and lateral left knee pain  She rates her pain at 0/10 and 10/10 at its worse  She can have left posterior knee swelling  Tightening quadriceps aggravates  Sitting alleviates  She can feel the inferior knee buckle  She does use tylenol for pain  She will use ice with some benefit  She is currently in physical therapy with slow progress          Review of systems negative unless otherwise specified in HPI    Past Medical History:   Diagnosis Date    Asthma        Past Surgical History:   Procedure Laterality Date    GANGLION CYST EXCISION      KNEE ARTHROSCOPY W/ MENISCAL REPAIR      SC KNEE SCOPE,SINGLE MENISECTOMY Left 12/6/2018    Procedure: KNEE ARTHROSCOPY; SYNOVECTOMY; PATELLOFEMORAL CHONDROPLASTY;  Surgeon: Garrett Carpenter MD;  Location: [facility];  Service: Orthopedics    SHOULDER SURGERY      TONSILLECTOMY         Family History   Problem Relation Age of Onset    Hypothyroidism Mother     No Known Problems Father     Breast cancer Paternal Grandmother     No Known Problems Maternal Grandmother     No Known Problems Maternal Grandfather        Social History     Occupational History    Not on file   Tobacco Use    Smoking status: Never Smoker    Smokeless tobacco: Never Used   Substance and Sexual Activity    Alcohol use: No    Drug use: No    Sexual activity: Yes     Partners: Male     Birth control/protection: OCP         Current Outpatient Medications:     albuterol (PROAIR HFA) 90 mcg/act inhaler, ProAir HFA 90 mcg/actuation aerosol inhaler, Disp: , Rfl:     Norethin-Eth Estrad-Fe Biphas (LO LOESTRIN FE) 1 MG-10 MCG / 10 MCG TABS, Take 1 tablet by mouth daily, Disp: 90 tablet, Rfl: 3    sulfamethoxazole-trimethoprim (BACTRIM DS) 800-160 mg per tablet, Take 1 tablet by mouth 2 (two) times a day, Disp: , Rfl: 0    No Known Allergies         Vitals:    03/22/19 1048   BP: 115/81   Pulse: 70       Objective:  Physical exam  · General: Awake, Alert, Oriented  · Eyes: Pupils equal, round and reactive to light  · Heart: regular rate and rhythm  · Lungs: No audible wheezing  · Abdomen: soft                    Ortho Exam   Left knee:  No effusion  Mild swelling  No warmth  No cystic mass of posterior knee  Extensor mechanism intact  Full ROM with guarding       Diagnostics, reviewed and taken today if performed as documented:    None performed      The attending physician has personally reviewed the pertinent films in PACS and interpretation is as follows:      Procedures, if performed today:    Large joint arthrocentesis: L knee  Date/Time: 3/22/2019 11:15 AM  Consent given by: patient  Site marked: site marked  Supporting Documentation  Indications: pain   Procedure Details  Location: knee - L knee  Preparation: Patient was prepped and draped in the usual sterile fashion  Needle size: 22 G  Ultrasound guidance: no  Approach: anterolateral  Medications administered: 12 mg betamethasone acetate-betamethasone sodium phosphate 6 (3-3) mg/mL; 2 mL lidocaine 1 % (2 ml bupivacaine 0 5% injection)    Patient tolerance: patient tolerated the procedure well with no immediate complications  Dressing:  Sterile dressing applied          Portions of the record may have been created with voice recognition software   Occasional wrong word or "sound a like" substitutions may have occurred due to the inherent limitations of voice recognition software   Read the chart carefully and recognize, using context, where substitutions have occurred

## 2019-03-25 ENCOUNTER — APPOINTMENT (OUTPATIENT)
Dept: PHYSICAL THERAPY | Facility: MEDICAL CENTER | Age: 24
End: 2019-03-25
Payer: COMMERCIAL

## 2019-03-28 ENCOUNTER — APPOINTMENT (OUTPATIENT)
Dept: PHYSICAL THERAPY | Facility: MEDICAL CENTER | Age: 24
End: 2019-03-28
Payer: COMMERCIAL

## 2019-04-01 ENCOUNTER — APPOINTMENT (OUTPATIENT)
Dept: PHYSICAL THERAPY | Facility: MEDICAL CENTER | Age: 24
End: 2019-04-01
Payer: COMMERCIAL

## 2019-04-08 ENCOUNTER — EVALUATION (OUTPATIENT)
Dept: PHYSICAL THERAPY | Facility: MEDICAL CENTER | Age: 24
End: 2019-04-08
Payer: COMMERCIAL

## 2019-04-08 DIAGNOSIS — G89.29 CHRONIC PAIN OF LEFT KNEE: Primary | ICD-10-CM

## 2019-04-08 DIAGNOSIS — M25.562 CHRONIC PAIN OF LEFT KNEE: Primary | ICD-10-CM

## 2019-04-08 PROCEDURE — G8990 OTHER PT/OT CURRENT STATUS: HCPCS | Performed by: PHYSICAL THERAPIST

## 2019-04-08 PROCEDURE — G8991 OTHER PT/OT GOAL STATUS: HCPCS | Performed by: PHYSICAL THERAPIST

## 2019-04-08 PROCEDURE — 97110 THERAPEUTIC EXERCISES: CPT | Performed by: PHYSICAL THERAPIST

## 2019-04-15 ENCOUNTER — APPOINTMENT (OUTPATIENT)
Dept: PHYSICAL THERAPY | Facility: MEDICAL CENTER | Age: 24
End: 2019-04-15
Payer: COMMERCIAL

## 2019-04-15 ENCOUNTER — OFFICE VISIT (OUTPATIENT)
Dept: PHYSICAL THERAPY | Facility: MEDICAL CENTER | Age: 24
End: 2019-04-15
Payer: COMMERCIAL

## 2019-04-15 ENCOUNTER — TRANSCRIBE ORDERS (OUTPATIENT)
Dept: ADMINISTRATIVE | Facility: HOSPITAL | Age: 24
End: 2019-04-15

## 2019-04-15 DIAGNOSIS — M25.562 CHRONIC PAIN OF LEFT KNEE: Primary | ICD-10-CM

## 2019-04-15 DIAGNOSIS — R31.9 HEMATURIA, UNSPECIFIED TYPE: Primary | ICD-10-CM

## 2019-04-15 DIAGNOSIS — G89.29 CHRONIC PAIN OF LEFT KNEE: Primary | ICD-10-CM

## 2019-04-15 PROCEDURE — 97110 THERAPEUTIC EXERCISES: CPT

## 2019-04-16 ENCOUNTER — HOSPITAL ENCOUNTER (OUTPATIENT)
Dept: CT IMAGING | Facility: HOSPITAL | Age: 24
Discharge: HOME | End: 2019-04-16
Payer: COMMERCIAL

## 2019-04-16 DIAGNOSIS — R31.9 HEMATURIA, UNSPECIFIED TYPE: ICD-10-CM

## 2019-04-16 PROCEDURE — 74176 CT ABD & PELVIS W/O CONTRAST: CPT

## 2019-04-18 ENCOUNTER — APPOINTMENT (OUTPATIENT)
Dept: PHYSICAL THERAPY | Facility: MEDICAL CENTER | Age: 24
End: 2019-04-18
Payer: COMMERCIAL

## 2019-04-22 ENCOUNTER — OFFICE VISIT (OUTPATIENT)
Dept: PHYSICAL THERAPY | Facility: MEDICAL CENTER | Age: 24
End: 2019-04-22
Payer: COMMERCIAL

## 2019-04-22 DIAGNOSIS — M25.562 CHRONIC PAIN OF LEFT KNEE: Primary | ICD-10-CM

## 2019-04-22 DIAGNOSIS — G89.29 CHRONIC PAIN OF LEFT KNEE: Primary | ICD-10-CM

## 2019-04-22 PROCEDURE — 97112 NEUROMUSCULAR REEDUCATION: CPT

## 2019-04-22 PROCEDURE — 97110 THERAPEUTIC EXERCISES: CPT

## 2019-04-22 PROCEDURE — 97140 MANUAL THERAPY 1/> REGIONS: CPT

## 2019-04-29 ENCOUNTER — OFFICE VISIT (OUTPATIENT)
Dept: PHYSICAL THERAPY | Facility: MEDICAL CENTER | Age: 24
End: 2019-04-29
Payer: COMMERCIAL

## 2019-04-29 DIAGNOSIS — G89.29 CHRONIC PAIN OF LEFT KNEE: Primary | ICD-10-CM

## 2019-04-29 DIAGNOSIS — M25.562 CHRONIC PAIN OF LEFT KNEE: Primary | ICD-10-CM

## 2019-04-29 PROCEDURE — 97140 MANUAL THERAPY 1/> REGIONS: CPT

## 2019-04-29 PROCEDURE — 97110 THERAPEUTIC EXERCISES: CPT

## 2019-04-29 PROCEDURE — 97112 NEUROMUSCULAR REEDUCATION: CPT

## 2019-05-03 ENCOUNTER — OFFICE VISIT (OUTPATIENT)
Dept: OBGYN CLINIC | Facility: MEDICAL CENTER | Age: 24
End: 2019-05-03
Payer: COMMERCIAL

## 2019-05-03 VITALS
HEART RATE: 67 BPM | DIASTOLIC BLOOD PRESSURE: 62 MMHG | SYSTOLIC BLOOD PRESSURE: 97 MMHG | WEIGHT: 140.6 LBS | RESPIRATION RATE: 18 BRPM | BODY MASS INDEX: 21.31 KG/M2 | HEIGHT: 68 IN

## 2019-05-03 DIAGNOSIS — M25.562 CHRONIC PAIN OF LEFT KNEE: ICD-10-CM

## 2019-05-03 DIAGNOSIS — Z98.890 STATUS POST ARTHROSCOPY OF LEFT KNEE: Primary | ICD-10-CM

## 2019-05-03 DIAGNOSIS — G89.29 CHRONIC PAIN OF LEFT KNEE: ICD-10-CM

## 2019-05-03 PROCEDURE — 99212 OFFICE O/P EST SF 10 MIN: CPT | Performed by: ORTHOPAEDIC SURGERY

## 2019-05-03 RX ORDER — IBUPROFEN 600 MG/1
600 TABLET ORAL
COMMUNITY
End: 2019-12-12

## 2019-05-06 ENCOUNTER — OFFICE VISIT (OUTPATIENT)
Dept: PHYSICAL THERAPY | Facility: MEDICAL CENTER | Age: 24
End: 2019-05-06
Payer: COMMERCIAL

## 2019-05-06 DIAGNOSIS — G89.29 CHRONIC PAIN OF LEFT KNEE: Primary | ICD-10-CM

## 2019-05-06 DIAGNOSIS — M25.562 CHRONIC PAIN OF LEFT KNEE: Primary | ICD-10-CM

## 2019-05-06 PROCEDURE — 97110 THERAPEUTIC EXERCISES: CPT

## 2019-05-06 PROCEDURE — 97140 MANUAL THERAPY 1/> REGIONS: CPT

## 2019-05-06 PROCEDURE — 97112 NEUROMUSCULAR REEDUCATION: CPT

## 2019-05-13 ENCOUNTER — OFFICE VISIT (OUTPATIENT)
Dept: PHYSICAL THERAPY | Facility: MEDICAL CENTER | Age: 24
End: 2019-05-13
Payer: COMMERCIAL

## 2019-05-13 DIAGNOSIS — G89.29 CHRONIC PAIN OF LEFT KNEE: Primary | ICD-10-CM

## 2019-05-13 DIAGNOSIS — M25.562 CHRONIC PAIN OF LEFT KNEE: Primary | ICD-10-CM

## 2019-05-13 PROCEDURE — 97110 THERAPEUTIC EXERCISES: CPT | Performed by: PHYSICAL THERAPIST

## 2019-05-13 PROCEDURE — 97112 NEUROMUSCULAR REEDUCATION: CPT | Performed by: PHYSICAL THERAPIST

## 2019-05-16 ENCOUNTER — APPOINTMENT (OUTPATIENT)
Dept: PHYSICAL THERAPY | Facility: MEDICAL CENTER | Age: 24
End: 2019-05-16
Payer: COMMERCIAL

## 2019-05-20 ENCOUNTER — EVALUATION (OUTPATIENT)
Dept: PHYSICAL THERAPY | Facility: MEDICAL CENTER | Age: 24
End: 2019-05-20
Payer: COMMERCIAL

## 2019-05-20 DIAGNOSIS — G89.29 CHRONIC PAIN OF LEFT KNEE: Primary | ICD-10-CM

## 2019-05-20 DIAGNOSIS — M25.562 CHRONIC PAIN OF LEFT KNEE: Primary | ICD-10-CM

## 2019-05-20 PROCEDURE — G8990 OTHER PT/OT CURRENT STATUS: HCPCS | Performed by: PHYSICAL THERAPIST

## 2019-05-20 PROCEDURE — 97112 NEUROMUSCULAR REEDUCATION: CPT | Performed by: PHYSICAL THERAPIST

## 2019-05-20 PROCEDURE — 97140 MANUAL THERAPY 1/> REGIONS: CPT | Performed by: PHYSICAL THERAPIST

## 2019-05-20 PROCEDURE — 97110 THERAPEUTIC EXERCISES: CPT | Performed by: PHYSICAL THERAPIST

## 2019-05-20 PROCEDURE — G8991 OTHER PT/OT GOAL STATUS: HCPCS | Performed by: PHYSICAL THERAPIST

## 2019-05-23 ENCOUNTER — APPOINTMENT (OUTPATIENT)
Dept: PHYSICAL THERAPY | Facility: MEDICAL CENTER | Age: 24
End: 2019-05-23
Payer: COMMERCIAL

## 2019-05-29 ENCOUNTER — APPOINTMENT (OUTPATIENT)
Dept: PHYSICAL THERAPY | Facility: MEDICAL CENTER | Age: 24
End: 2019-05-29
Payer: COMMERCIAL

## 2019-05-30 ENCOUNTER — APPOINTMENT (OUTPATIENT)
Dept: PHYSICAL THERAPY | Facility: MEDICAL CENTER | Age: 24
End: 2019-05-30
Payer: COMMERCIAL

## 2019-06-03 ENCOUNTER — OFFICE VISIT (OUTPATIENT)
Dept: PHYSICAL THERAPY | Facility: MEDICAL CENTER | Age: 24
End: 2019-06-03
Payer: COMMERCIAL

## 2019-06-03 DIAGNOSIS — G89.29 CHRONIC PAIN OF LEFT KNEE: Primary | ICD-10-CM

## 2019-06-03 DIAGNOSIS — M25.562 CHRONIC PAIN OF LEFT KNEE: Primary | ICD-10-CM

## 2019-06-03 PROCEDURE — 97140 MANUAL THERAPY 1/> REGIONS: CPT | Performed by: PHYSICAL THERAPIST

## 2019-06-03 PROCEDURE — 97110 THERAPEUTIC EXERCISES: CPT | Performed by: PHYSICAL THERAPIST

## 2019-06-12 ENCOUNTER — OFFICE VISIT (OUTPATIENT)
Dept: OBGYN CLINIC | Facility: MEDICAL CENTER | Age: 24
End: 2019-06-12
Payer: COMMERCIAL

## 2019-06-12 VITALS — SYSTOLIC BLOOD PRESSURE: 124 MMHG | WEIGHT: 141 LBS | DIASTOLIC BLOOD PRESSURE: 60 MMHG | BODY MASS INDEX: 21.44 KG/M2

## 2019-06-12 DIAGNOSIS — R10.2 PELVIC PAIN: Primary | ICD-10-CM

## 2019-06-12 DIAGNOSIS — B96.89 BV (BACTERIAL VAGINOSIS): ICD-10-CM

## 2019-06-12 DIAGNOSIS — N76.0 BV (BACTERIAL VAGINOSIS): ICD-10-CM

## 2019-06-12 DIAGNOSIS — Z30.41 ENCOUNTER FOR SURVEILLANCE OF CONTRACEPTIVE PILLS: ICD-10-CM

## 2019-06-12 DIAGNOSIS — Z30.41 ORAL CONTRACEPTIVE PILL SURVEILLANCE: ICD-10-CM

## 2019-06-12 PROCEDURE — 87491 CHLMYD TRACH DNA AMP PROBE: CPT | Performed by: NURSE PRACTITIONER

## 2019-06-12 PROCEDURE — 99213 OFFICE O/P EST LOW 20 MIN: CPT | Performed by: NURSE PRACTITIONER

## 2019-06-12 PROCEDURE — 87591 N.GONORRHOEAE DNA AMP PROB: CPT | Performed by: NURSE PRACTITIONER

## 2019-06-12 RX ORDER — CLINDAMYCIN HYDROCHLORIDE 300 MG/1
300 CAPSULE ORAL 2 TIMES DAILY
Qty: 14 CAPSULE | Refills: 0 | Status: SHIPPED | OUTPATIENT
Start: 2019-06-12 | End: 2019-06-12 | Stop reason: SDUPTHER

## 2019-06-12 RX ORDER — CLINDAMYCIN HYDROCHLORIDE 300 MG/1
300 CAPSULE ORAL 2 TIMES DAILY
Qty: 14 CAPSULE | Refills: 0 | Status: SHIPPED | OUTPATIENT
Start: 2019-06-12 | End: 2019-06-19

## 2019-06-13 LAB
C TRACH DNA SPEC QL NAA+PROBE: NEGATIVE
N GONORRHOEA DNA SPEC QL NAA+PROBE: NEGATIVE

## 2019-06-14 ENCOUNTER — TELEPHONE (OUTPATIENT)
Dept: OBGYN CLINIC | Facility: CLINIC | Age: 24
End: 2019-06-14

## 2019-06-14 ENCOUNTER — OFFICE VISIT (OUTPATIENT)
Dept: PHYSICAL THERAPY | Facility: MEDICAL CENTER | Age: 24
End: 2019-06-14
Payer: COMMERCIAL

## 2019-06-14 DIAGNOSIS — G89.29 CHRONIC PAIN OF LEFT KNEE: Primary | ICD-10-CM

## 2019-06-14 DIAGNOSIS — M25.562 CHRONIC PAIN OF LEFT KNEE: Primary | ICD-10-CM

## 2019-06-14 PROCEDURE — 97140 MANUAL THERAPY 1/> REGIONS: CPT | Performed by: PHYSICAL THERAPIST

## 2019-06-14 PROCEDURE — 97112 NEUROMUSCULAR REEDUCATION: CPT | Performed by: PHYSICAL THERAPIST

## 2019-06-14 PROCEDURE — 97110 THERAPEUTIC EXERCISES: CPT | Performed by: PHYSICAL THERAPIST

## 2019-06-17 ENCOUNTER — APPOINTMENT (OUTPATIENT)
Dept: PHYSICAL THERAPY | Facility: MEDICAL CENTER | Age: 24
End: 2019-06-17
Payer: COMMERCIAL

## 2019-06-19 ENCOUNTER — APPOINTMENT (OUTPATIENT)
Dept: PHYSICAL THERAPY | Facility: MEDICAL CENTER | Age: 24
End: 2019-06-19
Payer: COMMERCIAL

## 2019-06-19 ENCOUNTER — HOSPITAL ENCOUNTER (OUTPATIENT)
Dept: RADIOLOGY | Facility: MEDICAL CENTER | Age: 24
Discharge: HOME | End: 2019-06-19
Payer: COMMERCIAL

## 2019-06-19 DIAGNOSIS — R10.2 PELVIC PAIN: ICD-10-CM

## 2019-06-19 PROCEDURE — 76830 TRANSVAGINAL US NON-OB: CPT

## 2019-06-19 PROCEDURE — 76856 US EXAM PELVIC COMPLETE: CPT

## 2019-06-24 ENCOUNTER — OFFICE VISIT (OUTPATIENT)
Dept: PHYSICAL THERAPY | Facility: MEDICAL CENTER | Age: 24
End: 2019-06-24
Payer: COMMERCIAL

## 2019-06-24 DIAGNOSIS — G89.29 CHRONIC PAIN OF LEFT KNEE: Primary | ICD-10-CM

## 2019-06-24 DIAGNOSIS — M25.562 CHRONIC PAIN OF LEFT KNEE: Primary | ICD-10-CM

## 2019-06-24 PROCEDURE — 97110 THERAPEUTIC EXERCISES: CPT

## 2019-06-24 PROCEDURE — 97140 MANUAL THERAPY 1/> REGIONS: CPT

## 2019-06-25 ENCOUNTER — TELEPHONE (OUTPATIENT)
Dept: OBGYN CLINIC | Facility: CLINIC | Age: 24
End: 2019-06-25

## 2019-07-08 ENCOUNTER — OFFICE VISIT (OUTPATIENT)
Dept: PHYSICAL THERAPY | Facility: MEDICAL CENTER | Age: 24
End: 2019-07-08
Payer: COMMERCIAL

## 2019-07-08 DIAGNOSIS — G89.29 CHRONIC PAIN OF LEFT KNEE: Primary | ICD-10-CM

## 2019-07-08 DIAGNOSIS — M25.562 CHRONIC PAIN OF LEFT KNEE: Primary | ICD-10-CM

## 2019-07-08 PROCEDURE — 97110 THERAPEUTIC EXERCISES: CPT | Performed by: PHYSICAL THERAPIST

## 2019-07-08 PROCEDURE — G8991 OTHER PT/OT GOAL STATUS: HCPCS | Performed by: PHYSICAL THERAPIST

## 2019-07-08 PROCEDURE — 97112 NEUROMUSCULAR REEDUCATION: CPT | Performed by: PHYSICAL THERAPIST

## 2019-07-08 PROCEDURE — G8992 OTHER PT/OT  D/C STATUS: HCPCS | Performed by: PHYSICAL THERAPIST

## 2019-07-08 PROCEDURE — 97140 MANUAL THERAPY 1/> REGIONS: CPT | Performed by: PHYSICAL THERAPIST

## 2019-07-08 NOTE — PROGRESS NOTES
Daily Note and Discharge    Today's date: 2019  Patient name: Elsa Baez  : 1995  MRN: [id number]  Referring provider: Henrique Gillis PT  Dx:   Encounter Diagnosis     ICD-10-CM    1  Chronic pain of left knee M25 562     G89 29                   Subjective: Pt reports she was able to run without pain  Notes she is able to do everything on her own and is ready for D/C  Objective: See treatment diary below  Precautions none    Specialty Daily Treatment Diary     Manual  6 3 6//14 6/24 7/8    graston distal hamstring/post knee L  10 min 10 min 10 min 10'                                        Exercise Diary  6/3 6/14 6/24 7/8    elliptical 5 min 8  min  8 min 8'    SLR x4 x20 ea x20  x 20 ea 20x ea    Gastroc strap stretch 30"x3 30"x3 30" x 3 30" 3x    Hamstring strap stretch 30"x3 30"x3 30" x 3 30" 3x    Mini squats x20 x20  x 20 20x    Step ups 6in x20 6in x20 6 in x 20 6" 20x    Lateral step downs 4in x20 4in x20 6 in x 20 6" 20x    SLS foam 30"x3 30"x3 30" x 3 30" 3x    lunges x10 ea X10 EA X 10 ea x10 ea    TG LP single leg L16 x20 L16 x20  L16 x 20 x 2 L16 20x2    HR on foam   20 x 2 20x2                                                                                Modalities                                          Assessment: Tolerated treatment well  Patient exhibited good technique with therapeutic exercises and D/C to [facility]          Plan: D/C to [facility]

## 2019-08-06 ENCOUNTER — APPOINTMENT (OUTPATIENT)
Dept: LAB | Facility: MEDICAL CENTER | Age: 24
End: 2019-08-06
Payer: COMMERCIAL

## 2019-08-06 ENCOUNTER — TRANSCRIBE ORDERS (OUTPATIENT)
Dept: ADMINISTRATIVE | Facility: HOSPITAL | Age: 24
End: 2019-08-06

## 2019-08-06 DIAGNOSIS — I77.6 ARTERITIS, UNSPECIFIED (HCC): ICD-10-CM

## 2019-08-06 DIAGNOSIS — R53.83 FATIGUE, UNSPECIFIED TYPE: ICD-10-CM

## 2019-08-06 DIAGNOSIS — R53.83 FATIGUE, UNSPECIFIED TYPE: Primary | ICD-10-CM

## 2019-08-06 DIAGNOSIS — I77.6 ARTERITIS, UNSPECIFIED (HCC): Primary | ICD-10-CM

## 2019-08-06 LAB
ALBUMIN SERPL BCP-MCNC: 4 G/DL (ref 3.5–5)
ALP SERPL-CCNC: 93 U/L (ref 46–116)
ALT SERPL W P-5'-P-CCNC: 32 U/L (ref 12–78)
ANION GAP SERPL CALCULATED.3IONS-SCNC: 5 MMOL/L (ref 4–13)
APAP SERPL-MCNC: <2 UG/ML (ref 10–20)
AST SERPL W P-5'-P-CCNC: 25 U/L (ref 5–45)
BASOPHILS # BLD AUTO: 0.01 THOUSANDS/ΜL (ref 0–0.1)
BASOPHILS NFR BLD AUTO: 0 % (ref 0–1)
BILIRUB SERPL-MCNC: 0.5 MG/DL (ref 0.2–1)
BUN SERPL-MCNC: 6 MG/DL (ref 5–25)
CALCIUM SERPL-MCNC: 9.1 MG/DL (ref 8.3–10.1)
CHLORIDE SERPL-SCNC: 106 MMOL/L (ref 100–108)
CHOLEST SERPL-MCNC: 211 MG/DL (ref 50–200)
CO2 SERPL-SCNC: 26 MMOL/L (ref 21–32)
CREAT SERPL-MCNC: 0.8 MG/DL (ref 0.6–1.3)
EOSINOPHIL # BLD AUTO: 0.16 THOUSAND/ΜL (ref 0–0.61)
EOSINOPHIL NFR BLD AUTO: 3 % (ref 0–6)
ERYTHROCYTE [DISTWIDTH] IN BLOOD BY AUTOMATED COUNT: 12.6 % (ref 11.6–15.1)
ERYTHROCYTE [SEDIMENTATION RATE] IN BLOOD: 5 MM/HOUR (ref 0–20)
FOLATE SERPL-MCNC: 14.9 NG/ML (ref 3.1–17.5)
GFR SERPL CREATININE-BSD FRML MDRD: 104 ML/MIN/1.73SQ M
GLUCOSE P FAST SERPL-MCNC: 74 MG/DL (ref 65–99)
HCT VFR BLD AUTO: 45.6 % (ref 34.8–46.1)
HDLC SERPL-MCNC: 40 MG/DL (ref 40–60)
HGB BLD-MCNC: 14.4 G/DL (ref 11.5–15.4)
IMM GRANULOCYTES # BLD AUTO: 0.01 THOUSAND/UL (ref 0–0.2)
IMM GRANULOCYTES NFR BLD AUTO: 0 % (ref 0–2)
IRON SERPL-MCNC: 165 UG/DL (ref 50–170)
LDLC SERPL CALC-MCNC: 142 MG/DL (ref 0–100)
LYMPHOCYTES # BLD AUTO: 2.33 THOUSANDS/ΜL (ref 0.6–4.47)
LYMPHOCYTES NFR BLD AUTO: 43 % (ref 14–44)
MCH RBC QN AUTO: 29.1 PG (ref 26.8–34.3)
MCHC RBC AUTO-ENTMCNC: 31.6 G/DL (ref 31.4–37.4)
MCV RBC AUTO: 92 FL (ref 82–98)
MONOCYTES # BLD AUTO: 0.35 THOUSAND/ΜL (ref 0.17–1.22)
MONOCYTES NFR BLD AUTO: 7 % (ref 4–12)
NEUTROPHILS # BLD AUTO: 2.56 THOUSANDS/ΜL (ref 1.85–7.62)
NEUTS SEG NFR BLD AUTO: 47 % (ref 43–75)
NONHDLC SERPL-MCNC: 171 MG/DL
NRBC BLD AUTO-RTO: 0 /100 WBCS
PLATELET # BLD AUTO: 371 THOUSANDS/UL (ref 149–390)
PMV BLD AUTO: 11.2 FL (ref 8.9–12.7)
POTASSIUM SERPL-SCNC: 4 MMOL/L (ref 3.5–5.3)
PROT SERPL-MCNC: 8 G/DL (ref 6.4–8.2)
RBC # BLD AUTO: 4.94 MILLION/UL (ref 3.81–5.12)
SODIUM SERPL-SCNC: 137 MMOL/L (ref 136–145)
TRIGL SERPL-MCNC: 143 MG/DL
TSH SERPL DL<=0.05 MIU/L-ACNC: 4.11 UIU/ML (ref 0.36–3.74)
VIT B12 SERPL-MCNC: 172 PG/ML (ref 100–900)
WBC # BLD AUTO: 5.42 THOUSAND/UL (ref 4.31–10.16)

## 2019-08-06 PROCEDURE — 80053 COMPREHEN METABOLIC PANEL: CPT | Performed by: FAMILY MEDICINE

## 2019-08-06 PROCEDURE — 36415 COLL VENOUS BLD VENIPUNCTURE: CPT | Performed by: FAMILY MEDICINE

## 2019-08-06 PROCEDURE — 86618 LYME DISEASE ANTIBODY: CPT | Performed by: FAMILY MEDICINE

## 2019-08-06 PROCEDURE — 82746 ASSAY OF FOLIC ACID SERUM: CPT

## 2019-08-06 PROCEDURE — 85025 COMPLETE CBC W/AUTO DIFF WBC: CPT | Performed by: FAMILY MEDICINE

## 2019-08-06 PROCEDURE — 80061 LIPID PANEL: CPT | Performed by: FAMILY MEDICINE

## 2019-08-06 PROCEDURE — 86038 ANTINUCLEAR ANTIBODIES: CPT | Performed by: FAMILY MEDICINE

## 2019-08-06 PROCEDURE — 83540 ASSAY OF IRON: CPT | Performed by: FAMILY MEDICINE

## 2019-08-06 PROCEDURE — 86430 RHEUMATOID FACTOR TEST QUAL: CPT

## 2019-08-06 PROCEDURE — 82607 VITAMIN B-12: CPT | Performed by: FAMILY MEDICINE

## 2019-08-06 PROCEDURE — 85652 RBC SED RATE AUTOMATED: CPT | Performed by: FAMILY MEDICINE

## 2019-08-06 PROCEDURE — 80329 ANALGESICS NON-OPIOID 1 OR 2: CPT

## 2019-08-06 PROCEDURE — 84443 ASSAY THYROID STIM HORMONE: CPT | Performed by: FAMILY MEDICINE

## 2019-08-07 LAB
RHEUMATOID FACT SER QL LA: NEGATIVE
RYE IGE QN: NEGATIVE

## 2019-08-08 DIAGNOSIS — Z30.41 ENCOUNTER FOR SURVEILLANCE OF CONTRACEPTIVE PILLS: ICD-10-CM

## 2019-08-08 LAB
B BURGDOR IGG SER IA-ACNC: 0.23
B BURGDOR IGM SER IA-ACNC: 0.18

## 2019-09-04 ENCOUNTER — ANNUAL EXAM (OUTPATIENT)
Dept: OBGYN CLINIC | Facility: MEDICAL CENTER | Age: 24
End: 2019-09-04
Payer: COMMERCIAL

## 2019-09-04 VITALS — DIASTOLIC BLOOD PRESSURE: 70 MMHG | SYSTOLIC BLOOD PRESSURE: 110 MMHG | WEIGHT: 137 LBS | BODY MASS INDEX: 20.83 KG/M2

## 2019-09-04 DIAGNOSIS — N76.0 BV (BACTERIAL VAGINOSIS): ICD-10-CM

## 2019-09-04 DIAGNOSIS — B96.89 BV (BACTERIAL VAGINOSIS): ICD-10-CM

## 2019-09-04 DIAGNOSIS — Z01.419 ENCOUNTER FOR GYNECOLOGICAL EXAMINATION (GENERAL) (ROUTINE) WITHOUT ABNORMAL FINDINGS: Primary | ICD-10-CM

## 2019-09-04 DIAGNOSIS — Z30.41 ORAL CONTRACEPTIVE PILL SURVEILLANCE: ICD-10-CM

## 2019-09-04 PROBLEM — Z87.42 HISTORY OF OVARIAN CYST: Status: RESOLVED | Noted: 2018-08-29 | Resolved: 2019-09-04

## 2019-09-04 PROBLEM — R10.2 PELVIC PAIN: Status: RESOLVED | Noted: 2019-06-12 | Resolved: 2019-09-04

## 2019-09-04 PROCEDURE — 99395 PREV VISIT EST AGE 18-39: CPT | Performed by: NURSE PRACTITIONER

## 2019-09-04 RX ORDER — CLINDAMYCIN HYDROCHLORIDE 300 MG/1
300 CAPSULE ORAL 2 TIMES DAILY
Qty: 14 CAPSULE | Refills: 0 | Status: SHIPPED | OUTPATIENT
Start: 2019-09-04 | End: 2019-09-11

## 2019-09-04 NOTE — PROGRESS NOTES
Assessment/Plan:    Encounter for gynecological examination (general) (routine) without abnormal findings  Normal findings on routine annual gyn exam  Recommended monthly SBE, annual CBE  Reviewed ASCCP guidelines and pap noted to be up to date  The patient reports she has completed Gardasil series  STI testing was offered and declined at this time; the patient is aware that condoms are recommended for all sexual contact for prevention of STI  The patient likes current contraceptive measure and desires to continue (OCP)  Reviewed diet/activity recommendations and reasons to call  F/u in one year for routine annual gyn exam or sooner PRN  Oral contraceptive pill surveillance  The patient likes current OCP  She denies ACHES and desires to continue  She plans to f/u with PCP re: rash and we will discuss alternatives PRN  She is aware condoms are advised with all sexual contact for prevention of STI  Refill provided  Reviewed reasons to call  BV (bacterial vaginosis)  Exam c/w Bv  Recommended Clinda, conservative vulvar hygiene, pelvic rest  Reviewed directions for use, risks/benefits, antabuse effects  F/u PRN if sx not improved  Diagnoses and all orders for this visit:    Encounter for gynecological examination (general) (routine) without abnormal findings    BV (bacterial vaginosis)  -     clindamycin (CLEOCIN) 300 MG capsule; Take 1 capsule (300 mg total) by mouth 2 (two) times a day for 7 days    Oral contraceptive pill surveillance  -     Norethin-Eth Estrad-Fe Biphas (LO LOESTRIN FE) 1 MG-10 MCG / 10 MCG TABS; Take 1 tablet by mouth daily          Subjective:      Patient ID: Lola Saleh is a 21 y o  female  This patient presents for routine annual gyn exam    Currently undergoing work up for papular, nonpruritic intermittent rash over hands, elbows, feet  PCP feels this may be related to use of OCP  Labs pending and she has plans to f/u  She denies acute gyn complaints   Withdrawal bleeds are regular and light on OCP; denies ACHES and desires to continue  She denies pelvic pain, breast concerns, abnormal discharge, bowel/bladder dysfunction, depression/anxiety  Long term, monogamous  She denies STI concerns  The following portions of the patient's history were reviewed and updated as appropriate: allergies, current medications, past family history, past medical history, past social history, past surgical history and problem list     Review of Systems   Constitutional: Negative  Respiratory: Negative  Cardiovascular: Negative  Gastrointestinal: Negative  Genitourinary: Negative  Musculoskeletal: Negative  Skin: Negative  Neurological: Negative  Psychiatric/Behavioral: Negative  Objective:      /70   Wt 62 1 kg (137 lb)   LMP 08/14/2019   BMI 20 83 kg/m²          Physical Exam   Constitutional: She is oriented to person, place, and time  She appears well-developed and well-nourished  HENT:   Head: Normocephalic and atraumatic  Eyes: Pupils are equal, round, and reactive to light  EOM are normal    Neck: Normal range of motion  Neck supple  No thyromegaly present  Cardiovascular: Normal rate, regular rhythm and normal heart sounds  Pulmonary/Chest: Effort normal and breath sounds normal  No respiratory distress  She has no wheezes  She has no rales  She exhibits no mass, no tenderness and no deformity  Right breast exhibits no inverted nipple, no mass, no nipple discharge, no skin change and no tenderness  Left breast exhibits no inverted nipple, no mass, no nipple discharge, no skin change and no tenderness  No breast tenderness or discharge  Breasts are symmetrical    Abdominal: Soft  She exhibits no distension and no mass  There is no splenomegaly or hepatomegaly  There is no tenderness  There is no rebound and no guarding  Genitourinary: Rectum normal and uterus normal  No breast tenderness or discharge  No labial fusion   There is no rash, tenderness, lesion or injury on the right labia  There is no rash, tenderness, lesion or injury on the left labia  Cervix exhibits no motion tenderness, no discharge and no friability  Right adnexum displays no mass, no tenderness and no fullness  Left adnexum displays no mass, no tenderness and no fullness  No erythema, tenderness or bleeding in the vagina  No foreign body in the vagina  Vaginal discharge found  Musculoskeletal: Normal range of motion  Lymphadenopathy:     She has no cervical adenopathy  She has no axillary adenopathy  Neurological: She is alert and oriented to person, place, and time  No cranial nerve deficit  Skin: Skin is warm and dry  No rash noted  No cyanosis  Nails show no clubbing  Psychiatric: She has a normal mood and affect   Her speech is normal and behavior is normal  Judgment and thought content normal  Cognition and memory are normal

## 2019-09-04 NOTE — ASSESSMENT & PLAN NOTE
Exam c/w Bv  Recommended Clinda, conservative vulvar hygiene, pelvic rest  Reviewed directions for use, risks/benefits, antabuse effects  F/u PRN if sx not improved

## 2019-09-04 NOTE — ASSESSMENT & PLAN NOTE
The patient likes current OCP  She denies ACHES and desires to continue  She plans to f/u with PCP re: rash and we will discuss alternatives PRN  She is aware condoms are advised with all sexual contact for prevention of STI  Refill provided  Reviewed reasons to call

## 2019-09-04 NOTE — ASSESSMENT & PLAN NOTE
Normal findings on routine annual gyn exam  Recommended monthly SBE, annual CBE  Reviewed ASCCP guidelines and pap noted to be up to date  The patient reports she has completed Gardasil series  STI testing was offered and declined at this time; the patient is aware that condoms are recommended for all sexual contact for prevention of STI  The patient likes current contraceptive measure and desires to continue (OCP)  Reviewed diet/activity recommendations and reasons to call  F/u in one year for routine annual gyn exam or sooner PRN

## 2019-09-20 ENCOUNTER — APPOINTMENT (OUTPATIENT)
Dept: LAB | Facility: MEDICAL CENTER | Age: 24
End: 2019-09-20
Payer: COMMERCIAL

## 2019-09-20 ENCOUNTER — TRANSCRIBE ORDERS (OUTPATIENT)
Dept: ADMINISTRATIVE | Facility: HOSPITAL | Age: 24
End: 2019-09-20

## 2019-09-20 DIAGNOSIS — I77.6 ARTERITIS, UNSPECIFIED (HCC): Primary | ICD-10-CM

## 2019-09-20 DIAGNOSIS — E03.9 HYPOTHYROIDISM, UNSPECIFIED TYPE: ICD-10-CM

## 2019-09-20 LAB
ERYTHROCYTE [SEDIMENTATION RATE] IN BLOOD: 5 MM/HOUR (ref 0–20)
T4 FREE SERPL-MCNC: 1.07 NG/DL (ref 0.76–1.46)
TSH SERPL DL<=0.05 MIU/L-ACNC: 3.52 UIU/ML (ref 0.36–3.74)

## 2019-09-20 PROCEDURE — 36415 COLL VENOUS BLD VENIPUNCTURE: CPT | Performed by: FAMILY MEDICINE

## 2019-09-20 PROCEDURE — 84439 ASSAY OF FREE THYROXINE: CPT | Performed by: FAMILY MEDICINE

## 2019-09-20 PROCEDURE — 84443 ASSAY THYROID STIM HORMONE: CPT | Performed by: FAMILY MEDICINE

## 2019-09-20 PROCEDURE — 85652 RBC SED RATE AUTOMATED: CPT | Performed by: FAMILY MEDICINE

## 2019-12-12 ENCOUNTER — OFFICE VISIT (OUTPATIENT)
Dept: DERMATOLOGY | Facility: CLINIC | Age: 24
End: 2019-12-12
Payer: COMMERCIAL

## 2019-12-12 DIAGNOSIS — L81.8 POSTINFLAMMATORY HYPOPIGMENTATION: ICD-10-CM

## 2019-12-12 DIAGNOSIS — B07.9 VERRUCA VULGARIS: ICD-10-CM

## 2019-12-12 DIAGNOSIS — D22.9 MELANOCYTIC NEVUS, UNSPECIFIED LOCATION: Primary | ICD-10-CM

## 2019-12-12 PROCEDURE — 17110 DESTRUCTION B9 LES UP TO 14: CPT | Performed by: DERMATOLOGY

## 2019-12-12 PROCEDURE — 99202 OFFICE O/P NEW SF 15 MIN: CPT | Performed by: DERMATOLOGY

## 2019-12-12 NOTE — PROGRESS NOTES
[address] [facility] Note     Patient Name: Fitz Moore  Encounter Date: 12/12/19    Today's Chief Concerns:  [facility] Concern #1:  wart   Concern #2:  Two moles      Past Medical History:  Have you ever had or currently have any of the following medical conditions or treatments? · HIV/AIDS: No  · Hepatitis B: No  · Hepatitis C: No   · Diabetes: No  · Tuberculosis: No  · Biologic Therapy/Chemotherapy: No  · Organ or Bone Marrow Transplantation: No  · Radiation Treatment: No  · Cancer (If Yes, which types)- No      Have you ever had any of the following skin conditions? · Melanoma? (If Yes, please provide more detail)- No  · Basal Cell Carcinoma: No  · Squamous Cell Carcinoma: No  · Sebaceous Cell Carcinoma: No  · Merkel Cell Carcinoma: No  · Angiosarcoma: No  · Blistering Sunburns: YES  · Eczema: No  · Psoriasis: No    Social History:    What is your current Smoking Status? 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 Reading, being with family    Family history:  Do any of your "first degree relatives" (parent, brother, sister, or child) have any of the following conditions? · Melanoma? (If Yes, which relatives?) No   · Grandfather with BCC  · Eczema: No  · Asthma: YES  · Hay Fever/Seasonal Allergies: YES  · Psoriasis: No  · Arthritis: No  · Thyroid Problems: YES  · Lupus/Connective Tissue Disease: No  · Diabetes: No  · Stroke: No  · Blood Clots: No  · IBD/Crohn's/Ulcerative Colitis: No  · Vitiligo: No  · Scarring/Keloids: No  · Severe Acne: No  · Pancreatic Cancer: No  · Other known Skin Condition? If Yes, what condition and which relatives?   No    Current Medications:    Current Outpatient Medications:     albuterol (PROAIR HFA) 90 mcg/act inhaler, ProAir HFA 90 mcg/actuation aerosol inhaler, Disp: , Rfl:     ibuprofen (MOTRIN) 600 mg tablet, Take 600 mg by mouth, Disp: , Rfl:     Norethin-Eth Estrad-Fe Biphas (LO LOESTRIN FE) 1 MG-10 MCG / 10 MCG TABS, Take 1 tablet by mouth daily, Disp: 90 tablet, Rfl: 4    Specific Alerts:    Have you been seen by a [facility] Dermatologist in the last 3 years? No    Are you pregnant or planning to become pregnant? No    Are you currently or planning to be nursing or breast feeding? No    No Known Allergies    May we call your Preferred Phone number to discuss your specific medical information? YES    May we leave a detailed message that includes your specific medical information? YES    Have you traveled outside of the [facility] in the past 3 months? No    Do you currently have a pacemaker or defibrillator? No    Do you have any artificial heart valves, joints, plates, screws, rods, stents, pins, etc? No   - If Yes, were any placed within the last 2 years? Do you require any medications prior to a surgical procedure? YES   - If Yes, for which procedure? Major surgery   - If Yes, what medications to you require? albuterol    Are you taking any medications that cause you to bleed more easily ("blood thinners") No    Have you ever experienced a rapid heartbeat with epinephrine? No    Have you ever been treated with "gold" (gold sodium thiomalate) therapy? No    [address]  Dermatology can help with wrinkles, "laugh lines," facial volume loss, "double chin," "love handles," age spots, and more  Are you interested in learning today about some of the skin enhancement procedures that we offer? (If Yes, please provide more detail) No    Review of Systems:  Have you recently had or currently have any of the following?     · Fever or chills: No  · Night Sweats: No  · Headaches: No  · Weight Gain: No  · Weight Loss: No  · Blurry Vision: No  · Nausea: No  · Vomiting: No  · Diarrhea: No  · Blood in Stool: No  · Abdominal Pain: No  · Itchy Skin: No  · Painful Joints: No  · Swollen Joints: No  · Muscle Pain: No  · Irregular Mole: No  · Sun Burn: No  · Dry Skin: No  · Skin Color Changes: No  · Scar or Keloid: No  · Cold Sores/Fever Blisters: No  · Bacterial Infections/MRSA: No  · Anxiety: No  · Depression: No  · Suicidal or Homicidal Thoughts: No      PHYSICAL EXAM:      Was a chaperone (Derm Clinical Assistant) present for the entirety of the Physical Exam? No    Did the Dermatology Team specifically ask and  the patient on the importance of a Full Skin Exam to be sure that nothing is missed clinically? YES    Did the patient request or accept a Full Skin Exam?  No    Did the patient specifically refuse to have the areas "under-the-bra" examined by the Dermatologist? No    Did the patient specifically refuse to have the areas "under-the-underwear" examined by the Dermatologist? No      CONSTITUTIONAL:   There were no vitals filed for this visit  PSYCH: Normal mood and affect  EYES: Normal conjunctiva  ENT: Normal lips and oral mucosa  CARDIOVASCULAR: No edema  RESPIRATORY: Normal respirations       ORGAN SYSTEM SKIN EXAM (SKIN)   Ears, Face Normal except as noted below in Assessment   Neck,  Normal except as noted below in Assessment   Right Arm/Hand/Fingers Normal except as noted below in Assessment   Left Arm/Hand/Fingers Normal except as noted below in Assessment           Back/Spine Normal except as noted below in Assessment                    ASSESSMENT AND PLAN BY DIAGNOSIS:    History of Present Condition:     Duration:  How long has this been an issue for you?    o  years   Location Affected:  Where on the body is this affecting you?    o  left third finger   Quality:  Is there any bleeding, pain, itch, burning/irritation, or redness associated with the skin lesion? o  pain   Severity:  Describe any bleeding, pain, itch, burning/irritation, or redness on a scale of 1 to 10 (with 10 being the worst)  o  2   Timing:  Does this condition seem to be there pretty constantly or do you notice it more at specific times throughout the day?     o  constantly   Context:  Have you ever noticed that this condition seems to be associated with specific activities you do?    o  no   Modifying Factors:    o Anything that seems to make the condition worse?    -  deny  o What have you tried to do to make the condition better?    -  deny   Associated Signs and Symptoms:  Does this skin lesion seem to be associated with any of the following:  o  DERM ASSOCIATED SIGNS AND SYMPTOMS: Redness     1  VERRUCA VULGARIS ("Common Warts")    Physical Exam:   Anatomic Location Affected:  Left third finger   Morphological Description:  Keratotic verrucous papule    Assessment and Plan:  Based on a thorough discussion of this condition and the management approach to it (including a comprehensive discussion of the known risks, side effects and potential benefits of treatment), the patient (family) agrees to implement the following specific plan:   Cantharone today   bandaid applied   Wash in 4 hours   In 1 week, start salicylic acid 56% OTC pads overnight, file with file or pumice stone in am   May need repeat treatment    PROCEDURE:  DESTRUCTION OF BENIGN LESIONS WITH CHEMICAL Reynold Botello  After a thorough discussion of treatment options and risk/benefits/alternatives (including but not limited to local pain, scarring, dyspigmentation, blistering, recurrence, no change, and possible superinfection), verbal and written consent were obtained and the aforementioned lesions were treated with cantharone as chemical destruction   TOTAL NUMBER of 1 benign verruca were treated today on the ANATOMIC LOCATION: left third finger  The patient tolerated the procedure well, and after-care instructions were provided  A comprehensive handout with after-care instructions was provided  The patient's family understands to call [phone] (SKIN) with any questions or concerns        2  MELANOCYTIC NEVI ("Moles")    Physical Exam:   Anatomic Location Affected:   Mid back   Morphological Description:  Dark brown symmetric macule, no concerning features on dermoscopy    Assessment and Plan:  Based on a thorough discussion of this condition and the management approach to it (including a comprehensive discussion of the known risks, side effects and potential benefits of treatment), the patient (family) agrees to implement the following specific plan:  Monitor for changes  Benign, reassured   Regular skin exams   Sun protection       3   Postinflammatory hypopigmentation  Forearms with hypopigmented macules  History of hives in the past    - benign, reassured  - sun protection

## 2020-01-28 ENCOUNTER — TRANSCRIBE ORDERS (OUTPATIENT)
Dept: ADMINISTRATIVE | Facility: HOSPITAL | Age: 25
End: 2020-01-28

## 2020-01-28 ENCOUNTER — APPOINTMENT (OUTPATIENT)
Dept: LAB | Facility: MEDICAL CENTER | Age: 25
End: 2020-01-28
Payer: COMMERCIAL

## 2020-01-28 DIAGNOSIS — R53.83 FATIGUE, UNSPECIFIED TYPE: Primary | ICD-10-CM

## 2020-01-28 DIAGNOSIS — E03.9 HYPOTHYROIDISM, UNSPECIFIED TYPE: ICD-10-CM

## 2020-01-28 DIAGNOSIS — R53.83 FATIGUE, UNSPECIFIED TYPE: ICD-10-CM

## 2020-01-28 DIAGNOSIS — E55.9 VITAMIN D DEFICIENCY, UNSPECIFIED: ICD-10-CM

## 2020-01-28 LAB
25(OH)D3 SERPL-MCNC: 19.1 NG/ML (ref 30–100)
BASOPHILS # BLD AUTO: 0.01 THOUSANDS/ΜL (ref 0–0.1)
BASOPHILS NFR BLD AUTO: 0 % (ref 0–1)
EOSINOPHIL # BLD AUTO: 0.27 THOUSAND/ΜL (ref 0–0.61)
EOSINOPHIL NFR BLD AUTO: 5 % (ref 0–6)
ERYTHROCYTE [DISTWIDTH] IN BLOOD BY AUTOMATED COUNT: 12.7 % (ref 11.6–15.1)
FOLATE SERPL-MCNC: 6.5 NG/ML (ref 3.1–17.5)
HCT VFR BLD AUTO: 43.7 % (ref 34.8–46.1)
HGB BLD-MCNC: 13.7 G/DL (ref 11.5–15.4)
IMM GRANULOCYTES # BLD AUTO: 0.02 THOUSAND/UL (ref 0–0.2)
IMM GRANULOCYTES NFR BLD AUTO: 0 % (ref 0–2)
LYMPHOCYTES # BLD AUTO: 2.68 THOUSANDS/ΜL (ref 0.6–4.47)
LYMPHOCYTES NFR BLD AUTO: 45 % (ref 14–44)
MCH RBC QN AUTO: 29.8 PG (ref 26.8–34.3)
MCHC RBC AUTO-ENTMCNC: 31.4 G/DL (ref 31.4–37.4)
MCV RBC AUTO: 95 FL (ref 82–98)
MONOCYTES # BLD AUTO: 0.4 THOUSAND/ΜL (ref 0.17–1.22)
MONOCYTES NFR BLD AUTO: 7 % (ref 4–12)
NEUTROPHILS # BLD AUTO: 2.51 THOUSANDS/ΜL (ref 1.85–7.62)
NEUTS SEG NFR BLD AUTO: 43 % (ref 43–75)
NRBC BLD AUTO-RTO: 0 /100 WBCS
PLATELET # BLD AUTO: 273 THOUSANDS/UL (ref 149–390)
PMV BLD AUTO: 11.4 FL (ref 8.9–12.7)
RBC # BLD AUTO: 4.6 MILLION/UL (ref 3.81–5.12)
T4 FREE SERPL-MCNC: 0.79 NG/DL (ref 0.76–1.46)
TSH SERPL DL<=0.05 MIU/L-ACNC: 8.12 UIU/ML (ref 0.36–3.74)
VIT B12 SERPL-MCNC: 177 PG/ML (ref 100–900)
WBC # BLD AUTO: 5.89 THOUSAND/UL (ref 4.31–10.16)

## 2020-01-28 PROCEDURE — 36415 COLL VENOUS BLD VENIPUNCTURE: CPT | Performed by: FAMILY MEDICINE

## 2020-01-28 PROCEDURE — 84439 ASSAY OF FREE THYROXINE: CPT | Performed by: FAMILY MEDICINE

## 2020-01-28 PROCEDURE — 85025 COMPLETE CBC W/AUTO DIFF WBC: CPT | Performed by: FAMILY MEDICINE

## 2020-01-28 PROCEDURE — 82746 ASSAY OF FOLIC ACID SERUM: CPT

## 2020-01-28 PROCEDURE — 84443 ASSAY THYROID STIM HORMONE: CPT | Performed by: FAMILY MEDICINE

## 2020-01-28 PROCEDURE — 82306 VITAMIN D 25 HYDROXY: CPT | Performed by: FAMILY MEDICINE

## 2020-01-28 PROCEDURE — 82607 VITAMIN B-12: CPT | Performed by: FAMILY MEDICINE

## 2020-01-31 ENCOUNTER — OFFICE VISIT (OUTPATIENT)
Dept: ENDOCRINOLOGY | Facility: CLINIC | Age: 25
End: 2020-01-31
Payer: COMMERCIAL

## 2020-01-31 VITALS
HEART RATE: 63 BPM | SYSTOLIC BLOOD PRESSURE: 102 MMHG | HEIGHT: 68 IN | WEIGHT: 140.4 LBS | BODY MASS INDEX: 21.28 KG/M2 | DIASTOLIC BLOOD PRESSURE: 64 MMHG

## 2020-01-31 DIAGNOSIS — E03.9 HYPOTHYROIDISM, UNSPECIFIED TYPE: Primary | ICD-10-CM

## 2020-01-31 PROCEDURE — 99204 OFFICE O/P NEW MOD 45 MIN: CPT | Performed by: INTERNAL MEDICINE

## 2020-01-31 RX ORDER — MAG HYDROX/ALUMINUM HYD/SIMETH 400-400-40
5000 SUSPENSION, ORAL (FINAL DOSE FORM) ORAL DAILY
COMMUNITY
End: 2022-07-27

## 2020-01-31 RX ORDER — LEVOTHYROXINE SODIUM 0.05 MG/1
112 TABLET ORAL DAILY
COMMUNITY
Start: 2020-01-29 | End: 2022-07-27

## 2020-01-31 NOTE — PROGRESS NOTES
Chief Complaint   Patient presents with   [address]     Patient is new and establishing care  She was told by her PCP that her labs were abnormal suggesting a thyroid disorder  She was dx with hypothyroidism and started on Levothyroxine  Referring Provider  Ludwin De Do  [address] [facility], [address]     History of Present Illness:   Nato Garcia is a 25 y o  female with hypothyroidism seen to establish care  Her TSH has been changing over the past 4-6mos, and she has some concerning symptoms that prompted her to request testing from her primary care physician  She ahs a very strong family hx of thyroid disease listed below  Her main symptom is fatigue, but she reports feeling cold, noting some weight gain, and feeling more anxious in this time frame  Her hair has been falling for a long time, and she does not see major balding  She denies constipation  She denies changes in neck, changes in voice, or dysphagia, and has not had recent Illnesses        She started Levothyroxine 50mcg first dose this week  She was told to take this in the morning,  from food or other meds  Currently, she takes OCPs and is not planning a pregnancy at this time, but will consider this in the future  Other med hx is asthma and ortho procedures from past sports injuries  Fhx:   thyroid disorder- paternal aunt (hypothyroid and thyroid cancer), paternal GM (thyroid issues, taking medication), uncle thyroid cancer; great aunts; mother hypothyroidism and nodule  Recalls that her aunt and uncle had to be isolated from others       Patient Active Problem List   Diagnosis    ADD (attention deficit disorder)    Asthma    Folliculitis    Scoliosis    Urticaria    Oral contraceptive pill surveillance    Breast asymmetry    Encounter for gynecological examination (general) (routine) without abnormal findings    Chronic pain of left knee    Internal derangement of left knee    Other tear of medial meniscus, current injury, left knee, initial encounter    Aftercare following surgery of the musculoskeletal system    Status post arthroscopy of left knee    Hemarthrosis of left knee    BV (bacterial vaginosis)    Hypothyroidism      Past Medical History:   Diagnosis Date    Asthma     Hypothyroidism     Scoliosis       Past Surgical History:   Procedure Laterality Date    GANGLION CYST EXCISION      KNEE ARTHROSCOPY W/ MENISCAL REPAIR      IN KNEE SCOPE,SINGLE MENISECTOMY Left 12/6/2018    Procedure: KNEE ARTHROSCOPY; SYNOVECTOMY; PATELLOFEMORAL CHONDROPLASTY;  Surgeon: Rebecca Fernandes MD;  [facility];  Service: Orthopedics    SHOULDER SURGERY      TONSILLECTOMY      WISDOM TOOTH EXTRACTION  05/01/2019      Family History   Problem Relation Age of Onset    Hypothyroidism Mother     No Known Problems Father     Breast cancer Paternal Grandmother     Thyroid cancer Paternal Grandmother     Diabetes Maternal Grandmother     No Known Problems Maternal Grandfather     Thyroid cancer Paternal Aunt     Basal cell carcinoma Paternal Grandfather     Thyroid cancer Paternal Uncle      Social History     Tobacco Use    Smoking status: Never Smoker    Smokeless tobacco: Never Used   Substance Use Topics    Alcohol use: No     No Known Allergies      Current Outpatient Medications:     albuterol (PROAIR HFA) 90 mcg/act inhaler, ProAir HFA 90 mcg/actuation aerosol inhaler, Disp: , Rfl:     Cholecalciferol (VITAMIN D3) 125 MCG (5000 UT) CAPS, Take 5,000 Units by mouth daily, Disp: , Rfl:     levothyroxine 50 mcg tablet, Take 50 mcg by mouth daily, Disp: , Rfl:     Norethin-Eth Estrad-Fe Biphas (LO LOESTRIN FE) 1 MG-10 MCG / 10 MCG TABS, Take 1 tablet by mouth daily, Disp: 90 tablet, Rfl: 4  Review of Systems   Constitutional: Positive for fatigue and unexpected weight change  Negative for chills and fever  HENT: Negative for trouble swallowing and voice change      Eyes: Negative for visual disturbance  Respiratory: Negative for cough and wheezing  Cardiovascular: Negative for chest pain and palpitations  Gastrointestinal: Negative for constipation, diarrhea, nausea and vomiting  Endocrine: Positive for cold intolerance  Musculoskeletal: Positive for arthralgias and myalgias  Skin: Negative for color change  Neurological: Negative for tremors  Psychiatric/Behavioral: The patient is nervous/anxious  All other systems reviewed and are negative  See also HPI    Physical Exam:  Body mass index is 21 35 kg/m²  /64 (BP Location: Left arm, Patient Position: Sitting, Cuff Size: Standard)   Pulse 63   Ht 5' 8" (1 727 m)   Wt 63 7 kg (140 lb 6 4 oz)   BMI 21 35 kg/m²    Wt Readings from Last 3 Encounters:   01/31/20 63 7 kg (140 lb 6 4 oz)   09/04/19 62 1 kg (137 lb)   06/12/19 64 kg (141 lb)       GEN: NAD  E/n/m nl facies, hearing intact bilat, tongue midline, lips nl  Eyes: no stare or proptosis, nl lids and conjunctiva, EOMI  Neck: trachea midline, thyroid NT to palpation, nl in size, no nodules or neck masses noted  CV; heart reg rate s1s2 nl, no m/r/g appreciated, no ISATU  Resp: CTAB, good effort  Ab+BS  Neuro: no tremor, DTRs difficult to elicit in BUE  MS: no c/c in digits, nl muscle bulk, gait nl, strength 5/5 BUE  Skin: warm and dry, no palmar erythema  Psych: nl mood and affect, no gross lapses in memory    DATA:  Labs:       Lab Results   Component Value Date    FREET4 0 79 01/28/2020     Lab Results   Component Value Date    [id number] 8 120 (H) 01/28/2020                 TSH  4/2019     4 11  9/2019    3 52, free T4 1 07  1/28/2020 8 150, Free T4 0 79     Radiology  n/a  Impression:  1  Hypothyroidism, unspecified type           Plan:    Jana Marquez was seen today for establish care  Diagnoses and all orders for this visit:    Hypothyroidism, unspecified type  -     TSH, 3rd generation Lab Collect;  Future      There are no diagnoses linked to this encounter  1  Hypothyroidism: reviewed that the most common cause is autoimmune hypothyroidism which would be c/w her family hx  At this time, I agree with levothyroxine 50mcg with repeat TSH in 6 weeks  This order is provided  I reviewed the need for normal thyroid hormone levels for women planning pregnancies, and to have healthy pregnancies and fetal development  Discussed with the patient and all questioned fully answered  She will call me if any problems arise          So Yañez MD

## 2020-02-06 ENCOUNTER — HOSPITAL ENCOUNTER (OUTPATIENT)
Dept: RADIOLOGY | Facility: MEDICAL CENTER | Age: 25
Discharge: HOME | End: 2020-02-06
Payer: COMMERCIAL

## 2020-02-06 DIAGNOSIS — E03.9 HYPOTHYROIDISM, UNSPECIFIED TYPE: ICD-10-CM

## 2020-02-06 PROCEDURE — 76536 US EXAM OF HEAD AND NECK: CPT

## 2020-03-26 ENCOUNTER — APPOINTMENT (OUTPATIENT)
Dept: LAB | Facility: MEDICAL CENTER | Age: 25
End: 2020-03-26
Payer: COMMERCIAL

## 2020-03-26 ENCOUNTER — TRANSCRIBE ORDERS (OUTPATIENT)
Dept: ADMINISTRATIVE | Facility: HOSPITAL | Age: 25
End: 2020-03-26

## 2020-03-26 DIAGNOSIS — R53.83 FATIGUE, UNSPECIFIED TYPE: ICD-10-CM

## 2020-03-26 DIAGNOSIS — E55.9 VITAMIN D DEFICIENCY, UNSPECIFIED: ICD-10-CM

## 2020-03-26 DIAGNOSIS — E03.9 HYPOTHYROIDISM, UNSPECIFIED TYPE: Primary | ICD-10-CM

## 2020-03-26 LAB
25(OH)D3 SERPL-MCNC: 35.2 NG/ML (ref 30–100)
T4 FREE SERPL-MCNC: 1.12 NG/DL (ref 0.76–1.46)
TSH SERPL DL<=0.05 MIU/L-ACNC: 3.97 UIU/ML (ref 0.36–3.74)

## 2020-03-26 PROCEDURE — 82306 VITAMIN D 25 HYDROXY: CPT | Performed by: FAMILY MEDICINE

## 2020-03-26 PROCEDURE — 84443 ASSAY THYROID STIM HORMONE: CPT | Performed by: FAMILY MEDICINE

## 2020-03-26 PROCEDURE — 36415 COLL VENOUS BLD VENIPUNCTURE: CPT | Performed by: FAMILY MEDICINE

## 2020-03-26 PROCEDURE — 84439 ASSAY OF FREE THYROXINE: CPT | Performed by: FAMILY MEDICINE

## 2020-04-06 ENCOUNTER — OFFICE VISIT (OUTPATIENT)
Dept: DERMATOLOGY | Facility: CLINIC | Age: 25
End: 2020-04-06
Payer: COMMERCIAL

## 2020-04-06 VITALS — WEIGHT: 137 LBS | TEMPERATURE: 96.5 F | HEIGHT: 68 IN | BODY MASS INDEX: 20.76 KG/M2

## 2020-04-06 DIAGNOSIS — T69.1XXD CHILBLAINS, SUBSEQUENT ENCOUNTER: Primary | ICD-10-CM

## 2020-04-06 DIAGNOSIS — D48.9 NEOPLASM OF UNCERTAIN BEHAVIOR: ICD-10-CM

## 2020-04-06 PROCEDURE — 88313 SPECIAL STAINS GROUP 2: CPT | Performed by: STUDENT IN AN ORGANIZED HEALTH CARE EDUCATION/TRAINING PROGRAM

## 2020-04-06 PROCEDURE — 88312 SPECIAL STAINS GROUP 1: CPT | Performed by: STUDENT IN AN ORGANIZED HEALTH CARE EDUCATION/TRAINING PROGRAM

## 2020-04-06 PROCEDURE — 88305 TISSUE EXAM BY PATHOLOGIST: CPT | Performed by: STUDENT IN AN ORGANIZED HEALTH CARE EDUCATION/TRAINING PROGRAM

## 2020-04-06 PROCEDURE — 99213 OFFICE O/P EST LOW 20 MIN: CPT | Performed by: DERMATOLOGY

## 2020-04-06 PROCEDURE — 11102 TANGNTL BX SKIN SINGLE LES: CPT | Performed by: DERMATOLOGY

## 2020-04-17 ENCOUNTER — APPOINTMENT (OUTPATIENT)
Dept: LAB | Facility: MEDICAL CENTER | Age: 25
End: 2020-04-17
Payer: COMMERCIAL

## 2020-04-17 DIAGNOSIS — T69.1XXD CHILBLAINS, SUBSEQUENT ENCOUNTER: ICD-10-CM

## 2020-04-17 PROCEDURE — 36415 COLL VENOUS BLD VENIPUNCTURE: CPT

## 2020-04-17 PROCEDURE — 86430 RHEUMATOID FACTOR TEST QUAL: CPT

## 2020-04-17 PROCEDURE — 86235 NUCLEAR ANTIGEN ANTIBODY: CPT

## 2020-04-17 PROCEDURE — 84165 PROTEIN E-PHORESIS SERUM: CPT

## 2020-04-17 PROCEDURE — 84165 PROTEIN E-PHORESIS SERUM: CPT | Performed by: PATHOLOGY

## 2020-04-18 LAB
ENA SS-A AB SER-ACNC: <0.2 AI (ref 0–0.9)
ENA SS-B AB SER-ACNC: <0.2 AI (ref 0–0.9)

## 2020-04-20 LAB — RHEUMATOID FACT SER QL LA: NEGATIVE

## 2020-04-21 ENCOUNTER — LAB (OUTPATIENT)
Dept: LAB | Facility: CLINIC | Age: 25
End: 2020-04-21
Payer: COMMERCIAL

## 2020-04-21 DIAGNOSIS — T69.1XXS CHILBLAIN LUPUS ERYTHEMATOSUS, SEQUELA: Primary | ICD-10-CM

## 2020-04-21 DIAGNOSIS — T69.1XXD CHILBLAINS, SUBSEQUENT ENCOUNTER: ICD-10-CM

## 2020-04-21 LAB
ALBUMIN SERPL ELPH-MCNC: 4.8 G/DL (ref 3.5–5)
ALBUMIN SERPL ELPH-MCNC: 64.8 % (ref 52–65)
ALPHA1 GLOB SERPL ELPH-MCNC: 0.19 G/DL (ref 0.1–0.4)
ALPHA1 GLOB SERPL ELPH-MCNC: 2.6 % (ref 2.5–5)
ALPHA2 GLOB SERPL ELPH-MCNC: 0.69 G/DL (ref 0.4–1.2)
ALPHA2 GLOB SERPL ELPH-MCNC: 9.3 % (ref 7–13)
BETA GLOB ABNORMAL SERPL ELPH-MCNC: 0.42 G/DL (ref 0.4–0.8)
BETA1 GLOB SERPL ELPH-MCNC: 5.7 % (ref 5–13)
BETA2 GLOB SERPL ELPH-MCNC: 3.6 % (ref 2–8)
BETA2+GAMMA GLOB SERPL ELPH-MCNC: 0.27 G/DL (ref 0.2–0.5)
GAMMA GLOB ABNORMAL SERPL ELPH-MCNC: 1.04 G/DL (ref 0.5–1.6)
GAMMA GLOB SERPL ELPH-MCNC: 14 % (ref 12–22)
IGG/ALB SER: 1.84 {RATIO} (ref 1.1–1.8)
PROT PATTERN SERPL ELPH-IMP: ABNORMAL
PROT SERPL-MCNC: 7.4 G/DL (ref 6.4–8.2)

## 2020-04-21 PROCEDURE — 82595 ASSAY OF CRYOGLOBULIN: CPT

## 2020-04-21 PROCEDURE — 36415 COLL VENOUS BLD VENIPUNCTURE: CPT

## 2020-04-21 PROCEDURE — 83520 IMMUNOASSAY QUANT NOS NONAB: CPT

## 2020-04-21 PROCEDURE — 82585 ASSAY OF CRYOFIBRINOGEN: CPT

## 2020-04-26 LAB — CRYOFIB PLAS QL: NORMAL

## 2020-04-27 LAB — CRYOGLOB SER QL 1D COLD INC: NORMAL

## 2020-04-29 RX ORDER — NIFEDIPINE 10 MG/1
10 CAPSULE ORAL 2 TIMES DAILY
Qty: 90 CAPSULE | Refills: 2 | Status: SHIPPED | OUTPATIENT
Start: 2020-04-29 | End: 2022-02-03

## 2020-04-29 RX ORDER — CLOBETASOL PROPIONATE 0.5 MG/G
CREAM TOPICAL 2 TIMES DAILY
Qty: 60 G | Refills: 2 | Status: SHIPPED | OUTPATIENT
Start: 2020-04-29 | End: 2020-10-13

## 2020-04-30 LAB — MISCELLANEOUS LAB TEST RESULT: NORMAL

## 2020-06-25 ENCOUNTER — TELEMEDICINE (OUTPATIENT)
Dept: DERMATOLOGY | Facility: CLINIC | Age: 25
End: 2020-06-25
Payer: COMMERCIAL

## 2020-06-25 VITALS — HEIGHT: 68 IN | BODY MASS INDEX: 19.7 KG/M2 | TEMPERATURE: 97.8 F | WEIGHT: 130 LBS

## 2020-06-25 DIAGNOSIS — L23.1 ALLERGIC CONTACT DERMATITIS DUE TO ADHESIVES: Primary | ICD-10-CM

## 2020-06-25 PROCEDURE — 99213 OFFICE O/P EST LOW 20 MIN: CPT | Performed by: DERMATOLOGY

## 2020-06-25 RX ORDER — BIMATOPROST 3 UG/ML
SOLUTION TOPICAL
Qty: 5 ML | Refills: 1 | Status: SHIPPED | OUTPATIENT
Start: 2020-06-25 | End: 2020-07-23 | Stop reason: SDUPTHER

## 2020-07-23 DIAGNOSIS — L23.1 ALLERGIC CONTACT DERMATITIS DUE TO ADHESIVES: ICD-10-CM

## 2020-07-23 RX ORDER — BIMATOPROST 3 UG/ML
SOLUTION TOPICAL
Qty: 3 ML | Refills: 2 | Status: SHIPPED | OUTPATIENT
Start: 2020-07-23 | End: 2022-01-20

## 2020-07-23 NOTE — TELEPHONE ENCOUNTER
Patient called requesting a smaller size of medication due to cost at [facility] and would like a printed script

## 2020-08-11 DIAGNOSIS — L70.0 ACNE VULGARIS: Primary | ICD-10-CM

## 2020-08-11 NOTE — PROGRESS NOTES
Spoke to patient  Getting acne likely secondary to mask  Using differin currently  Sent rx of tretinoin 0 025% to pharmacy  Advised to transition slowly from differin to tretinoin

## 2020-08-27 DIAGNOSIS — L70.0 ACNE VULGARIS: Primary | ICD-10-CM

## 2020-08-27 RX ORDER — TRETINOIN 0.4 MG/G
GEL TOPICAL
Qty: 45 G | Refills: 0 | Status: SHIPPED | OUTPATIENT
Start: 2020-08-27 | End: 2021-06-22 | Stop reason: ALTCHOICE

## 2020-09-10 ENCOUNTER — ANNUAL EXAM (OUTPATIENT)
Dept: OBGYN CLINIC | Facility: MEDICAL CENTER | Age: 25
End: 2020-09-10
Payer: COMMERCIAL

## 2020-09-10 VITALS
BODY MASS INDEX: 19.77 KG/M2 | DIASTOLIC BLOOD PRESSURE: 62 MMHG | WEIGHT: 130 LBS | SYSTOLIC BLOOD PRESSURE: 100 MMHG | TEMPERATURE: 97.1 F

## 2020-09-10 DIAGNOSIS — Z01.419 ENCOUNTER FOR GYNECOLOGICAL EXAMINATION (GENERAL) (ROUTINE) WITHOUT ABNORMAL FINDINGS: Primary | ICD-10-CM

## 2020-09-10 DIAGNOSIS — Z12.4 ENCOUNTER FOR PAPANICOLAOU SMEAR FOR CERVICAL CANCER SCREENING: ICD-10-CM

## 2020-09-10 DIAGNOSIS — Z30.41 ORAL CONTRACEPTIVE PILL SURVEILLANCE: ICD-10-CM

## 2020-09-10 PROCEDURE — 99395 PREV VISIT EST AGE 18-39: CPT | Performed by: NURSE PRACTITIONER

## 2020-09-10 PROCEDURE — G0145 SCR C/V CYTO,THINLAYER,RESCR: HCPCS | Performed by: NURSE PRACTITIONER

## 2020-09-10 RX ORDER — NORGESTIMATE AND ETHINYL ESTRADIOL 7DAYSX3 LO
1 KIT ORAL DAILY
Qty: 90 TABLET | Refills: 4 | Status: SHIPPED | OUTPATIENT
Start: 2020-09-10 | End: 2022-01-20

## 2020-09-10 NOTE — ASSESSMENT & PLAN NOTE
Interested in restarting [address] but would like trial of different brand due to weight gain on lolo  Rx sent for OTC Lo  Reviewed risks/benefits, SEs/AEs  She denies personal/FH VTE risk factors  Reviewed importance of condom use for STI prevention as well as for back up  Recommended Sunday start  Reviewed ACHES and reasons to call  F/u PRN if not satisfied with this brand

## 2020-09-10 NOTE — ASSESSMENT & PLAN NOTE
Normal findings on routine annual gyn exam  Recommended monthly SBE, annual CBE  Reviewed ASCCP guidelines and pap was collected today  The patient reports she has completed Gardasil series  STI testing was offered and declined at this time; the patient is aware that condoms are recommended for all sexual contact for prevention of STI  The patient plans to restart SUDHIR for contra; currently using condoms  Reviewed diet/activity recommendations and reasons to call  F/u in one year for routine annual gyn exam or sooner PRN

## 2020-09-10 NOTE — PROGRESS NOTES
Assessment/Plan:    Oral contraceptive pill surveillance  [address] in [place]ing [address] but would like trial of different brand due to weight gain on lolo  Rx sent for OTC Lo  Reviewed risks/benefits, SEs/AEs  She denies personal/FH VTE risk factors  Reviewed importance of condom use for STI prevention as well as for back up  Recommended Sunday start  Reviewed ACHES and reasons to call  F/u PRN if not satisfied with this brand  Encounter for gynecological examination (general) (routine) without abnormal findings  Normal findings on routine annual gyn exam  Recommended monthly SBE, annual CBE  Reviewed ASCCP guidelines and pap was collected today  The patient reports she has completed Gardasil series  STI testing was offered and declined at this time; the patient is aware that condoms are recommended for all sexual contact for prevention of STI  The patient plans to restart SUDHIR for contra; currently using condoms  Reviewed diet/activity recommendations and reasons to call  F/u in one year for routine annual gyn exam or sooner PRN  Diagnoses and all orders for this visit:    Encounter for gynecological examination (general) (routine) without abnormal findings    Encounter for Papanicolaou smear for cervical cancer screening  -     Liquid-based pap, screening    Oral contraceptive pill surveillance  -     norgestimate-ethinyl estradiol (ORTHO TRI-CYCLEN LO) 0 18/0 215/0 25 MG-25 MCG per tablet; Take 1 tablet by mouth daily          Subjective:      Patient ID: Soledad Iraheta is a 25 y o  female  This patient presents for routine annual gyn exam    Medically stable  Recently dx'd with hypothyroidism; being fiollowed by PCP  Stopped OCP to take a break  Acne has returned and she is interested in restarting a different brand   She denies acute gyn complaints  She denies pelvic pain, breast concerns, abnormal discharge, bowel/bladder dysfunction, depression/anxiety  Engaged and monogamous   She denies STI concerns  BTL for contraception  Works at [employer] and loves it       The following portions of the patient's history were reviewed and updated as appropriate: allergies, current medications, past family history, past medical history, past social history, past surgical history and problem list     Review of Systems   Constitutional: Negative  Respiratory: Negative  Cardiovascular: Negative  Gastrointestinal: Negative  Genitourinary: Negative  Musculoskeletal: Negative  Skin: Negative  Neurological: Negative  Psychiatric/Behavioral: Negative  Objective:      /62   Temp (!) 97 1 °F (36 2 °C)   Wt 59 kg (130 lb)   LMP 08/25/2020   BMI 19 77 kg/m²          Physical Exam  Constitutional:       Appearance: She is well-developed  HENT:      Head: Normocephalic and atraumatic  Eyes:      Pupils: Pupils are equal, round, and reactive to light  Neck:      Musculoskeletal: Normal range of motion and neck supple  Thyroid: No thyromegaly  Cardiovascular:      Rate and Rhythm: Normal rate and regular rhythm  Heart sounds: Normal heart sounds  Pulmonary:      Effort: Pulmonary effort is normal  No respiratory distress  Breath sounds: Normal breath sounds  No wheezing or rales  Chest:      Chest wall: No mass, deformity or tenderness  Breasts: Breasts are symmetrical          Right: No inverted nipple, mass, nipple discharge, skin change or tenderness  Left: No inverted nipple, mass, nipple discharge, skin change or tenderness  Abdominal:      General: There is no distension  Palpations: Abdomen is soft  There is no hepatomegaly, splenomegaly or mass  Tenderness: There is no abdominal tenderness  There is no guarding or rebound  Genitourinary:     Labia:         Right: No rash, tenderness, lesion or injury  Left: No rash, tenderness, lesion or injury  Vagina: Normal  No foreign body   No vaginal discharge, erythema, tenderness or bleeding  Cervix: No cervical motion tenderness, discharge or friability  Adnexa:         Right: No mass, tenderness or fullness  Left: No mass, tenderness or fullness  Rectum: Normal    Musculoskeletal: Normal range of motion  Lymphadenopathy:      Cervical: No cervical adenopathy  Skin:     General: Skin is warm and dry  Findings: No rash  Nails: There is no clubbing  Neurological:      Mental Status: She is alert and oriented to person, place, and time  Cranial Nerves: No cranial nerve deficit  Psychiatric:         Speech: Speech normal          Behavior: Behavior normal          Thought Content:  Thought content normal          Judgment: Judgment normal

## 2020-09-15 LAB
LAB AP GYN PRIMARY INTERPRETATION: NORMAL
Lab: NORMAL
PATH INTERP SPEC-IMP: NORMAL

## 2020-10-13 ENCOUNTER — OFFICE VISIT (OUTPATIENT)
Dept: DERMATOLOGY | Facility: CLINIC | Age: 25
End: 2020-10-13
Payer: COMMERCIAL

## 2020-10-13 DIAGNOSIS — L70.0 ACNE VULGARIS: Primary | ICD-10-CM

## 2020-10-13 DIAGNOSIS — Z79.899 HIGH RISK MEDICATION USE: ICD-10-CM

## 2020-10-13 LAB — SL AMB POCT URINE HCG: NEGATIVE

## 2020-10-13 PROCEDURE — 99213 OFFICE O/P EST LOW 20 MIN: CPT | Performed by: DERMATOLOGY

## 2020-10-13 PROCEDURE — 81025 URINE PREGNANCY TEST: CPT | Performed by: DERMATOLOGY

## 2020-10-16 ENCOUNTER — APPOINTMENT (OUTPATIENT)
Dept: LAB | Facility: MEDICAL CENTER | Age: 25
End: 2020-10-16
Payer: COMMERCIAL

## 2020-10-16 ENCOUNTER — TRANSCRIBE ORDERS (OUTPATIENT)
Dept: ADMINISTRATIVE | Facility: HOSPITAL | Age: 25
End: 2020-10-16

## 2020-10-16 DIAGNOSIS — E03.9 HYPOTHYROIDISM, UNSPECIFIED TYPE: Primary | ICD-10-CM

## 2020-10-16 DIAGNOSIS — R63.4 LOSS OF WEIGHT: ICD-10-CM

## 2020-10-16 LAB
ALBUMIN SERPL BCP-MCNC: 4.5 G/DL (ref 3.5–5)
ALP SERPL-CCNC: 74 U/L (ref 46–116)
ALT SERPL W P-5'-P-CCNC: 34 U/L (ref 12–78)
ANION GAP SERPL CALCULATED.3IONS-SCNC: 3 MMOL/L (ref 4–13)
AST SERPL W P-5'-P-CCNC: 18 U/L (ref 5–45)
BASOPHILS # BLD AUTO: 0.01 THOUSANDS/ΜL (ref 0–0.1)
BASOPHILS NFR BLD AUTO: 0 % (ref 0–1)
BILIRUB SERPL-MCNC: 0.72 MG/DL (ref 0.2–1)
BUN SERPL-MCNC: 9 MG/DL (ref 5–25)
CALCIUM SERPL-MCNC: 9.2 MG/DL (ref 8.3–10.1)
CHLORIDE SERPL-SCNC: 107 MMOL/L (ref 100–108)
CHOLEST SERPL-MCNC: 178 MG/DL (ref 50–200)
CO2 SERPL-SCNC: 29 MMOL/L (ref 21–32)
CREAT SERPL-MCNC: 0.82 MG/DL (ref 0.6–1.3)
EOSINOPHIL # BLD AUTO: 0.16 THOUSAND/ΜL (ref 0–0.61)
EOSINOPHIL NFR BLD AUTO: 3 % (ref 0–6)
ERYTHROCYTE [DISTWIDTH] IN BLOOD BY AUTOMATED COUNT: 12.8 % (ref 11.6–15.1)
GFR SERPL CREATININE-BSD FRML MDRD: 100 ML/MIN/1.73SQ M
GLUCOSE P FAST SERPL-MCNC: 87 MG/DL (ref 65–99)
HCT VFR BLD AUTO: 43.4 % (ref 34.8–46.1)
HDLC SERPL-MCNC: 61 MG/DL
HGB BLD-MCNC: 13.8 G/DL (ref 11.5–15.4)
IMM GRANULOCYTES # BLD AUTO: 0.01 THOUSAND/UL (ref 0–0.2)
IMM GRANULOCYTES NFR BLD AUTO: 0 % (ref 0–2)
LDLC SERPL CALC-MCNC: 105 MG/DL (ref 0–100)
LYMPHOCYTES # BLD AUTO: 1.82 THOUSANDS/ΜL (ref 0.6–4.47)
LYMPHOCYTES NFR BLD AUTO: 39 % (ref 14–44)
MCH RBC QN AUTO: 29.9 PG (ref 26.8–34.3)
MCHC RBC AUTO-ENTMCNC: 31.8 G/DL (ref 31.4–37.4)
MCV RBC AUTO: 94 FL (ref 82–98)
MONOCYTES # BLD AUTO: 0.49 THOUSAND/ΜL (ref 0.17–1.22)
MONOCYTES NFR BLD AUTO: 11 % (ref 4–12)
NEUTROPHILS # BLD AUTO: 2.17 THOUSANDS/ΜL (ref 1.85–7.62)
NEUTS SEG NFR BLD AUTO: 47 % (ref 43–75)
NONHDLC SERPL-MCNC: 117 MG/DL
NRBC BLD AUTO-RTO: 0 /100 WBCS
PLATELET # BLD AUTO: 229 THOUSANDS/UL (ref 149–390)
PMV BLD AUTO: 11.8 FL (ref 8.9–12.7)
POTASSIUM SERPL-SCNC: 4.6 MMOL/L (ref 3.5–5.3)
PROT SERPL-MCNC: 7.7 G/DL (ref 6.4–8.2)
RBC # BLD AUTO: 4.61 MILLION/UL (ref 3.81–5.12)
SODIUM SERPL-SCNC: 139 MMOL/L (ref 136–145)
T4 FREE SERPL-MCNC: 0.96 NG/DL (ref 0.76–1.46)
TRIGL SERPL-MCNC: 59 MG/DL
TSH SERPL DL<=0.05 MIU/L-ACNC: 4.94 UIU/ML (ref 0.36–3.74)
WBC # BLD AUTO: 4.66 THOUSAND/UL (ref 4.31–10.16)

## 2020-10-16 PROCEDURE — 84439 ASSAY OF FREE THYROXINE: CPT | Performed by: FAMILY MEDICINE

## 2020-10-16 PROCEDURE — 80061 LIPID PANEL: CPT | Performed by: DERMATOLOGY

## 2020-10-16 PROCEDURE — 85025 COMPLETE CBC W/AUTO DIFF WBC: CPT | Performed by: DERMATOLOGY

## 2020-10-16 PROCEDURE — 36415 COLL VENOUS BLD VENIPUNCTURE: CPT | Performed by: DERMATOLOGY

## 2020-10-16 PROCEDURE — 80053 COMPREHEN METABOLIC PANEL: CPT | Performed by: DERMATOLOGY

## 2020-10-16 PROCEDURE — 84443 ASSAY THYROID STIM HORMONE: CPT | Performed by: FAMILY MEDICINE

## 2020-11-05 DIAGNOSIS — L70.0 ACNE VULGARIS: Primary | ICD-10-CM

## 2020-11-23 ENCOUNTER — NURSE TRIAGE (OUTPATIENT)
Dept: OTHER | Facility: OTHER | Age: 25
End: 2020-11-23

## 2020-11-23 DIAGNOSIS — Z20.828 EXPOSURE TO SARS-ASSOCIATED CORONAVIRUS: Primary | ICD-10-CM

## 2020-11-25 DIAGNOSIS — Z20.828 EXPOSURE TO SARS-ASSOCIATED CORONAVIRUS: ICD-10-CM

## 2020-11-25 PROCEDURE — U0003 INFECTIOUS AGENT DETECTION BY NUCLEIC ACID (DNA OR RNA); SEVERE ACUTE RESPIRATORY SYNDROME CORONAVIRUS 2 (SARS-COV-2) (CORONAVIRUS DISEASE [COVID-19]), AMPLIFIED PROBE TECHNIQUE, MAKING USE OF HIGH THROUGHPUT TECHNOLOGIES AS DESCRIBED BY CMS-2020-01-R: HCPCS | Performed by: FAMILY MEDICINE

## 2020-11-26 LAB — SARS-COV-2 RNA SPEC QL NAA+PROBE: NOT DETECTED

## 2020-12-15 DIAGNOSIS — Z20.822 ENCOUNTER FOR SCREENING LABORATORY TESTING FOR COVID-19 VIRUS: ICD-10-CM

## 2020-12-15 DIAGNOSIS — Z20.822 ENCOUNTER FOR SCREENING LABORATORY TESTING FOR COVID-19 VIRUS: Primary | ICD-10-CM

## 2020-12-15 PROCEDURE — 87637 SARSCOV2&INF A&B&RSV AMP PRB: CPT | Performed by: DERMATOLOGY

## 2020-12-18 LAB
FLUAV RNA NPH QL NAA+PROBE: NOT DETECTED
FLUBV RNA NPH QL NAA+PROBE: NOT DETECTED
RSV RNA NPH QL NAA+PROBE: NOT DETECTED
SARS-COV-2 RNA NPH QL NAA+PROBE: NOT DETECTED

## 2021-01-26 DIAGNOSIS — L70.0 ACNE VULGARIS: Primary | ICD-10-CM

## 2021-01-26 RX ORDER — CLINDAMYCIN PHOSPHATE 10 UG/ML
LOTION TOPICAL
Qty: 60 ML | Refills: 11 | Status: SHIPPED | OUTPATIENT
Start: 2021-01-26 | End: 2022-01-20

## 2021-01-26 RX ORDER — DOXYCYCLINE HYCLATE 100 MG/1
100 CAPSULE ORAL EVERY 12 HOURS SCHEDULED
Qty: 60 CAPSULE | Refills: 2 | Status: SHIPPED | OUTPATIENT
Start: 2021-01-26 | End: 2021-04-26

## 2021-04-01 ENCOUNTER — TRANSCRIBE ORDERS (OUTPATIENT)
Dept: ADMINISTRATIVE | Facility: HOSPITAL | Age: 26
End: 2021-04-01

## 2021-04-01 ENCOUNTER — APPOINTMENT (OUTPATIENT)
Dept: LAB | Facility: MEDICAL CENTER | Age: 26
End: 2021-04-01
Payer: COMMERCIAL

## 2021-04-01 DIAGNOSIS — E06.0 ACUTE THYROIDITIS: ICD-10-CM

## 2021-04-01 DIAGNOSIS — E06.0 ACUTE THYROIDITIS: Primary | ICD-10-CM

## 2021-04-01 DIAGNOSIS — J02.9 ACUTE PHARYNGITIS, UNSPECIFIED ETIOLOGY: Primary | ICD-10-CM

## 2021-04-01 DIAGNOSIS — J02.9 ACUTE PHARYNGITIS, UNSPECIFIED ETIOLOGY: ICD-10-CM

## 2021-04-01 LAB
BASOPHILS # BLD AUTO: 0.01 THOUSANDS/ΜL (ref 0–0.1)
BASOPHILS NFR BLD AUTO: 0 % (ref 0–1)
EOSINOPHIL # BLD AUTO: 0.24 THOUSAND/ΜL (ref 0–0.61)
EOSINOPHIL NFR BLD AUTO: 3 % (ref 0–6)
ERYTHROCYTE [DISTWIDTH] IN BLOOD BY AUTOMATED COUNT: 13 % (ref 11.6–15.1)
HCT VFR BLD AUTO: 42.9 % (ref 34.8–46.1)
HGB BLD-MCNC: 13.4 G/DL (ref 11.5–15.4)
IMM GRANULOCYTES # BLD AUTO: 0.02 THOUSAND/UL (ref 0–0.2)
IMM GRANULOCYTES NFR BLD AUTO: 0 % (ref 0–2)
LYMPHOCYTES # BLD AUTO: 2.2 THOUSANDS/ΜL (ref 0.6–4.47)
LYMPHOCYTES NFR BLD AUTO: 31 % (ref 14–44)
MCH RBC QN AUTO: 29.5 PG (ref 26.8–34.3)
MCHC RBC AUTO-ENTMCNC: 31.2 G/DL (ref 31.4–37.4)
MCV RBC AUTO: 95 FL (ref 82–98)
MONOCYTES # BLD AUTO: 0.65 THOUSAND/ΜL (ref 0.17–1.22)
MONOCYTES NFR BLD AUTO: 9 % (ref 4–12)
NEUTROPHILS # BLD AUTO: 3.94 THOUSANDS/ΜL (ref 1.85–7.62)
NEUTS SEG NFR BLD AUTO: 57 % (ref 43–75)
NRBC BLD AUTO-RTO: 0 /100 WBCS
PLATELET # BLD AUTO: 276 THOUSANDS/UL (ref 149–390)
PMV BLD AUTO: 11.6 FL (ref 8.9–12.7)
RBC # BLD AUTO: 4.54 MILLION/UL (ref 3.81–5.12)
T3FREE SERPL-MCNC: 2.72 PG/ML (ref 2.3–4.2)
T4 FREE SERPL-MCNC: 0.8 NG/DL (ref 0.76–1.46)
TSH SERPL DL<=0.05 MIU/L-ACNC: 8.49 UIU/ML (ref 0.36–3.74)
WBC # BLD AUTO: 7.06 THOUSAND/UL (ref 4.31–10.16)

## 2021-04-01 PROCEDURE — 86308 HETEROPHILE ANTIBODY SCREEN: CPT

## 2021-04-01 PROCEDURE — 36415 COLL VENOUS BLD VENIPUNCTURE: CPT | Performed by: FAMILY MEDICINE

## 2021-04-01 PROCEDURE — 85025 COMPLETE CBC W/AUTO DIFF WBC: CPT | Performed by: FAMILY MEDICINE

## 2021-04-01 PROCEDURE — 84481 FREE ASSAY (FT-3): CPT

## 2021-04-01 PROCEDURE — 84443 ASSAY THYROID STIM HORMONE: CPT

## 2021-04-01 PROCEDURE — 84439 ASSAY OF FREE THYROXINE: CPT

## 2021-04-02 LAB — HETEROPH AB SER QL: NEGATIVE

## 2021-04-04 ENCOUNTER — HOSPITAL ENCOUNTER (OUTPATIENT)
Dept: ULTRASOUND IMAGING | Facility: HOSPITAL | Age: 26
Discharge: HOME | End: 2021-04-04
Payer: COMMERCIAL

## 2021-04-04 DIAGNOSIS — E06.0 ACUTE THYROIDITIS: ICD-10-CM

## 2021-04-04 PROCEDURE — 76536 US EXAM OF HEAD AND NECK: CPT

## 2021-06-16 ENCOUNTER — COSMETIC (OUTPATIENT)
Dept: DERMATOLOGY | Facility: CLINIC | Age: 26
End: 2021-06-16

## 2021-06-16 DIAGNOSIS — L68.9 EXCESSIVE HAIR GROWTH: Primary | ICD-10-CM

## 2021-06-16 PROCEDURE — NC001 PR NO CHARGE: Performed by: DERMATOLOGY

## 2021-06-22 DIAGNOSIS — L74.510 AXILLARY HYPERHIDROSIS: Primary | ICD-10-CM

## 2021-06-22 RX ORDER — GLYCOPYRRONIUM 2.4 G/100G
CLOTH TOPICAL
Qty: 30 EACH | Refills: 0 | Status: SHIPPED | OUTPATIENT
Start: 2021-06-22 | End: 2022-02-03

## 2021-06-25 NOTE — PROGRESS NOTES
Cosmetic visit  Test treatment  Do not charge/bill  LASER HAIR REMOVAL to axilla with Gentle Max Pro   Treatment # 2    755 nm Cole  Spot size: 18 mm  Fluence 12 J/cm2  Pulse duration 3 ms  1 Hz  DCD: 40/30/-    The skin was cleaned and shaved prior to treatment  Patient tolerated the procedure well with no immediate complications  Post care instructions given to patient in writing  Laser safety protective eye wear worn by the patient and all personnel in the treatment room during the procedure to reduce the chance of damage to the eye       Next treatment in 4 weeks

## 2021-07-08 ENCOUNTER — TELEPHONE (OUTPATIENT)
Dept: DERMATOLOGY | Facility: CLINIC | Age: 26
End: 2021-07-08

## 2021-07-08 NOTE — TELEPHONE ENCOUNTER
Prior authorization denial of obrexza pads scanned and in system, cover sheet states "unable to review this request for pa as there is a clinical denial on file with duplicate information and to review the decision letter sent to office for denial reason and next steps"  Thank you!

## 2021-08-17 VITALS
SYSTOLIC BLOOD PRESSURE: 92 MMHG | DIASTOLIC BLOOD PRESSURE: 60 MMHG | BODY MASS INDEX: 18.8 KG/M2 | HEART RATE: 66 BPM | WEIGHT: 124.08 LBS | HEIGHT: 68 IN

## 2021-09-13 ENCOUNTER — APPOINTMENT (OUTPATIENT)
Dept: LAB | Facility: MEDICAL CENTER | Age: 26
End: 2021-09-13

## 2021-12-13 DIAGNOSIS — Z11.52 ENCOUNTER FOR SCREENING FOR COVID-19: Primary | ICD-10-CM

## 2022-01-03 PROCEDURE — U0005 INFEC AGEN DETEC AMPLI PROBE: HCPCS | Performed by: DERMATOLOGY

## 2022-01-03 PROCEDURE — U0003 INFECTIOUS AGENT DETECTION BY NUCLEIC ACID (DNA OR RNA); SEVERE ACUTE RESPIRATORY SYNDROME CORONAVIRUS 2 (SARS-COV-2) (CORONAVIRUS DISEASE [COVID-19]), AMPLIFIED PROBE TECHNIQUE, MAKING USE OF HIGH THROUGHPUT TECHNOLOGIES AS DESCRIBED BY CMS-2020-01-R: HCPCS | Performed by: DERMATOLOGY

## 2022-01-20 DIAGNOSIS — L21.9 SEBORRHEIC DERMATITIS: Primary | ICD-10-CM

## 2022-01-20 RX ORDER — KETOCONAZOLE 20 MG/ML
1 SHAMPOO TOPICAL DAILY
Qty: 120 ML | Refills: 11 | Status: SHIPPED | OUTPATIENT
Start: 2022-01-20 | End: 2022-07-27

## 2022-02-03 ENCOUNTER — OFFICE VISIT (OUTPATIENT)
Dept: DERMATOLOGY | Facility: CLINIC | Age: 27
End: 2022-02-03
Payer: COMMERCIAL

## 2022-02-03 VITALS — BODY MASS INDEX: 18.94 KG/M2 | WEIGHT: 125 LBS | HEIGHT: 68 IN | TEMPERATURE: 97.9 F

## 2022-02-03 DIAGNOSIS — L81.4 LENTIGO: ICD-10-CM

## 2022-02-03 DIAGNOSIS — L85.3 XEROSIS OF SKIN: ICD-10-CM

## 2022-02-03 DIAGNOSIS — D22.9 MULTIPLE MELANOCYTIC NEVI: Primary | ICD-10-CM

## 2022-02-03 DIAGNOSIS — L20.9 ATOPIC DERMATITIS, UNSPECIFIED TYPE: ICD-10-CM

## 2022-02-03 DIAGNOSIS — L21.9 SEBORRHEIC DERMATITIS: ICD-10-CM

## 2022-02-03 PROCEDURE — 99214 OFFICE O/P EST MOD 30 MIN: CPT | Performed by: DERMATOLOGY

## 2022-02-03 RX ORDER — FLUOCINONIDE TOPICAL SOLUTION USP, 0.05% 0.5 MG/ML
SOLUTION TOPICAL 2 TIMES DAILY
Qty: 60 ML | Refills: 2 | Status: SHIPPED | OUTPATIENT
Start: 2022-02-03 | End: 2022-03-22

## 2022-02-03 NOTE — PROGRESS NOTES
[facility] Follow Up Note    Patient Name: Wellington Randall  Encounter Date: 2/3/2022    Today's Chief Concerns:  Ema Robertson Concern #1:  Full body skin exam   Concern #2:     Concern #3:      Current Medications:    Current Outpatient Medications:     albuterol (PROAIR HFA) 90 mcg/act inhaler, ProAir HFA 90 mcg/actuation aerosol inhaler, Disp: , Rfl:     Cholecalciferol (VITAMIN D3) 125 MCG (5000 UT) CAPS, Take 5,000 Units by mouth daily, Disp: , Rfl:     ketoconazole (NIZORAL) 2 % shampoo, Apply 1 application topically in the morning, Disp: 120 mL, Rfl: 11    levothyroxine 50 mcg tablet, Take 50 mcg by mouth daily, Disp: , Rfl:     tazarotene (TAZORAC) 0 1 % gel, Apply topically daily at bedtime (Patient taking differently: Apply topically as needed  ), Disp: 100 g, Rfl: 3    Glycopyrronium Tosylate (Qbrexza) 2 4 % PADS, Use to armpits once a day in the morning (Patient not taking: Reported on 2/3/2022 ), Disp: 30 each, Rfl: 0    NIFEdipine (PROCARDIA) 10 mg capsule, Take 1 capsule (10 mg total) by mouth 2 (two) times a day Start by taking one pill daily for 1 week and if well tolerated increase to 1 pill twice daily (Patient not taking: Reported on 6/25/2020), Disp: 90 capsule, Rfl: 2    CONSTITUTIONAL:   Vitals:    02/03/22 1350   Temp: 97 9 °F (36 6 °C)   Weight: 56 7 kg (125 lb)   Height: 5' 8" (1 727 m)         Specific Alerts:    Have you been seen by a [facility] in the last 3 years? YES    Are you pregnant or planning to become pregnant? YES    Are you currently or planning to be nursing or breast feeding? No    No Known Allergies    May we call your Preferred Phone number to discuss your specific medical information? YES    May we leave a detailed message that includes your specific medical information? YES    Have you traveled outside of the [facility] in the past 3 months? No    Do you currently have a pacemaker or defibrillator?  No    Do you have any artificial heart valves, joints, plates, screws, rods, stents, pins, etc? No   - If Yes, were any placed within the last 2 years? Do you require any medications prior to a surgical procedure? No   - If Yes, for which procedure? - If Yes, what medications to you require? Are you taking any medications that cause you to bleed more easily ("blood thinners") No    Have you ever experienced a rapid heartbeat with epinephrine? No    Have you ever been treated with "gold" (gold sodium thiomalate) therapy? No    [facility] can help with wrinkles, "laugh lines," facial volume loss, "double chin," "love handles," age spots, and more  Are you interested in learning today about some of the skin enhancement procedures that we offer? (If Yes, please provide more detail) No    Review of Systems:  Have you recently had or currently have any of the following?     · Fever or chills: No  · Night Sweats: No  · Headaches: No  · Weight Gain: No  · Weight Loss: No  · Blurry Vision: No  · Nausea: No  · Vomiting: No  · Diarrhea: No  · Blood in Stool: No  · Abdominal Pain: No  · Itchy Skin: No  · Painful Joints: No  · Swollen Joints: No  · Muscle Pain: No  · Irregular Mole: No  · Sun Burn: No  · Dry Skin: No  · Skin Color Changes: No  · Scar or Keloid: No  · Cold Sores/Fever Blisters: No  · Bacterial Infections/MRSA: No  · Anxiety: No  · Depression: No  · Suicidal or Homicidal Thoughts: No      PSYCH: Normal mood and affect  EYES: Normal conjunctiva  ENT: Normal lips and oral mucosa  CARDIOVASCULAR: No edema  RESPIRATORY: Normal respirations  HEME/LYMPH/IMMUNO:  No regional lymphadenopathy except as noted below in ASSESSMENT AND PLAN BY DIAGNOSIS    FULL ORGAN SYSTEM SKIN EXAM (SKIN)   Hair, Scalp, Ears, Face Normal except as noted below in Assessment   Neck, Cervical Chain Nodes Normal except as noted below in Assessment   Right Arm/Hand/Fingers Normal except as noted below in Assessment   Left Arm/Hand/Fingers Normal except as noted below in Assessment   Chest/Axillae Viewed areas Normal except as noted below in Assessment   Abdomen, Umbilicus Normal except as noted below in Assessment   Back/Spine Normal except as noted below in Assessment   Groin/Genitalia/Buttocks    Right Leg, Foot, Toes Normal except as noted below in Assessment   Left Leg, Foot, Toes Normal except as noted below in Assessment     MELANOCYTIC NEVI ("Moles")    Physical Exam:   Anatomic Location Affected:   Mostly on sun-exposed areas of the trunk and extremities   Morphological Description:  Scattered, 1-4mm round to ovoid, symmetrical-appearing, even bordered, skin colored to dark brown macules/papules, mostly in sun-exposed areas   Pertinent Positives:   Pertinent Negatives: Additional History of Present Condition:  Found on exam today  Assessment and Plan:  Based on a thorough discussion of this condition and the management approach to it (including a comprehensive discussion of the known risks, side effects and potential benefits of treatment), the patient (family) agrees to implement the following specific plan:   When outside we recommend using a wide brim hat, sunglasses, long sleeve and pants, sunscreen with SPF 27+ with reapplication every 2 hours, or SPF specific clothing    Benign, reassured   Annual skin check     Melanocytic Nevi  Melanocytic nevi ("moles") are tan or brown, raised or flat areas of the skin which have an increased number of melanocytes  Melanocytes are the cells in our body which make pigment and account for skin color  Some moles are present at birth (I e , "congenital nevi"), while others come up later in life (i e , "acquired nevi")  The sun can stimulate the body to make more moles  Sunburns are not the only thing that triggers more moles  Chronic sun exposure can do it too  Clinically distinguishing a healthy mole from melanoma may be difficult, even for experienced dermatologists   The "ABCDE's" of moles have been suggested as a means of helping to alert a person to a suspicious mole and the possible increased risk of melanoma  The suggestions for raising alert are as follows:    Asymmetry: Healthy moles tend to be symmetric, while melanomas are often asymmetric  Asymmetry means if you draw a line through the mole, the two halves do not match in color, size, shape, or surface texture  Asymmetry can be a result of rapid enlargement of a mole, the development of a raised area on a previously flat lesion, scaling, ulceration, bleeding or scabbing within the mole  Any mole that starts to demonstrate "asymmetry" should be examined promptly by a board certified dermatologist      Border: Healthy moles tend to have discrete, even borders  The border of a melanoma often blends into the normal skin and does not sharply delineate the mole from normal skin  Any mole that starts to demonstrate "uneven borders" should be examined promptly by a board certified dermatologist      Color: Healthy moles tend to be one color throughout  Melanomas tend to be made up of different colors ranging from dark black, blue, white, or red  Any mole that demonstrates a color change should be examined promptly by a board certified dermatologist      Diameter: Healthy moles tend to be smaller than 0 6 cm in size; an exception are "congenital nevi" that can be larger  Melanomas tend to grow and can often be greater than 0 6 cm (1/4 of an inch, or the size of a pencil eraser)  This is only a guideline, and many normal moles may be larger than 0 6 cm without being unhealthy  Any mole that starts to change in size (small to bigger or bigger to smaller) should be examined promptly by a board certified dermatologist      Evolving: Healthy moles tend to "stay the same "  Melanomas may often show signs of change or evolution such as a change in size, shape, color, or elevation    Any mole that starts to itch, bleed, crust, burn, hurt, or ulcerate or demonstrate a change or evolution should be examined promptly by a board certified dermatologist         LENTIGO    Physical Exam:   Anatomic Location Affected:  Bilateral extremities    Morphological Description:  Light brown macules   Pertinent Positives:   Pertinent Negatives: Additional History of Present Condition:  Found on exam     Assessment and Plan:  Based on a thorough discussion of this condition and the management approach to it (including a comprehensive discussion of the known risks, side effects and potential benefits of treatment), the patient (family) agrees to implement the following specific plan:   When outside we recommend using a wide brim hat, sunglasses, long sleeve and pants, sunscreen with SPF 62+ with reapplication every 2 hours, or SPF specific clothing       What is a lentigo? A lentigo is a pigmented flat or slightly raised lesion with a clearly defined edge  Unlike an ephelis (freckle), it does not fade in the winter months  There are several kinds of lentigo  The name lentigo originally referred to its appearance resembling a small lentil  The plural of lentigo is lentigines, although lentigos is also in common use  Who gets lentigines? Lentigines can affect males and females of all ages and races  Solar lentigines are especially prevalent in fair skinned adults  Lentigines associated with syndromes are present at birth or arise during childhood  What causes lentigines? Common forms of lentigo are due to exposure to ultraviolet radiation:   Sun damage including sunburn    Indoor tanning    Phototherapy, especially photochemotherapy (PUVA)    Ionizing radiation, eg radiation therapy, can also cause lentigines  Several familial syndromes associated with widespread lentigines originate from mutations in Marck-MAP kinase, mTOR signaling and PTEN pathways  What is the treatment for lentigines? Most lentigines are left alone   Attempts to lighten them may not be successful  The following approaches are used:   SPF 50+ broad-spectrum sunscreen    Hydroquinone bleaching cream    Alpha hydroxy acids    Vitamin C    Retinoids    Azelaic acid    Chemical peels  Individual lesions can be permanently removed using:   Cryotherapy    Intense pulsed light    Pigment lasers    How can lentigines be prevented? Lentigines associated with exposure ultraviolet radiation can be prevented by very careful sun protection  Clothing is more successful at preventing new lentigines than are sunscreens  What is the outlook for lentigines? Lentigines usually persist  They may increase in number with age and sun exposure  Some in sun-protected sites may fade and disappear  XEROSIS ("DRY SKIN")    Physical Exam:   Anatomic Location Affected:  diffuse   Morphological Description:  xerosis   Pertinent Positives:   Pertinent Negatives: Additional History of Present Condition:      Assessment and Plan:  Based on a thorough discussion of this condition and the management approach to it (including a comprehensive discussion of the known risks, side effects and potential benefits of treatment), the patient (family) agrees to implement the following specific plan:   Use moisturizer like Eucerin,Cerave or Aveeno Cream 3 times a day for the dry skin               Dry skin refers to skin that feels dry to touch  Dry skin has a dull surface with a rough, scaly quality  The skin is less pliable and cracked  When dryness is severe, the skin may become inflamed and fissured  Although any body site can be dry, dry skin tends to affect the shins more than any other site  Dry skin is lacking moisture in the outer horny cell layer (stratum corneum) and this results in cracks in the skin surface  Dry skin is also called xerosis, xeroderma or asteatosis (lack of fat)  It can affect males and females of all ages   There is some racial variability in water and lipid content of the skin   Dry skin that starts in early childhood may be one of about 20 types of ichthyosis (fish-scale skin)  There is often a family history of dry skin   Dry skin is commonly seen in people with atopic dermatitis   Nearly everyone > 60 years has dry skin  Dry skin that begins later may be seen in people with certain diseases and conditions   Postmenopausal women   Hypothyroidism   Chronic renal disease    Malnutrition and weight loss    Subclinical dermatitis    Treatment with certain drugs such as oral retinoids, diuretics and epidermal growth factor receptor inhibitors      What is the treatment for dry skin? The mainstay of treatment of dry skin and ichthyosis is moisturisers/emollients  They should be applied liberally and often enough to:   Reduce itch    Improve the barrier function    Prevent entry of irritants, bacteria    Reduce transepidermal water loss  How can dry skin be prevented? Eliminate aggravating factors:   Reduce the frequency of bathing   A humidifier in winter and air conditioner in summer    Compare having a short shower with a prolonged soak in a bath   Use lukewarm, not hot, water   Replace standard soap with a substitute such as a synthetic detergent cleanser, water-miscible emollient, bath oil, anti-pruritic tar oil, colloidal oatmeal etc     Apply an emollient liberally and often, particularly shortly after bathing, and when itchy  The drier the skin, the thicker this should be, especially on the hands  What is the outlook for dry skin? A tendency to dry skin may persist life-long, or it may improve once contributing factors are controlled  SEBORRHEIC DERMATITIS    Physical Exam:   Anatomic Location Affected:  Scalp   Morphological Description:  Mild Scale   Pertinent Positives:   Pertinent Negatives:     Additional History of Present Condition:  Patient has tried ketoconazole 2% shampoo, T Lizzeth, Selsun Blue [facility] Head & Margaret    Assessment and Plan:  Based on a thorough discussion of this condition and the management approach to it (including a comprehensive discussion of the known risks, side effects and potential benefits of treatment), the patient (family) agrees to implement the following specific plan:   Recommend continuing to use Ketoconazole 2% Shampoo   Recommend starting Lidex Solution: Apply topically twice a day as needed to scalp       Seborrheic Dermatitis   Seborrheic dermatitis is a common, chronic or relapsing form of eczema/dermatitis that mainly affects the sebaceous, gland-rich regions of the scalp, face, and trunk  There are infantile and adult forms of seborrhoeic dermatitis  It is sometimes associated with psoriasis and, in that clinical scenario, may be referred to as "sebo-psoriasis "  Seborrheic dermatitis is also known as "seborrheic eczema "  Dandruff (also called "pityriasis capitis") is an uninflamed form of seborrhoeic dermatitis  Dandruff presents as bran-like scaly patches scattered within hair-bearing areas of the scalp  In an infant, this condition may be referred to as "cradle cap "  The cause of seborrheic dermatitis is not completely understood  It is associated with proliferation of various species of the skin commensal Malassezia, in its yeast (non-pathogenic) form  Its metabolites (such as the fatty acids oleic acid, malssezin, and indole-3-carbaldehyde) may cause an inflammatory reaction  Differences in skin barrier lipid content and function may account for individual presentations  Infantile Seborrheic Dermatitis  Infantile seborrheic dermatitis affects babies under the age of 1 months and usually resolves by 1012 months of age  Infantile seborrheic dermatitis causes "cradle cap" (diffuse, greasy scaling on scalp)  The rash may spread to affect armpit and groin folds (a type of "napkin dermatitis")  There may be associated salmon-pink colored patches that may flake or peel  The rash in this case is usually not especially itchy, so the baby often appears undisturbed by the rash, even when more generalized  Adult Seborrheic Dermatitis  Adult seborrheic dermatitis tends to begin in late adolescence; prevalence is greatest in young adults and in the elderly  It is more common in males than in females  The following factors are sometimes associated with severe adult seborrheic dermatitis:   Oily skin   Familial tendency to seborrhoeic dermatitis or a family history of psoriasis   Immunosuppression: organ transplant recipient, human immunodeficiency virus (HIV) infection and patients with lymphoma   Neurological and psychiatric diseases: Parkinson disease, tardive dyskinesia, depression, epilepsy, facial nerve palsy, spinal cord injury and congenital disorders such as Down syndrome   Treatment for psoriasis with psoralen and ultraviolet A (PUVA) therapy   Lack of sleep   Stressful events  In adults, seborrheic dermatitis may typically affect the scalp, face (creases around the nose, behind ears, within eyebrows) and upper trunk  Typical clinical features include:   Winter flares, improving in summer following sun exposure   Minimal itch most of the time   Combination oily and dry mid-facial skin   Ill-defined localized scaly patches or diffuse scale in the scalp   Blepharitis; scaly red eyelid margins   [place]-pink, thin, scaly, and ill-defined plaques in skin folds on both sides of the face   Petal or ring-shaped flaky patches on hair-line and on anterior chest   Rash in armpits, under the breasts, in the groin folds and genital creases   Superficial folliculitis (inflamed hair follicles) on cheeks and upper trunk    Seborrheic dermatitis is diagnosed by its clinical appearance and behavior  Skin biopsy may be helpful but is rarely necessary to make this diagnosis      ATOPIC DERMATITIS     Physical Exam:   Anatomic Location Affected:  Eyelids   Morphological Description:  Pink scaly patches   Severity: mild   Pertinent Positives:   Pertinent Negatives: Additional History of Present Condition:  Patient has not tried anything in the past     Assessment and Plan:  Based on a thorough discussion of this condition and the management approach to it (including a comprehensive discussion of the known risks, side effects and potential benefits of treatment), the patient (family) agrees to implement the following specific plan:   Recommend applying plain Vaseline twice a day to eyelids  Assessment and Plan:   Atopic Dermatitis is a chronic, itchy skin condition that is very common in children but may occur at any age  It is also known as eczema or atopic eczema   It is the most common form of dermatitis  Atopic dermatitis usually occurs in people who have an atopic tendency    This means they may develop any or all of these closely linked conditions: Atopic dermatitis, asthma, hay fever (allergic rhinitis), eosinophilic esophagitis, and gastroenteritis  Often these conditions run within families with a parent, child or sibling also affected  A family history of asthma, eczema or hay fever is particularly useful in diagnosing atopic dermatitis in infants  Atopic dermatitis arises because of a complex interaction of genetic and environmental factors  These include defects in skin barrier function making the skin more susceptible to irritation by soap and other contact irritants, the weather, temperature and non-specific triggers  There is also an element of immune system dysregulation that is often present  By definition, it is chronic and has a "waxing-waning" nature; flares should be expected but with good education and treatment strategies can be minimized  Some specific tips we discussed:   Dry skin care     Usingonly mild cleansers (hypoallergenic and without fragrances) and fragrance free detergent (not unscented products which contain a masking agent); we discussed avoiding irritants/fragranced products   The importance of regular application of moisturizers daily (at least 3 times a day)   The known and theoretical side effects of steroids at length, including but not limited to atrophy of skin and increased pressure in eye (glaucoma) and clouding of the eye's lens (cataracts) if used in or around the eye for extended durations   The specific over-the-counter interventions and medications   Side effects, risks and benefits of topical and oral medications discussed   After lengthy discussion of etiology and treatment options, we decided to implement the following personalized treatment plan:      EDUCATION AS INTERVENTION! WHAT IS ATOPIC DERMATITIS? Atopic dermatitis (also called eczema) is a condition of the skin where the skin is dry, red, and itchy  The main function of the skin is to provide a barrier from the environment and is also the first defense of the immune system  In atopic dermatitis the skin barrier is decreased or disrupted, and the skin is easily irritated  As a result, moisture escapes the skin more easily, and environmental allergens and microbes can enter the skin more easily  Consequently, the skin's immune system is altered  If there are increased allergic type cells in the skin, the skin may become red and hyper-excitable   This leads to itching and a subsequent rash  WHY DO PEOPLE GET ATOPIC DERMATITIS? There is no single answer because many factors are involved  It is likely a combination of genetic makeup and environmental triggers and/or exposures  Excessive drying or sweating of the skin, Irritating soaps, dust mites, and pet dander are some of the more common triggers  There is no blood test that can be done to confirm this diagnosis  The history and appearance of the skin is usually sufficient for a diagnosis   However, in some cases if the rash does not fit with the history or respond appropriately to treatment, a skin biopsy may be helpful  Many children do outgrow atopic dermatitis or get better; however, many continue to have sensitive skin into adulthood  Asthma and hay fever are often seen in many patients with atopic dermatitis; however, asthma flares do not necessarily occur at the same time as skin flares  PREVENTING FLARES OF ATOPIC DERMATITIS  The first step is to maintain the skin's barrier function  Keep the skin well moisturized  Avoid irritants and triggers  Use prescribed medicine when there are red or rough areas to help the skin to return to normal as quickly as possible  Try to limit scratching  If you keep the skin well moisturized, and avoid coming in contact with things you know irritate your child's skin, there will be less flares  However, some flares of atopic dermatitis are beyond your control  You should work with your health care provider to come up with a plan that minimizes flares while minimizing long term use of medications that suppress the immune system  WHAT ARE SOME OF THE TRIGGERS? Triggers are different for different people  The most common triggers are:   Heat and sweat for some individuals, cold weather for others   House dust mites, pet fur   Wool; synthetic fabrics like nylon; dyed fabrics   Tobacco smoke    Fragrances in: shampoos, soaps, lotions, laundry detergents, fabric softeners   Saliva or prolonged exposure to water  WHAT ABOUT FOOD ALLERGIES? This is a very controversial topic, as many believe that food allergies are responsible for skin flares  In some cases, specific foods may cause worsening of atopic dermatitis; however this occurs in a minority of cases and usually happens within a few hours of ingestion  While food allergy is more common in children with eczema, foods are specific triggers for flares in only a small percentage of children    If you notice that the skin flares after certain foods you can see if eliminating one food at time makes a difference, as long as your child can still enjoy a well-balanced diet  There are blood (RAST) and skin (PRICK) tests that can check for allergies, but they are often positive in children who are not truly allergic  Therefore it is important that you work with your allergist and dermatologist to determine which foods are relevant and causing true symptoms  Extreme food elimination diets without the guidance of your doctor, which have become more popular in recent years, may even result in worsening of the skin rash due to malnutrition and avoidance of essential nutrients  TREATMENT  Treatments are aimed at minimizing exposure to irritating factors and decreasing  the skin inflammation which results in an itchy rash  There are many different treatment options, which depend on your child's rash, its location, and severity  Topical treatments include corticosteroids and steroid-like creams such as Protopic, Elidel, and Eucrisa, which are believed to not thin the skin  Please read the discussions below regarding risks and benefits of all of these creams  Occasionally bacterial or viral infections can occur which flare the skin and require oral and/or topical antibiotics or antivirals  In some cases bleach baths 2-3 times weekly can be helpful to prevent recurrent infection  For severe disease, strong oral medications such as corticosteroids, methotrexate or azathioprine (Imuran) may be needed  These medications require close monitoring and follow-up  You should discuss the risks/ benefits/alternatives of these medications with your health care provider to come up with the best treatment plan for your child  1) Use moisturizer all over the entire body at least THREE TIMES a day  This keeps the skin moisturized to restore the barrier function  Find a cream or ointment that your child likes - this is the most important  The medicines do not work in the bottle    The thicker the moisturizer, generally the better barrier it provides  Ointments often moisturize better than creams; and creams work better than lotions  Lotions are more useful during the summer when thick greasy ointments are uncomfortable  If you put moisturizer on the skin after bathing, while the skin is damp, it is twice as effective  The moisturizer provides a seal holding the water in the skin  You may bathe your child in warm - not hot - water, for short periods of time (no more than 5-10 minutes at a time) once a day if they like  Lightly pat your child dry with a towel and, while the skin is still damp, (within 3 minutes) apply a moisturizer from head to toe  If your child is using a medicated cream, apply it and allow it to absorb completely BEFORE you apply the moisturizer  2) Apply the prescription medication TWICE A DAY to only the red, rough areas on the skin OR AS [place]  Put the medication on your fingers and gently rub it into the areas  Usually the medicine will help an area within a few days time  Try to put the medicine on for two days after you have noticed that the redness is no longer present; this will help the redness from returning  The severity of the rash and the strength and usage of the medication will determine how quickly you see improvement  It is important that you do not overuse steroid creams, and if you notice a thin, shiny appearance to the skin or broken blood vessels, you should stop using the cream and consult your health care provider regarding possible overuse/overthinning of the skin  The face, armpits and groin have particularly thin and sensitive skin and are therefore most at risk for bad results if steroids are over-used in these sites  3) Avoid triggers  Some children have specific things that trigger itching and rashes, while others may have none that can be identified    It may require a little bit of trial and error to see what applies to your child  Also, triggers can change over time for your child  The most common triggers are listed above; start with these  Avoid the use of fabric softeners in the washing machine or dryer sheets (unless they are fragrance-free)  Try to use laundry detergents, soaps and shampoos that are fragrance-free  You may find it helpful to double-rinse your clothes  Some children are sensitive to house dust mites and they may benefit from a plastic mattress wrap  While food allergy is more common in children with eczema, foods are specific triggers for flares in only a small percentage of children  If you notice that the skin flares after certain foods you can see if eliminating one food at time makes a difference, as long as your child can still enjoy a well-balanced diet  4) Consider using a medication like an anti-histamine by mouth to help control the itching  Scratching only makes the skin more reactive and the barrier function even more disrupted  It can cause both children and their parents to lose sleep! There are different types of anti-itch medications  Some cause more drowsiness than others  Both types are acceptable depending on your child and your preference  Start with Benadryl and if that does not work, ask for a prescription antihistamine      5) About the prescription creams:  Corticosteroid creams and ointments (generally things with "-one" or "yyao" on the end of their names): The strength of the cream or ointment depends on the name of the active ingredient  The numbers at the end do not indicate the relative strength  Thus triamcinolone 0 1% ointment, considered a mid-strength corticosteroid, is much stronger than hydrocortisone 1% even though the number following the name is much lower  Topical corticosteroids are very effective in treating atopic dermatitis    When used in the manner prescribed (to rashy areas of skin and for no more than a few weeks at a time to any one area) they are very safe  These are corticosteroids and are anti-inflammatory, not the anabolic steroids like those used illegally by some athletes  Topical non-steroid creams and ointments (immunomodulators): These creams and ointments are also called topical calcineurin inhibitors (TCIs)  These include Protopic ointment and Elidel cream  Crisaborole 2% Avelina Capes) is a prescription ointment that targets an enzyme called PDE4 (phosphodiesterase 4)  It is used on the skin topically to treat mild-to-moderate eczema in adults and children 3years of age and older  In total, these nonsteroidal prescriptions are used to help decrease itching and redness in the skin  They are not as strong as most steroid creams; however, it is believed that they do not thin the skin when overused  They are generally used as second-line medications, though they may be used alone or in conjunction with topical steroids  In sensitive areas such as the face, underarms or groin, they are often recommended  They can sting inflamed skin, but are generally well tolerated once the skin is healing  The FDA placed a black-box warning on both Elidel and Protopic in 2006 based on animal studies using the medications  Some animals developed skin cancer and lymphoma  Subsequently, the FDA released a statement that there is no causal relationship between the two medications and cancer  Because of this concern, there are ongoing studies to evaluate this relationship in humans  So far, there are studies that support the safety of these medications  One showed that the rates of cancer in patient using these medications topically were less than the rates of the general population and another showed that in patient's using the medication over a large area of the body, the levels of the medication in the blood was undetectable      As for Eucrisa, this product is only approved for the topical treatment of mild-to-moderate eczema in patients 3years of age and older; use of the medication in kids younger than 2 is considered off label and has not been formally studied  Burning and stinging are the most commonly reported side effects of this medication  Rarely, this product has been known to cause hives and hypersensitivity reactions; discontinue its use if you develop severe itching, swelling, or redness in the area of application      Scribe Attestation    I,:  Luc Parry am acting as a scribe while in the presence of the attending physician :       I,:  Prince Tee MD personally performed the services described in this documentation    as scribed in my presence :

## 2022-02-03 NOTE — PATIENT INSTRUCTIONS
MELANOCYTIC NEVI ("Moles")    Assessment and Plan:  Based on a thorough discussion of this condition and the management approach to it (including a comprehensive discussion of the known risks, side effects and potential benefits of treatment), the patient (family) agrees to implement the following specific plan:   When outside we recommend using a wide brim hat, sunglasses, long sleeve and pants, sunscreen with SPF 01+ with reapplication every 2 hours, or SPF specific clothing    Benign, reassured   Annual skin check     Melanocytic Nevi  Melanocytic nevi ("moles") are tan or brown, raised or flat areas of the skin which have an increased number of melanocytes  Melanocytes are the cells in our body which make pigment and account for skin color  Some moles are present at birth (I e , "congenital nevi"), while others come up later in life (i e , "acquired nevi")  The sun can stimulate the body to make more moles  Sunburns are not the only thing that triggers more moles  Chronic sun exposure can do it too  Clinically distinguishing a healthy mole from melanoma may be difficult, even for experienced dermatologists  The "ABCDE's" of moles have been suggested as a means of helping to alert a person to a suspicious mole and the possible increased risk of melanoma  The suggestions for raising alert are as follows:    Asymmetry: Healthy moles tend to be symmetric, while melanomas are often asymmetric  Asymmetry means if you draw a line through the mole, the two halves do not match in color, size, shape, or surface texture  Asymmetry can be a result of rapid enlargement of a mole, the development of a raised area on a previously flat lesion, scaling, ulceration, bleeding or scabbing within the mole  Any mole that starts to demonstrate "asymmetry" should be examined promptly by a board certified dermatologist      Border: Healthy moles tend to have discrete, even borders    The border of a melanoma often blends into the normal skin and does not sharply delineate the mole from normal skin  Any mole that starts to demonstrate "uneven borders" should be examined promptly by a board certified dermatologist      Color: Healthy moles tend to be one color throughout  Melanomas tend to be made up of different colors ranging from dark black, blue, white, or red  Any mole that demonstrates a color change should be examined promptly by a board certified dermatologist      Diameter: Healthy moles tend to be smaller than 0 6 cm in size; an exception are "congenital nevi" that can be larger  Melanomas tend to grow and can often be greater than 0 6 cm (1/4 of an inch, or the size of a pencil eraser)  This is only a guideline, and many normal moles may be larger than 0 6 cm without being unhealthy  Any mole that starts to change in size (small to bigger or bigger to smaller) should be examined promptly by a board certified dermatologist      Evolving: Healthy moles tend to "stay the same "  Melanomas may often show signs of change or evolution such as a change in size, shape, color, or elevation  Any mole that starts to itch, bleed, crust, burn, hurt, or ulcerate or demonstrate a change or evolution should be examined promptly by a board certified dermatologist         Nissa Redman and Plan:  Based on a thorough discussion of this condition and the management approach to it (including a comprehensive discussion of the known risks, side effects and potential benefits of treatment), the patient (family) agrees to implement the following specific plan:   When outside we recommend using a wide brim hat, sunglasses, long sleeve and pants, sunscreen with SPF 29+ with reapplication every 2 hours, or SPF specific clothing       What is a lentigo? A lentigo is a pigmented flat or slightly raised lesion with a clearly defined edge  Unlike an ephelis (freckle), it does not fade in the winter months   There are several kinds of lentigo  The name lentigo originally referred to its appearance resembling a small lentil  The plural of lentigo is lentigines, although lentigos is also in common use  Who gets lentigines? Lentigines can affect males and females of all ages and races  Solar lentigines are especially prevalent in fair skinned adults  Lentigines associated with syndromes are present at birth or arise during childhood  What causes lentigines? Common forms of lentigo are due to exposure to ultraviolet radiation:   Sun damage including sunburn    Indoor tanning    Phototherapy, especially photochemotherapy (PUVA)    Ionizing radiation, eg radiation therapy, can also cause lentigines  Several familial syndromes associated with widespread lentigines originate from mutations in Marck-MAP kinase, mTOR signaling and PTEN pathways  What is the treatment for lentigines? Most lentigines are left alone  Attempts to lighten them may not be successful  The following approaches are used:   SPF 50+ broad-spectrum sunscreen    Hydroquinone bleaching cream    Alpha hydroxy acids    Vitamin C    Retinoids    Azelaic acid    Chemical peels  Individual lesions can be permanently removed using:   Cryotherapy    Intense pulsed light    Pigment lasers    How can lentigines be prevented? Lentigines associated with exposure ultraviolet radiation can be prevented by very careful sun protection  Clothing is more successful at preventing new lentigines than are sunscreens  What is the outlook for lentigines? Lentigines usually persist  They may increase in number with age and sun exposure  Some in sun-protected sites may fade and disappear      XEROSIS ("DRY SKIN")    Assessment and Plan:  Based on a thorough discussion of this condition and the management approach to it (including a comprehensive discussion of the known risks, side effects and potential benefits of treatment), the patient (family) agrees to implement the following specific plan:   Use moisturizer like Eucerin,Cerave or Aveeno Cream 3 times a day for the dry skin               Dry skin refers to skin that feels dry to touch  Dry skin has a dull surface with a rough, scaly quality  The skin is less pliable and cracked  When dryness is severe, the skin may become inflamed and fissured  Although any body site can be dry, dry skin tends to affect the shins more than any other site  Dry skin is lacking moisture in the outer horny cell layer (stratum corneum) and this results in cracks in the skin surface  Dry skin is also called xerosis, xeroderma or asteatosis (lack of fat)  It can affect males and females of all ages  There is some racial variability in water and lipid content of the skin   Dry skin that starts in early childhood may be one of about 20 types of ichthyosis (fish-scale skin)  There is often a family history of dry skin   Dry skin is commonly seen in people with atopic dermatitis   Nearly everyone > 60 years has dry skin  Dry skin that begins later may be seen in people with certain diseases and conditions   Postmenopausal women   Hypothyroidism   Chronic renal disease    Malnutrition and weight loss    Subclinical dermatitis    Treatment with certain drugs such as oral retinoids, diuretics and epidermal growth factor receptor inhibitors      What is the treatment for dry skin? The mainstay of treatment of dry skin and ichthyosis is moisturisers/emollients  They should be applied liberally and often enough to:   Reduce itch    Improve the barrier function    Prevent entry of irritants, bacteria    Reduce transepidermal water loss  How can dry skin be prevented? Eliminate aggravating factors:   Reduce the frequency of bathing   A humidifier in winter and air conditioner in summer    Compare having a short shower with a prolonged soak in a bath   Use lukewarm, not hot, water      Replace standard soap with a substitute such as a synthetic detergent cleanser, water-miscible emollient, bath oil, anti-pruritic tar oil, colloidal oatmeal etc     Apply an emollient liberally and often, particularly shortly after bathing, and when itchy  The drier the skin, the thicker this should be, especially on the hands  What is the outlook for dry skin? A tendency to dry skin may persist life-long, or it may improve once contributing factors are controlled  SEBORRHEIC DERMATITIS  Assessment and Plan:  Based on a thorough discussion of this condition and the management approach to it (including a comprehensive discussion of the known risks, side effects and potential benefits of treatment), the patient (family) agrees to implement the following specific plan:   Recommend continuing to use Ketoconazole 2% Shampoo   Recommend starting Lidex Solution: Apply topically twice a day as needed to scalp       Seborrheic Dermatitis   Seborrheic dermatitis is a common, chronic or relapsing form of eczema/dermatitis that mainly affects the sebaceous, gland-rich regions of the scalp, face, and trunk  There are infantile and adult forms of seborrhoeic dermatitis  It is sometimes associated with psoriasis and, in that clinical scenario, may be referred to as "sebo-psoriasis "  Seborrheic dermatitis is also known as "seborrheic eczema "  Dandruff (also called "pityriasis capitis") is an uninflamed form of seborrhoeic dermatitis  Dandruff presents as bran-like scaly patches scattered within hair-bearing areas of the scalp  In an infant, this condition may be referred to as "cradle cap "  The cause of seborrheic dermatitis is not completely understood  It is associated with proliferation of various species of the skin commensal Malassezia, in its yeast (non-pathogenic) form  Its metabolites (such as the fatty acids oleic acid, malssezin, and indole-3-carbaldehyde) may cause an inflammatory reaction   Differences in skin barrier lipid content and function may account for individual presentations  Infantile Seborrheic Dermatitis  Infantile seborrheic dermatitis affects babies under the age of 1 months and usually resolves by 1012 months of age  Infantile seborrheic dermatitis causes "cradle cap" (diffuse, greasy scaling on scalp)  The rash may spread to affect armpit and groin folds (a type of "napkin dermatitis")  There may be associated salmon-pink colored patches that may flake or peel  The rash in this case is usually not especially itchy, so the baby often appears undisturbed by the rash, even when more generalized  Adult Seborrheic Dermatitis  Adult seborrheic dermatitis tends to begin in late adolescence; prevalence is greatest in young adults and in the elderly  It is more common in males than in females  The following factors are sometimes associated with severe adult seborrheic dermatitis:   Oily skin   Familial tendency to seborrhoeic dermatitis or a family history of psoriasis   Immunosuppression: organ transplant recipient, human immunodeficiency virus (HIV) infection and patients with lymphoma   Neurological and psychiatric diseases: Parkinson disease, tardive dyskinesia, depression, epilepsy, facial nerve palsy, spinal cord injury and congenital disorders such as Down syndrome   Treatment for psoriasis with psoralen and ultraviolet A (PUVA) therapy   Lack of sleep   Stressful events  In adults, seborrheic dermatitis may typically affect the scalp, face (creases around the nose, behind ears, within eyebrows) and upper trunk   Typical clinical features include:   Winter flares, improving in summer following sun exposure   Minimal itch most of the time   Combination oily and dry mid-facial skin   Ill-defined localized scaly patches or diffuse scale in the scalp   Blepharitis; scaly red eyelid margins   [place]-pink, thin, scaly, and ill-defined plaques in skin folds on both sides of the face   Petal or ring-shaped flaky patches on hair-line and on anterior chest   Rash in armpits, under the breasts, in the groin folds and genital creases   Superficial folliculitis (inflamed hair follicles) on cheeks and upper trunk    Seborrheic dermatitis is diagnosed by its clinical appearance and behavior  Skin biopsy may be helpful but is rarely necessary to make this diagnosis  ATOPIC DERMATITIS   Assessment and Plan:  Based on a thorough discussion of this condition and the management approach to it (including a comprehensive discussion of the known risks, side effects and potential benefits of treatment), the patient (family) agrees to implement the following specific plan:   Recommend applying plain Vaseline twice a day to eyelids  Assessment and Plan:   Atopic Dermatitis is a chronic, itchy skin condition that is very common in children but may occur at any age  It is also known as eczema or atopic eczema   It is the most common form of dermatitis  Atopic dermatitis usually occurs in people who have an atopic tendency    This means they may develop any or all of these closely linked conditions: Atopic dermatitis, asthma, hay fever (allergic rhinitis), eosinophilic esophagitis, and gastroenteritis  Often these conditions run within families with a parent, child or sibling also affected  A family history of asthma, eczema or hay fever is particularly useful in diagnosing atopic dermatitis in infants  Atopic dermatitis arises because of a complex interaction of genetic and environmental factors  These include defects in skin barrier function making the skin more susceptible to irritation by soap and other contact irritants, the weather, temperature and non-specific triggers  There is also an element of immune system dysregulation that is often present  By definition, it is chronic and has a "waxing-waning" nature; flares should be expected but with good education and treatment strategies can be minimized      Some specific tips we discussed:   Dry skin care   Usingonly mild cleansers (hypoallergenic and without fragrances) and fragrance free detergent (not unscented products which contain a masking agent); we discussed avoiding irritants/fragranced products   The importance of regular application of moisturizers daily (at least 3 times a day)   The known and theoretical side effects of steroids at length, including but not limited to atrophy of skin and increased pressure in eye (glaucoma) and clouding of the eye's lens (cataracts) if used in or around the eye for extended durations   The specific over-the-counter interventions and medications   Side effects, risks and benefits of topical and oral medications discussed     After lengthy discussion of etiology and treatment options, we decided to implement the following personalized treatment plan:

## 2022-02-09 DIAGNOSIS — L20.9 ATOPIC DERMATITIS, UNSPECIFIED TYPE: Primary | ICD-10-CM

## 2022-02-09 RX ORDER — TACROLIMUS 1 MG/G
OINTMENT TOPICAL 2 TIMES DAILY
Qty: 30 G | Refills: 0 | Status: SHIPPED | OUTPATIENT
Start: 2022-02-09 | End: 2022-03-22

## 2022-03-22 ENCOUNTER — OFFICE VISIT (OUTPATIENT)
Dept: OBGYN CLINIC | Facility: CLINIC | Age: 27
End: 2022-03-22
Payer: COMMERCIAL

## 2022-03-22 VITALS
SYSTOLIC BLOOD PRESSURE: 122 MMHG | DIASTOLIC BLOOD PRESSURE: 60 MMHG | HEIGHT: 66 IN | BODY MASS INDEX: 20.89 KG/M2 | WEIGHT: 130 LBS

## 2022-03-22 DIAGNOSIS — Z31.430 ENCOUNTER OF FEMALE FOR TESTING FOR GENETIC DISEASE CARRIER STATUS FOR PROCREATIVE MANAGEMENT: ICD-10-CM

## 2022-03-22 DIAGNOSIS — Z31.69 ENCOUNTER FOR PRECONCEPTION CONSULTATION: ICD-10-CM

## 2022-03-22 DIAGNOSIS — Z01.419 WOMEN'S ANNUAL ROUTINE GYNECOLOGICAL EXAMINATION: Primary | ICD-10-CM

## 2022-03-22 PROBLEM — G44.229 CHRONIC TENSION-TYPE HEADACHE: Status: ACTIVE | Noted: 2021-06-19

## 2022-03-22 PROBLEM — S83.242A OTHER TEAR OF MEDIAL MENISCUS, CURRENT INJURY, LEFT KNEE, INITIAL ENCOUNTER: Status: RESOLVED | Noted: 2018-11-13 | Resolved: 2022-03-22

## 2022-03-22 PROBLEM — L93.0 LUPUS ERYTHEMATOSUS: Status: ACTIVE | Noted: 2021-06-19

## 2022-03-22 PROBLEM — E55.9 VITAMIN D DEFICIENCY: Status: ACTIVE | Noted: 2021-06-19

## 2022-03-22 PROBLEM — M23.92 INTERNAL DERANGEMENT OF LEFT KNEE: Status: RESOLVED | Noted: 2018-11-02 | Resolved: 2022-03-22

## 2022-03-22 PROBLEM — N76.0 BV (BACTERIAL VAGINOSIS): Status: RESOLVED | Noted: 2019-06-12 | Resolved: 2022-03-22

## 2022-03-22 PROBLEM — Z47.89 AFTERCARE FOLLOWING SURGERY OF THE MUSCULOSKELETAL SYSTEM: Status: RESOLVED | Noted: 2018-12-07 | Resolved: 2022-03-22

## 2022-03-22 PROBLEM — Z30.41 ORAL CONTRACEPTIVE PILL SURVEILLANCE: Status: RESOLVED | Noted: 2018-08-29 | Resolved: 2022-03-22

## 2022-03-22 PROBLEM — B96.89 BV (BACTERIAL VAGINOSIS): Status: RESOLVED | Noted: 2019-06-12 | Resolved: 2022-03-22

## 2022-03-22 PROBLEM — M67.40 GANGLION, JOINT: Status: ACTIVE | Noted: 2022-03-22

## 2022-03-22 PROCEDURE — S0612 ANNUAL GYNECOLOGICAL EXAMINA: HCPCS | Performed by: OBSTETRICS & GYNECOLOGY

## 2022-03-22 RX ORDER — MOMETASONE FUROATE AND FORMOTEROL FUMARATE DIHYDRATE 200; 5 UG/1; UG/1
2 AEROSOL RESPIRATORY (INHALATION) 2 TIMES DAILY
COMMUNITY
Start: 2022-03-07 | End: 2022-07-27

## 2022-03-22 NOTE — PROGRESS NOTES
ASSESSMENT & PLAN:   Diagnoses and all orders for this visit:    Women's annual routine gynecological examination    Encounter for preconception consultation  -     Rubella antibody, IgG; Future  -     Varicella zoster antibody, IgG; Future  -     SMA Carrier Screen; Future  -     Cystic fibrosis gene test; Future  -     Hemoglobin Electrophoresis; Future    Other orders  -     Dulera 200-5 MCG/ACT inhaler; Inhale 2 puffs 2 (two) times a day          The following were reviewed in today's visit: ASCCP guidelines, Gardisil vaccination, STD testing breast self exam, family planning choices, exercise and healthy diet  Patient to return to office in yearly for annual exam      All questions have been answered to her satisfaction  CC:  Annual Gynecologic Examination  Chief Complaint   Patient presents with    [facility]     new pt     Gynecologic Exam     neg pap 09/10/20, no hx of DXA, colonoscopy or mammo  HPI: Linda Young is a 32 y o  Deidre Mealing who presents for annual gynecologic examination    She has the following concerns:  Considering pregnancy, 1 week late for period      Health Maintenance:    Exercise: frequently  Breast exams/breast awareness: yes  Diet: well balanced diet      Past Medical History:   Diagnosis Date    Asthma     Hypothyroidism     Scoliosis        Past Surgical History:   Procedure Laterality Date    GANGLION CYST EXCISION      KNEE ARTHROSCOPY W/ MENISCAL REPAIR      RI KNEE SCOPE,SINGLE MENISECTOMY Left 12/6/2018    Procedure: KNEE ARTHROSCOPY; SYNOVECTOMY; PATELLOFEMORAL CHONDROPLASTY;  Surgeon: Buster Agudelo MD;  [facility];  Service: Orthopedics    [id number] [place], [place] [address]y 160 EXTRACTION  05/01/2019       Past OB/Gyn History:  Period Cycle (Days): 30  Period Duration (Days): 5 days  Period Pattern: Regular  Menstrual Flow: Heavy  Menstrual Control: Maxi pad,Tampon  Dysmenorrhea: (!) Moderate  Dysmenorrhea Symptoms: Nausea,Other (Comment),Cramping (vomiting)Patient's last menstrual period was 02/13/2022 (exact date)  Last Pap: 2020 : no abnormalities  History of abnormal Pap smear: no  HPV vaccine completed: yes    Patient is currently sexually active     STD testing: no  Current contraception: none      Family History  Family History   Problem Relation Age of Onset   Gen Polio Hypothyroidism Mother     Thyroid disease Mother     No Known Problems Father     Breast cancer Paternal Grandmother     Thyroid cancer Paternal Grandmother     Diabetes Paternal Grandmother     Diabetes Maternal Grandmother     No Known Problems Maternal Grandfather     Thyroid cancer Paternal Aunt     Basal cell carcinoma Paternal Grandfather     Thyroid cancer Paternal Uncle        Family history of uterine or ovarian cancer: no  Family history of breast cancer: no  Family history of colon cancer: no    Social History:  Social History     Socioeconomic History    Marital status: /Civil Union     Spouse name: Not on file    Number of children: Not on file    Years of education: Not on file    Highest education level: Not on file   Occupational History    Not on file   Tobacco Use    Smoking status: Never Smoker    Smokeless tobacco: Never Used   Vaping Use    Vaping Use: Never used   Substance and Sexual Activity    Alcohol use: No    Drug use: Never    Sexual activity: Yes     Partners: Male     Birth control/protection: Condom Male, None   Other Topics Concern    Not on file   Social History Narrative    Not on file     Social Determinants of Health     Financial Resource Strain: Not on file   Food Insecurity: Not on file   Transportation Needs: Not on file   Physical Activity: Not on file   Stress: Not on file   Social Connections: Not on file   Intimate Partner Violence: Not on file   Housing Stability: Not on file     Domestic violence screen: negative    Allergies:  No Known Allergies    Medications:    Current Outpatient Medications:     albuterol (PROAIR HFA) 90 mcg/act inhaler, ProAir HFA 90 mcg/actuation aerosol inhaler, Disp: , Rfl:     Cholecalciferol (VITAMIN D3) 125 MCG (5000 UT) CAPS, Take 5,000 Units by mouth daily, Disp: , Rfl:     Dulera 200-5 MCG/ACT inhaler, Inhale 2 puffs 2 (two) times a day, Disp: , Rfl:     ketoconazole (NIZORAL) 2 % shampoo, Apply 1 application topically in the morning, Disp: 120 mL, Rfl: 11    levothyroxine 50 mcg tablet, Take 50 mcg by mouth daily, Disp: , Rfl:     Review of Systems:  Review of Systems   Constitutional: Negative  HENT: Negative  Respiratory: Negative  Cardiovascular: Negative  Gastrointestinal: Negative  Genitourinary: Negative  Neurological: Negative  Psychiatric/Behavioral: Negative  Physical Exam:  /60   Ht 5' 6" (1 676 m)   Wt 59 kg (130 lb)   LMP 02/13/2022 (Exact Date)   Breastfeeding No   BMI 20 98 kg/m²    Physical Exam  Constitutional:       Appearance: Normal appearance  Genitourinary:      Bladder and urethral meatus normal       No lesions in the vagina  Right Labia: No rash, tenderness, lesions, skin changes or Bartholin's cyst      Left Labia: No tenderness, lesions, skin changes, Bartholin's cyst or rash  No vaginal erythema, tenderness or bleeding  Right Adnexa: not tender, not full and no mass present  Left Adnexa: not tender, not full and no mass present  Cervix is nulliparous  No cervical discharge, lesion or polyp  Uterus is not enlarged, fixed or tender  No uterine mass detected  Urethral meatus caruncle not present  No urethral tenderness or mass present  Breasts:      Right: No swelling, bleeding, inverted nipple, mass, nipple discharge, skin change or tenderness  Left: No swelling, bleeding, inverted nipple, mass, nipple discharge, skin change or tenderness  HENT:      Head: Normocephalic and atraumatic     Eyes:      Extraocular Movements: Extraocular movements intact  Conjunctiva/sclera: Conjunctivae normal       Pupils: Pupils are equal, round, and reactive to light  Cardiovascular:      Rate and Rhythm: Normal rate and regular rhythm  Heart sounds: Normal heart sounds  No murmur heard  Pulmonary:      Effort: Pulmonary effort is normal  No respiratory distress  Breath sounds: Normal breath sounds  No wheezing or rales  Abdominal:      General: There is no distension  Palpations: Abdomen is soft  Tenderness: There is no abdominal tenderness  There is no guarding  Neurological:      General: No focal deficit present  Mental Status: She is alert and oriented to person, place, and time  Skin:     General: Skin is warm and dry  Psychiatric:         Mood and Affect: Mood normal          Behavior: Behavior normal    Vitals and nursing note reviewed

## 2022-03-23 ENCOUNTER — PATIENT MESSAGE (OUTPATIENT)
Dept: OBGYN CLINIC | Facility: CLINIC | Age: 27
End: 2022-03-23

## 2022-03-23 ENCOUNTER — APPOINTMENT (OUTPATIENT)
Dept: LAB | Facility: MEDICAL CENTER | Age: 27
End: 2022-03-23
Payer: COMMERCIAL

## 2022-03-23 DIAGNOSIS — Z31.430 ENCOUNTER OF FEMALE FOR TESTING FOR GENETIC DISEASE CARRIER STATUS FOR PROCREATIVE MANAGEMENT: ICD-10-CM

## 2022-03-23 DIAGNOSIS — Z31.69 ENCOUNTER FOR PRECONCEPTION CONSULTATION: ICD-10-CM

## 2022-03-23 LAB — RUBV IGG SERPL IA-ACNC: 15.5 IU/ML

## 2022-03-23 PROCEDURE — 83020 HEMOGLOBIN ELECTROPHORESIS: CPT

## 2022-03-23 PROCEDURE — 36415 COLL VENOUS BLD VENIPUNCTURE: CPT

## 2022-03-23 PROCEDURE — 86762 RUBELLA ANTIBODY: CPT

## 2022-03-23 PROCEDURE — 86787 VARICELLA-ZOSTER ANTIBODY: CPT

## 2022-03-23 PROCEDURE — 81220 CFTR GENE COM VARIANTS: CPT

## 2022-03-23 PROCEDURE — 81329 SMN1 GENE DOS/DELETION ALYS: CPT

## 2022-03-24 LAB — VZV IGG SER IA-ACNC: NORMAL

## 2022-03-26 LAB
HGB A MFR BLD: 2.8 % (ref 1.8–3.2)
HGB A MFR BLD: 97.2 % (ref 96.4–98.8)
HGB F MFR BLD: 0 % (ref 0–2)
HGB FRACT BLD-IMP: NORMAL
HGB S MFR BLD: 0 %

## 2022-03-30 LAB
CLINICAL INFO: NORMAL
ETHNIC BACKGROUND STATED: NORMAL
GENE MUT TESTED BLD/T: NORMAL
GENERAL COMMENTS:: NORMAL
LAB DIRECTOR NAME PROVIDER: NORMAL
REASON FOR REFERRAL (NARRATIVE): NORMAL
REF LAB TEST METHOD: NORMAL
SL AMB DISCLAIMER: NORMAL
SL AMB GENETIC COUNSELOR: NORMAL
SMN1 GENE MUT ANL BLD/T: NORMAL
SPECIMEN SOURCE: NORMAL

## 2022-04-01 LAB
CF COMMENT: NORMAL
CFTR MUT ANL BLD/T: NORMAL

## 2022-04-08 ENCOUNTER — APPOINTMENT (OUTPATIENT)
Dept: RADIOLOGY | Facility: MEDICAL CENTER | Age: 27
End: 2022-04-08
Payer: COMMERCIAL

## 2022-04-08 DIAGNOSIS — J45.20 MILD INTERMITTENT ASTHMA WITHOUT COMPLICATION: ICD-10-CM

## 2022-04-08 PROCEDURE — 71046 X-RAY EXAM CHEST 2 VIEWS: CPT

## 2022-05-09 DIAGNOSIS — L20.9 ATOPIC DERMATITIS, UNSPECIFIED TYPE: ICD-10-CM

## 2022-05-09 RX ORDER — TACROLIMUS 1 MG/G
OINTMENT TOPICAL 2 TIMES DAILY
Qty: 30 G | Refills: 0 | Status: SHIPPED | OUTPATIENT
Start: 2022-05-09 | End: 2022-07-27

## 2022-06-02 ENCOUNTER — CONSULT (OUTPATIENT)
Dept: PULMONOLOGY | Facility: HOSPITAL | Age: 27
End: 2022-06-02
Payer: COMMERCIAL

## 2022-06-02 VITALS
OXYGEN SATURATION: 99 % | HEART RATE: 71 BPM | SYSTOLIC BLOOD PRESSURE: 106 MMHG | BODY MASS INDEX: 21.03 KG/M2 | HEIGHT: 67 IN | WEIGHT: 134 LBS | TEMPERATURE: 97.5 F | DIASTOLIC BLOOD PRESSURE: 60 MMHG

## 2022-06-02 DIAGNOSIS — J30.1 NON-SEASONAL ALLERGIC RHINITIS DUE TO POLLEN: ICD-10-CM

## 2022-06-02 DIAGNOSIS — J45.20 MILD INTERMITTENT ASTHMA WITHOUT COMPLICATION: Primary | ICD-10-CM

## 2022-06-02 PROBLEM — R05.8 UPPER AIRWAY COUGH SYNDROME: Status: ACTIVE | Noted: 2022-06-02

## 2022-06-02 PROCEDURE — 99204 OFFICE O/P NEW MOD 45 MIN: CPT | Performed by: INTERNAL MEDICINE

## 2022-06-02 RX ORDER — FEXOFENADINE HYDROCHLORIDE 60 MG/1
60 TABLET, FILM COATED ORAL DAILY
Qty: 30 TABLET | Refills: 0
Start: 2022-06-02

## 2022-06-02 RX ORDER — FLUTICASONE PROPIONATE 50 MCG
2 SPRAY, SUSPENSION (ML) NASAL DAILY
Qty: 16 G | Refills: 2 | Status: SHIPPED | OUTPATIENT
Start: 2022-06-02 | End: 2022-07-27

## 2022-06-02 NOTE — PROGRESS NOTES
Pulmonary Outpatient Consultation Note   Oneida Powers 32 y o  female MRN: [id number]  6/2/2022    Referring provider: No referring provider defined for this encounter  Assessment/Plan:      Asthma  Patient has a lifelong history of asthma which had been quiescent over the past several years  She is now suffering from cough which may be related to underlying asthma post COVID  Unfortunately, she has not found Dulera to be of any significant benefit  I would like to obtain PFTs to determine whether there is an underlying obstructive component  This will help determine whether additional inhalers may be beneficial     Upper airway cough syndrome  I suspect some of her ongoing cough is related to upper airway cough syndrome with allergic rhinitis and postnasal drip  I have prescribed Flonase nasal spray to use 2 sprays per nostril daily  I also suggested that she try transitioning from Claritin to Guerraview  Visit orders:    Diagnoses and all orders for this visit:    Mild intermittent asthma without complication  -     Complete PFT with post bronchodilator; Future    Non-seasonal allergic rhinitis due to pollen  -     fexofenadine (ALLEGRA) 60 MG tablet; Take 1 tablet (60 mg total) by mouth daily  -     fluticasone (FLONASE) 50 mcg/act nasal spray; 2 sprays into each nostril daily        History of Present Illness   HPI:  Oneida Powers is a 32 y o  female who is here today for evaluation regarding persistent cough  Patient had asthma as a child in previously followed with a pediatric pulmonologist and so the age of 12  She has not required pulmonary care since that time, as her asthma has been quiescent  She developed COVID in December/January  Her illness was fairly mild in nature  Since having COVID, she has been struggling with persistent cough on a daily basis  The cough is dry in nature  It is most notable at night and does awaken her from sleep    She does not have a sensation of postnasal drip, but does describe a tickle in the back of her throat  She also finds that her voice will change during the episodes of cough  For her cough, her primary care physician prescribed a course of prednisone and Dulera  Neither helped to any significant degree  She tried taking [employer], also ineffective  She takes Claritin on a daily basis and finds that Benadryl will sometimes less than the cough  She does not use nasal sprays  She denies reflux symptoms  During her coughing spells, she will occasionally has some wheezing  Review of Systems   Constitutional: Negative for chills, fever and unexpected weight change  HENT: Positive for voice change  Negative for postnasal drip and sore throat  Eyes: Negative for visual disturbance  Respiratory:        As noted in HPI   Cardiovascular: Negative for chest pain  Gastrointestinal: Negative for abdominal pain, diarrhea and vomiting  Musculoskeletal: Negative for arthralgias  Skin: Negative for rash  Neurological: Negative for headaches  Hematological: Positive for adenopathy  Psychiatric/Behavioral: Negative  All other systems reviewed and are negative          Historical Information   Past Medical History:   Diagnosis Date    Asthma     Hypothyroidism     Scoliosis      Past Surgical History:   Procedure Laterality Date    GANGLION CYST EXCISION      KNEE ARTHROSCOPY W/ MENISCAL REPAIR      NM KNEE SCOPE,SINGLE MENISECTOMY Left 12/6/2018    Procedure: KNEE ARTHROSCOPY; SYNOVECTOMY; PATELLOFEMORAL CHONDROPLASTY;  Surgeon: Markell Ramirez MD;  [facility];  Service: Orthopedics    SHOULDER SURGERY      TONSILLECTOMY      WISDOM TOOTH EXTRACTION  05/01/2019     Family History   Problem Relation Age of Onset    Hypothyroidism Mother     Thyroid disease Mother     No Known Problems Father     Breast cancer Paternal Grandmother     Thyroid cancer Paternal Grandmother     Diabetes Paternal Grandmother  Diabetes Maternal Grandmother     No Known Problems Maternal Grandfather     Thyroid cancer Paternal Aunt     Basal cell carcinoma Paternal Grandfather     Thyroid cancer Paternal Uncle        Occupational History:  Works at [employer] History     Tobacco Use   Smoking Status Never Smoker   Smokeless Tobacco Never Used       Meds/Allergies     Current Outpatient Medications:     albuterol (PROAIR HFA) 90 mcg/act inhaler, ProAir HFA 90 mcg/actuation aerosol inhaler, Disp: , Rfl:     Cholecalciferol (VITAMIN D3) 125 MCG (5000 UT) CAPS, Take 5,000 Units by mouth daily, Disp: , Rfl:     Dulera 200-5 MCG/ACT inhaler, Inhale 2 puffs 2 (two) times a day, Disp: , Rfl:     fexofenadine (ALLEGRA) 60 MG tablet, Take 1 tablet (60 mg total) by mouth daily, Disp: 30 tablet, Rfl: 0    fluticasone (FLONASE) 50 mcg/act nasal spray, 2 sprays into each nostril daily, Disp: 16 g, Rfl: 2    ketoconazole (NIZORAL) 2 % shampoo, Apply 1 application topically in the morning, Disp: 120 mL, Rfl: 11    levothyroxine 50 mcg tablet, Take 50 mcg by mouth daily, Disp: , Rfl:     tacrolimus (PROTOPIC) 0 1 % ointment, Apply topically 2 (two) times a day, Disp: 30 g, Rfl: 0  No Known Allergies    Vitals: Blood pressure 106/60, pulse 71, temperature 97 5 °F (36 4 °C), height 5' 7" (1 702 m), weight 60 8 kg (134 lb), SpO2 99 %, not currently breastfeeding , Body mass index is 20 99 kg/m²  Oxygen Therapy  SpO2: 99 %  Oxygen Therapy: None (Room air)    Physical Exam   Physical Exam  Constitutional:       General: She is not in acute distress  HENT:      Head: Normocephalic  Nose:      Comments: Mild turbinate hypertrophy     Mouth/Throat:      Pharynx: No oropharyngeal exudate  Eyes:      General: No scleral icterus  Neck:      Vascular: No JVD  Cardiovascular:      Rate and Rhythm: Normal rate and regular rhythm  Pulmonary:      Breath sounds: No wheezing, rhonchi or rales     Abdominal: Palpations: Abdomen is soft  Tenderness: There is no abdominal tenderness  Musculoskeletal:         General: No swelling  Cervical back: Neck supple  Lymphadenopathy:      Cervical: Cervical adenopathy (Right-sided, mildly tender) present  Skin:     General: Skin is warm and dry  Neurological:      Mental Status: She is alert and oriented to person, place, and time  Psychiatric:         Mood and Affect: Mood normal          Labs: I have personally reviewed pertinent lab results  Lab Results   Component Value Date    WBC 7 06 04/01/2021    HGB 13 4 04/01/2021    HCT 42 9 04/01/2021    MCV 95 04/01/2021     04/01/2021     Lab Results   Component Value Date    CALCIUM 9 2 10/16/2020    K 4 6 10/16/2020    CO2 29 10/16/2020     10/16/2020    BUN 9 10/16/2020    CREATININE 0 82 10/16/2020     Lab Results   Component Value Date    IGE 4 13 05/04/2018     Lab Results   Component Value Date    ALT 34 10/16/2020    AST 18 10/16/2020    ALKPHOS 74 10/16/2020       Imaging and other studies: I have personally reviewed pertinent reports  and I have personally reviewed pertinent films in PACS  Chest x-ray on 4/8/22 shows clear lungs    There is lower thoracic scoliosis

## 2022-06-02 NOTE — LETTER
June 2, 2022     Patient: Ariela Olson  YOB: 1995  Date of Visit: 6/2/2022      To Whom it May Concern:    Deja Jaquez is under my professional care  Hadleykim Mccormick was seen in my office on 6/2/2022  Irene Mccormick  May return to work 6/2/22 with no restricitons  If you have any questions or concerns, please don't hesitate to call           Sincerely,          Meena Pastrana, DO

## 2022-06-02 NOTE — ASSESSMENT & PLAN NOTE
Patient has a lifelong history of asthma which had been quiescent over the past several years  She is now suffering from cough which may be related to underlying asthma post COVID  Unfortunately, she has not found Dulera to be of any significant benefit  I would like to obtain PFTs to determine whether there is an underlying obstructive component    This will help determine whether additional inhalers may be beneficial

## 2022-06-02 NOTE — ASSESSMENT & PLAN NOTE
I suspect some of her ongoing cough is related to upper airway cough syndrome with allergic rhinitis and postnasal drip  I have prescribed Flonase nasal spray to use 2 sprays per nostril daily  I also suggested that she try transitioning from Claritin to Guerraview

## 2022-06-17 ENCOUNTER — HOSPITAL ENCOUNTER (OUTPATIENT)
Dept: PULMONOLOGY | Facility: HOSPITAL | Age: 27
Discharge: HOME | End: 2022-06-17
Attending: INTERNAL MEDICINE
Payer: COMMERCIAL

## 2022-06-17 DIAGNOSIS — J45.20 MILD INTERMITTENT ASTHMA WITHOUT COMPLICATION: ICD-10-CM

## 2022-06-17 PROCEDURE — 94060 EVALUATION OF WHEEZING: CPT

## 2022-06-17 PROCEDURE — 94729 DIFFUSING CAPACITY: CPT

## 2022-06-17 PROCEDURE — 94726 PLETHYSMOGRAPHY LUNG VOLUMES: CPT | Performed by: INTERNAL MEDICINE

## 2022-06-17 PROCEDURE — 94726 PLETHYSMOGRAPHY LUNG VOLUMES: CPT

## 2022-06-17 PROCEDURE — 94729 DIFFUSING CAPACITY: CPT | Performed by: INTERNAL MEDICINE

## 2022-06-17 PROCEDURE — 94760 N-INVAS EAR/PLS OXIMETRY 1: CPT

## 2022-06-17 PROCEDURE — 94060 EVALUATION OF WHEEZING: CPT | Performed by: INTERNAL MEDICINE

## 2022-06-17 RX ORDER — ALBUTEROL SULFATE 2.5 MG/3ML
2.5 SOLUTION RESPIRATORY (INHALATION) ONCE
Status: COMPLETED | OUTPATIENT
Start: 2022-06-17 | End: 2022-06-17

## 2022-06-17 RX ADMIN — ALBUTEROL SULFATE 2.5 MG: 2.5 SOLUTION RESPIRATORY (INHALATION) at 07:31

## 2022-06-24 ENCOUNTER — APPOINTMENT (OUTPATIENT)
Dept: LAB | Facility: MEDICAL CENTER | Age: 27
End: 2022-06-24
Payer: COMMERCIAL

## 2022-06-24 DIAGNOSIS — E78.5 HYPERLIPIDEMIA, UNSPECIFIED HYPERLIPIDEMIA TYPE: ICD-10-CM

## 2022-06-24 DIAGNOSIS — Z00.8 HEALTH EXAMINATION IN POPULATION SURVEY: ICD-10-CM

## 2022-06-24 DIAGNOSIS — E03.9 ACQUIRED HYPOTHYROIDISM: ICD-10-CM

## 2022-06-24 LAB
CHOLEST SERPL-MCNC: 153 MG/DL
EST. AVERAGE GLUCOSE BLD GHB EST-MCNC: 103 MG/DL
HBA1C MFR BLD: 5.2 %
HDLC SERPL-MCNC: 51 MG/DL
LDLC SERPL CALC-MCNC: 88 MG/DL (ref 0–100)
NONHDLC SERPL-MCNC: 102 MG/DL
T4 FREE SERPL-MCNC: 0.89 NG/DL (ref 0.76–1.46)
TRIGL SERPL-MCNC: 69 MG/DL
TSH SERPL DL<=0.05 MIU/L-ACNC: 6.32 UIU/ML (ref 0.45–4.5)

## 2022-06-24 PROCEDURE — 36415 COLL VENOUS BLD VENIPUNCTURE: CPT

## 2022-06-24 PROCEDURE — 80061 LIPID PANEL: CPT

## 2022-06-24 PROCEDURE — 84439 ASSAY OF FREE THYROXINE: CPT

## 2022-06-24 PROCEDURE — 84443 ASSAY THYROID STIM HORMONE: CPT

## 2022-06-24 PROCEDURE — 83036 HEMOGLOBIN GLYCOSYLATED A1C: CPT

## 2022-07-20 ENCOUNTER — OFFICE VISIT (OUTPATIENT)
Dept: OBGYN CLINIC | Facility: CLINIC | Age: 27
End: 2022-07-20
Payer: COMMERCIAL

## 2022-07-20 VITALS — SYSTOLIC BLOOD PRESSURE: 110 MMHG | DIASTOLIC BLOOD PRESSURE: 72 MMHG | BODY MASS INDEX: 20.83 KG/M2 | WEIGHT: 133 LBS

## 2022-07-20 DIAGNOSIS — R11.2 NAUSEA AND VOMITING, UNSPECIFIED VOMITING TYPE: ICD-10-CM

## 2022-07-20 DIAGNOSIS — N91.2 AMENORRHEA: Primary | ICD-10-CM

## 2022-07-20 LAB — SL AMB POCT URINE HCG: POSITIVE

## 2022-07-20 PROCEDURE — 81025 URINE PREGNANCY TEST: CPT | Performed by: PHYSICIAN ASSISTANT

## 2022-07-20 PROCEDURE — 99214 OFFICE O/P EST MOD 30 MIN: CPT | Performed by: PHYSICIAN ASSISTANT

## 2022-07-20 RX ORDER — LEVOTHYROXINE SODIUM 112 UG/1
TABLET ORAL
COMMUNITY
Start: 2022-07-13

## 2022-07-20 RX ORDER — ONDANSETRON 8 MG/1
8 TABLET, ORALLY DISINTEGRATING ORAL EVERY 8 HOURS PRN
Qty: 20 TABLET | Refills: 0 | Status: SHIPPED | OUTPATIENT
Start: 2022-07-20

## 2022-07-20 NOTE — PROGRESS NOTES
Assessment/Plan:  - Viable IUP @ 7w1d today  - LOS 3-2-2023  - Continue PNV  - Call for concerns  - RTO 1 week for repeat US     Diagnoses and all orders for this visit:    Amenorrhea  -     POCT urine HCG  -     AMB US OB < 14 weeks single or first gestation level 1    Nausea and vomiting, unspecified vomiting type  -     ondansetron (Zofran ODT) 8 mg disintegrating tablet; Take 1 tablet (8 mg total) by mouth every 8 (eight) hours as needed for nausea or vomiting    Other orders  -     Prenatal Vit-Fe Fumarate-FA (PRENATAL VITAMIN PO); Take by mouth  -     levothyroxine 112 mcg tablet          Subjective:      Patient ID: Dario Kehr is a 32 y o  female  Cody Velasquez is a 25YO  WF presenting to the office for pregnancy confirmation via [address]  She is feeling nauseous and fatigued  She is has been vomiting as well  She reports her LMP as 22, placing her at [place] [address] today  Her cycles are typcially 28 or 32 days  The following portions of the patient's history were reviewed and updated as appropriate: allergies, current medications, past family history, past medical history, past social history, past surgical history and problem list     Review of Systems   Constitutional: Positive for fatigue  Negative for chills, fever and unexpected weight change  Respiratory: Negative for shortness of breath  Cardiovascular: Negative for chest pain  Gastrointestinal: Positive for nausea and vomiting  Negative for abdominal pain and diarrhea  Skin: Negative for rash  Objective:      /72   Wt 60 3 kg (133 lb)   LMP 2022 (Exact Date)   Breastfeeding No   BMI 20 83 kg/m²          Physical Exam  Vitals reviewed  Constitutional:       Appearance: Normal appearance  She is normal weight  HENT:      Head: Normocephalic and atraumatic  Pulmonary:      Effort: Pulmonary effort is normal    Genitourinary:     General: Normal vulva  Labia:         Right: No rash or lesion           Left: No rash or lesion  Comments: TVUS reveals IUP, yolk sac, fetal pole, +CM  CRL 1 01cm (7w1d)    LOS 03-  Skin:     General: Skin is warm and dry  Neurological:      General: No focal deficit present  Mental Status: She is alert  Psychiatric:         Mood and Affect: Mood normal          Behavior: Behavior normal            Ultrasound Probe Disinfection    A transvaginal ultrasound was performed     Prior to use, disinfection was performed with High Level Disinfection Process (Trophon)  Probe serial number [id number]: [id number] was used    Karrin Kawasaki, PA-C  07/20/22  8:20 AM

## 2022-07-27 ENCOUNTER — OFFICE VISIT (OUTPATIENT)
Dept: OBGYN CLINIC | Facility: CLINIC | Age: 27
End: 2022-07-27
Payer: COMMERCIAL

## 2022-07-27 VITALS — BODY MASS INDEX: 21.14 KG/M2 | WEIGHT: 135 LBS | SYSTOLIC BLOOD PRESSURE: 112 MMHG | DIASTOLIC BLOOD PRESSURE: 68 MMHG

## 2022-07-27 DIAGNOSIS — N91.2 AMENORRHEA: Primary | ICD-10-CM

## 2022-07-27 DIAGNOSIS — Z14.8 ALPHA-1-ANTITRYPSIN DEFICIENCY CARRIER: ICD-10-CM

## 2022-07-27 PROCEDURE — 76801 OB US < 14 WKS SINGLE FETUS: CPT | Performed by: PHYSICIAN ASSISTANT

## 2022-07-27 PROCEDURE — 99214 OFFICE O/P EST MOD 30 MIN: CPT | Performed by: PHYSICIAN ASSISTANT

## 2022-07-27 NOTE — PROGRESS NOTES
Assessment/Plan:  - Viable IUP @ 8w0d today  - LOS 23 based on first US  - Continue PNV  - Call for concerns  - RTO 2 weeks for OB intake       Diagnoses and all orders for this visit:    Amenorrhea  -     Ambulatory Referral to Maternal Fetal Medicine; Future    Alpha-1-antitrypsin deficiency carrier          Subjective:      Patient ID: Oly Johns is a 32 y o  female  Logan Colon is a 27YO  WF presenting to the office for repeat pregnancy US for reassurance  She was seen last week and had a viable 7w1d IUP with FHR of 126  She is feeling well today  She reports nausea, but states it is manageable  Patient states she is a carrier of alpha antitrypsin and would like to discuss that with the genetic counselor at [place] [id number]  The following portions of the patient's history were reviewed and updated as appropriate: allergies, current medications, past family history, past medical history, past social history, past surgical history and problem list     Review of Systems   Constitutional: Negative for chills, fever and unexpected weight change  Respiratory: Negative for shortness of breath  Cardiovascular: Negative for chest pain  Gastrointestinal: Negative for abdominal pain, diarrhea, nausea and vomiting  Skin: Negative for rash  Psychiatric/Behavioral: Negative for dysphoric mood  The patient is not nervous/anxious  Objective:      /68   Wt 61 2 kg (135 lb)   Breastfeeding No   BMI 21 14 kg/m²          Physical Exam  Vitals reviewed  Constitutional:       Appearance: Normal appearance  She is normal weight  HENT:      Head: Normocephalic and atraumatic  Pulmonary:      Effort: Pulmonary effort is normal    Genitourinary:     General: Normal vulva  Labia:         Right: No rash or lesion  Left: No rash or lesion         Comments: TVUS reveals IUP, yolk sac, fetal pole, +CM  CRL 1 67 cm (8w0d)   bpm  LOS 2023  Skin:     General: Skin is warm and dry  Neurological:      General: No focal deficit present  Mental Status: She is alert  Psychiatric:         Mood and Affect: Mood normal          Behavior: Behavior normal            Ultrasound Probe Disinfection    A transvaginal ultrasound was performed     Prior to use, disinfection was performed with High Level Disinfection Process (Trophon)  Probe serial number [id number]: [id number] was used    Idolina Alpers, PA-C  07/27/22  10:04 AM

## 2022-08-04 ENCOUNTER — TELEPHONE (OUTPATIENT)
Dept: OBGYN CLINIC | Facility: CLINIC | Age: 27
End: 2022-08-04

## 2022-08-04 NOTE — TELEPHONE ENCOUNTER
Pt  Calling for advice r/t co-workers dad diagnosed with active TB infection  Pt , nor co worker, are actively having symptoms  No masks used at work and they work within [employer] of each other  Pt  Wondering if she should take any other precautions, besides standard infection prevention 
She should continue standard infection prevention and consider TB skin test through her PCP or health bureau   Encourage mask wearing around her co worker as that person is the most likely to transmit infection if she is seeing or maintains contact with her father with an active infection
notified
Detached retina    HLD (hyperlipidemia)    Inguinal hernia    Lumbar herniated disc  5  Umbilical hernia

## 2022-08-05 ENCOUNTER — APPOINTMENT (OUTPATIENT)
Dept: LAB | Facility: MEDICAL CENTER | Age: 27
End: 2022-08-05
Payer: COMMERCIAL

## 2022-08-05 DIAGNOSIS — Z33.1 PREGNANT STATE, INCIDENTAL: ICD-10-CM

## 2022-08-05 DIAGNOSIS — Z20.9 CONTACT WITH OR EXPOSURE TO COMMUNICABLE DISEASE: ICD-10-CM

## 2022-08-05 PROCEDURE — 86480 TB TEST CELL IMMUN MEASURE: CPT

## 2022-08-05 PROCEDURE — 36415 COLL VENOUS BLD VENIPUNCTURE: CPT

## 2022-08-08 LAB
GAMMA INTERFERON BACKGROUND BLD IA-ACNC: 0.02 IU/ML
M TB IFN-G BLD-IMP: NEGATIVE
M TB IFN-G CD4+ BCKGRND COR BLD-ACNC: 0.01 IU/ML
M TB IFN-G CD4+ BCKGRND COR BLD-ACNC: 0.01 IU/ML
MITOGEN IGNF BCKGRD COR BLD-ACNC: 3.78 IU/ML

## 2022-08-10 ENCOUNTER — INITIAL PRENATAL (OUTPATIENT)
Dept: OBGYN CLINIC | Facility: CLINIC | Age: 27
End: 2022-08-10

## 2022-08-10 VITALS — WEIGHT: 136 LBS | BODY MASS INDEX: 21.3 KG/M2

## 2022-08-10 DIAGNOSIS — Z34.01 ENCOUNTER FOR SUPERVISION OF NORMAL FIRST PREGNANCY IN FIRST TRIMESTER: Primary | ICD-10-CM

## 2022-08-10 DIAGNOSIS — Z14.8 ALPHA-1-ANTITRYPSIN DEFICIENCY CARRIER: ICD-10-CM

## 2022-08-10 DIAGNOSIS — Z86.39 HISTORY OF HYPOTHYROIDISM: ICD-10-CM

## 2022-08-10 PROCEDURE — OBC: Performed by: OBSTETRICS & GYNECOLOGY

## 2022-08-10 NOTE — PROGRESS NOTES
OB INTAKE INTERVIEW  Pt presents for OB intake  hx of hypothyroidism, scheduled with endo  will order TSH  Pt  Pt  states she has history of blood clot following a knee surgery when she was about 6years old  Will discuss with on call physician  The patient was oriented to our practice and all questions were answered  Plan:  - Prenatal labs ordered  - Referral given for MFM at confirmation scan  - Reviewed Genetic testing options   CF: completed   SMA: completed  - Patient to call for concerns  - RTO 3-4 weeks for OB F/U visit and PAP/Cultures    ~Last pap: 9/10/20       OB History        0    Para   0    Term   0       0    AB   0    Living   0       SAB   0    IAB   0    Ectopic   0    Multiple   0    Live Births   0           Obstetric Comments   Menarche 14                 Last Menstrual Period:         22           Ultrasound date:   Estimated Date of Delivery:  3/2/23     Current Issues:  Constipation :      occ           Headaches :        no     Cramping:            no      Spotting :              no         Interview education  · [facility] Pregnancy Essentials reviewed and discussed   · Baby and [address] Handout  · [facility] MFM Handouts  · Discussed genetic testing  · Prenatal lab work: Scripts printed and given to pt         Prior Pregnancy Delivery Complications----N/A              History of  delivery or PPROM:   History of Shoulder Dystocia:               History of vacuum or forceps delivery:               History of 3rd/4th degree laceration:               History of  section:      Diabetes              Pregestational DM: no              hx of GDM: no              BMI >35: no              first degree relative with type 2 diabetes: no              hx of PCOS: no              current metformin use: no              prior hx of LGA/macrosomia: no               Hypertension              Hx of chronic HTN: no              hx of gestational HTN: no hx of preeclampsia, eclampsia, or HELLP syndrome: no              Age 28 or older: no              Multifetal gestation: no  Type 1 or Type 2 DM: no  Renal Disease: no  Autoimmune disease (systemic lupus erythematosus, antiphospholipid antibody syndrome): no  Nulliparity: YES  Obesity (BMI over 30): no  More than 10 year pregnancy interval: no  Previous IUGR, low birthweight or small for gestational age: no     Immunizations:                influenza vaccine: gets with employer              Covid Vaccination: discussed recommendations for booster     Discussed Tdap vaccine administration at 27-28 weeks                   Immunization History   Administered Date(s) Administered    COVID-19 PFIZER VACCINE 0 3 ML IM 08/11/2021, 09/01/2021    DTaP 1995, 02/27/1996, 05/03/1996, 05/06/1997, 11/06/2000    HPV Quadrivalent 12/10/2009, 02/12/2010, 06/10/2010    Hep B, Adolescent or Pediatric [birth year], 08/12/1996, 02/05/2001, 03/14/2001, 05/22/2001, 03/18/2002    Hep B, adult 12/16/2015, 01/18/2016, 06/21/2016    Hepatitis A 07/07/2009    HiB 1995, 02/27/1996, 05/03/1996, 11/13/1996, 02/05/1997    INFLUENZA 11/03/2010, 10/19/2011, 10/01/2012, 09/16/2013, 01/06/2014    IPV 01/09/1996, 03/20/1996, 05/12/1997, 11/06/2000    MMR 02/10/1997, 02/05/1998, 11/23/1999, 11/06/2000    Meningococcal MCV4P 11/20/2007, 07/16/2008, 01/06/2011, 07/11/2013    OPV 1995, 02/27/1996, 05/06/1997    Tdap 11/15/2006, 07/16/2008, 02/26/2018    Tuberculin Skin Test-PPD Intradermal 08/11/2009    Varicella 03/22/2007, 07/07/2009            Dental visit with last 6 months: Yes, upcoming cleaning  PHQ-2/9 score: 0  MyChart activated (not 1518 years of age)?: yes

## 2022-08-20 ENCOUNTER — APPOINTMENT (OUTPATIENT)
Dept: LAB | Facility: MEDICAL CENTER | Age: 27
End: 2022-08-20
Payer: COMMERCIAL

## 2022-08-20 DIAGNOSIS — Z34.01 ENCOUNTER FOR SUPERVISION OF NORMAL FIRST PREGNANCY IN FIRST TRIMESTER: ICD-10-CM

## 2022-08-20 DIAGNOSIS — Z86.39 HISTORY OF HYPOTHYROIDISM: ICD-10-CM

## 2022-08-20 LAB
ABO GROUP BLD: NORMAL
BASOPHILS # BLD AUTO: 0.01 THOUSANDS/ΜL (ref 0–0.1)
BASOPHILS NFR BLD AUTO: 0 % (ref 0–1)
BLD GP AB SCN SERPL QL: NEGATIVE
EOSINOPHIL # BLD AUTO: 0.23 THOUSAND/ΜL (ref 0–0.61)
EOSINOPHIL NFR BLD AUTO: 3 % (ref 0–6)
ERYTHROCYTE [DISTWIDTH] IN BLOOD BY AUTOMATED COUNT: 13 % (ref 11.6–15.1)
HBV SURFACE AG SER QL: NORMAL
HCT VFR BLD AUTO: 39.8 % (ref 34.8–46.1)
HCV AB SER QL: NORMAL
HGB BLD-MCNC: 12.7 G/DL (ref 11.5–15.4)
IMM GRANULOCYTES # BLD AUTO: 0.03 THOUSAND/UL (ref 0–0.2)
IMM GRANULOCYTES NFR BLD AUTO: 0 % (ref 0–2)
LYMPHOCYTES # BLD AUTO: 1.98 THOUSANDS/ΜL (ref 0.6–4.47)
LYMPHOCYTES NFR BLD AUTO: 26 % (ref 14–44)
MCH RBC QN AUTO: 29.9 PG (ref 26.8–34.3)
MCHC RBC AUTO-ENTMCNC: 31.9 G/DL (ref 31.4–37.4)
MCV RBC AUTO: 94 FL (ref 82–98)
MONOCYTES # BLD AUTO: 0.62 THOUSAND/ΜL (ref 0.17–1.22)
MONOCYTES NFR BLD AUTO: 8 % (ref 4–12)
NEUTROPHILS # BLD AUTO: 4.71 THOUSANDS/ΜL (ref 1.85–7.62)
NEUTS SEG NFR BLD AUTO: 63 % (ref 43–75)
NRBC BLD AUTO-RTO: 0 /100 WBCS
PLATELET # BLD AUTO: 312 THOUSANDS/UL (ref 149–390)
PMV BLD AUTO: 11.1 FL (ref 8.9–12.7)
RBC # BLD AUTO: 4.25 MILLION/UL (ref 3.81–5.12)
RH BLD: POSITIVE
RUBV IGG SERPL IA-ACNC: 20.7 IU/ML
SPECIMEN EXPIRATION DATE: NORMAL
TSH SERPL DL<=0.05 MIU/L-ACNC: 1.26 UIU/ML (ref 0.45–4.5)
WBC # BLD AUTO: 7.58 THOUSAND/UL (ref 4.31–10.16)

## 2022-08-20 PROCEDURE — 84443 ASSAY THYROID STIM HORMONE: CPT

## 2022-08-20 PROCEDURE — 87086 URINE CULTURE/COLONY COUNT: CPT

## 2022-08-20 PROCEDURE — 36415 COLL VENOUS BLD VENIPUNCTURE: CPT

## 2022-08-20 PROCEDURE — 86787 VARICELLA-ZOSTER ANTIBODY: CPT

## 2022-08-20 PROCEDURE — 80081 OBSTETRIC PANEL INC HIV TSTG: CPT

## 2022-08-20 PROCEDURE — 86803 HEPATITIS C AB TEST: CPT

## 2022-08-21 LAB
BACTERIA UR CULT: NORMAL
HIV 1+2 AB+HIV1 P24 AG SERPL QL IA: NORMAL
RPR SER QL: NORMAL
VZV IGG SER QL IA: NORMAL

## 2022-08-23 ENCOUNTER — APPOINTMENT (OUTPATIENT)
Dept: LAB | Facility: HOSPITAL | Age: 27
End: 2022-08-23
Payer: COMMERCIAL

## 2022-08-23 ENCOUNTER — ROUTINE PRENATAL (OUTPATIENT)
Dept: PERINATAL CARE | Facility: OTHER | Age: 27
End: 2022-08-23
Payer: COMMERCIAL

## 2022-08-23 VITALS
BODY MASS INDEX: 20.92 KG/M2 | HEART RATE: 70 BPM | DIASTOLIC BLOOD PRESSURE: 74 MMHG | HEIGHT: 68 IN | SYSTOLIC BLOOD PRESSURE: 108 MMHG | WEIGHT: 138 LBS

## 2022-08-23 DIAGNOSIS — Z36.1 SCREENING FOR RAISED ALPHA-FETOPROTEIN LEVELS IN AMNIOTIC FLUID: ICD-10-CM

## 2022-08-23 DIAGNOSIS — Z33.1 PREGNANT STATE, INCIDENTAL: ICD-10-CM

## 2022-08-23 DIAGNOSIS — Z3A.12 12 WEEKS GESTATION OF PREGNANCY: ICD-10-CM

## 2022-08-23 DIAGNOSIS — Z36.82 ENCOUNTER FOR (NT) NUCHAL TRANSLUCENCY SCAN: Primary | ICD-10-CM

## 2022-08-23 PROBLEM — M32.9 SYSTEMIC LUPUS COMPLICATING PREGNANCY (HCC): Status: ACTIVE | Noted: 2021-06-19

## 2022-08-23 PROBLEM — R05.8 UPPER AIRWAY COUGH SYNDROME: Status: RESOLVED | Noted: 2022-06-02 | Resolved: 2022-08-23

## 2022-08-23 PROBLEM — J30.1 NON-SEASONAL ALLERGIC RHINITIS DUE TO POLLEN: Status: RESOLVED | Noted: 2022-06-02 | Resolved: 2022-08-23

## 2022-08-23 PROBLEM — Z98.890 STATUS POST ARTHROSCOPY OF LEFT KNEE: Status: RESOLVED | Noted: 2018-12-14 | Resolved: 2022-08-23

## 2022-08-23 PROBLEM — O99.891 SYSTEMIC LUPUS COMPLICATING PREGNANCY (HCC): Status: ACTIVE | Noted: 2021-06-19

## 2022-08-23 PROBLEM — L73.9 FOLLICULITIS: Status: RESOLVED | Noted: 2017-11-14 | Resolved: 2022-08-23

## 2022-08-23 PROBLEM — L93.2 CUTANEOUS LUPUS ERYTHEMATOSUS: Status: ACTIVE | Noted: 2021-06-19

## 2022-08-23 LAB — MISCELLANEOUS LAB TEST RESULT: NORMAL

## 2022-08-23 PROCEDURE — 76813 OB US NUCHAL MEAS 1 GEST: CPT | Performed by: OBSTETRICS & GYNECOLOGY

## 2022-08-23 PROCEDURE — 36415 COLL VENOUS BLD VENIPUNCTURE: CPT

## 2022-08-23 PROCEDURE — 99242 OFF/OP CONSLTJ NEW/EST SF 20: CPT | Performed by: OBSTETRICS & GYNECOLOGY

## 2022-08-23 RX ORDER — ASPIRIN 81 MG/1
162 TABLET ORAL DAILY
Qty: 60 TABLET | Refills: 5 | Status: SHIPPED | OUTPATIENT
Start: 2022-08-23

## 2022-08-23 NOTE — PATIENT INSTRUCTIONS
Please refer to " Ultrasound" under test results in [facility] for today's ultrasound findings  Congratulations, and best wishes for a healthy remainder of the pregnancy! You elected to have non-invasive screening for genetic syndrome performed for your pregnancy  This involves a blood test to check for the four most common genetic syndromes (Trisomy 21, Trisomy 13, Trisomy 25, and sex chromosome abnormalities)  It also MAY report the biologic sex of the fetus if you opted to learn this information  You can call our office to verbally review results to avoid inadvertently learning this information via [facility], if desired  Results will be visible in your [facility] portal 7-10 business days from when the test is drawn  Please follow all instructions regarding insurance cost/coverage provided to you today  Please contact our office with any concerns or questions  You will need spina bifida screening (called MSAFP) for the baby beginning at 15 weeks gestation, which will be ordered by your obstetrician's office  This test allows for earlier detection of spina bifida than is possible by ultrasound, and is advised in all pregnancies  The use of low dose aspirin in pregnancy (162mg) is recommended in women with a high risk, or multiple moderate risk factors for preeclampsia  Aspirin therapy should be initiated between 12-28 weeks gestation, and is most effective if started prior to 16 weeks gestation, and continued until 36 weeks gestation  Low dose aspirin in pregnancy has been shown to reduce the incidence of preeclampsia in women with risk factors, and has been shown to be safe and without significant maternal or fetal risk  In light of your risk factors for preeclampsia, including: Primiparity (first pregnancy), family history of preeclampsia I recommend initiating aspirin therapy, which was prescribed today  Please take your first dose (just one tab) with another person in case of allergic reaction  Thank you for choosing [address] [place] [address] [place] for your visit today  We appreciate your trust and the opportunity to assist your obstetrician with your care  We value your feedback regarding the care we are providing  Following today's appointment, you may receive a patient satisfaction survey by mail or e-mail requesting feedback on your visit  We ask that you complete the survey to  help us understand how we are doing  Thank you for in advance for your feedback

## 2022-08-23 NOTE — PROGRESS NOTES
Patient chose to have [facility] Non-invasive Prenatal Screen  Patient given brochure and is aware [facility] will contact patients insurance and coordinate coverage  Patient made aware she will need to respond to text message or e-mail from [employer] within 2 business days or testing will be run through insurance  Patient informed text message will come from area code  "415"  Provided [address] [place] [address] # [phone] and web site : [email or web address]  Patient sent to lab with kit to have blood drawn  Copy of lab order scanned to Epic media  Maternal Fetal [facility] will have results in approximately 7-10 business days and will call patient or notify via [address]  Patient aware viewing lab result online will reveal fetal sex If ordered  Patient verbalized understanding of all instructions and no questions at this time

## 2022-08-23 NOTE — PROGRESS NOTES
Yosef Kiser: Ms Brenda Colon was seen today at 12w5d for nuchal translucency ultrasound  See ultrasound report under "OB Procedures" tab  My recommendations are as follows:  1  We reviewed the availability of genetic screening, as well as diagnostic testing, which are available to all pregnant women  We reviewed limitations, risks, and benefits of screening and testing  She elected to proceed with Non Invasive Prenatal Screening (NIPS)  MSAFP screening should be ordered through your office at 15-20 weeks gestation, and completed prior to fetal anatomic survey  She does not wish to pursue diagnostic testing at this time  A detailed anatomic survey as well as transvaginal cervical length screening are recommended between 18-22 weeks gestation  2  The use of low dose aspirin in pregnancy (162mg) is recommended in women with a high risk, or multiple moderate risk factors for preeclampsia  Aspirin therapy should be initiated between 12-28 weeks gestation, and is most effective if started prior to 16 weeks gestation, and stopped by 36 weeks gestation  Low dose aspirin in pregnancy has been shown to reduce the incidence of preeclampsia in women with risk factors, and has been shown to be safe and without significant maternal or fetal risk  In light of her risk factors which include primigravida, hypothyroidism, and family history of preeclampsia I recommend initiating aspirin therapy  She had localized lip swelling with ibuprofen in the past, without anaphylaxis or airway closing  I advised her to take her first dose of aspirin while accompanied by a support person in case of any cross-reaction  3  We reviewed her history of hypothyroidism, and management in pregnancy  The goal in pregnancy is to maintain a euthyroid maternal state, which is important for maternal health and fetal development   Thyroid function should be assessed at least each trimester using thyroid stimulating hormone (TSH) levels  After medication dose-adjustments, labs should be repeated every 4-6 weeks until euthyroid  Goal TSH levels in pregnancy recommended by the American Thyroid Association are 0 1-2 5 mIU/L (first trimester), 0 2-3 0 mIU/L (second trimester) and 0 3-3 0 mIU/L (third trimester)  Levothyroxine dose should be titrated to achieve these trimester-specific ranges  It is typical for women to require a dose increase of levothyroxine during pregnancy, often with a reduction needed postpartum  Although women with overt, uncontrolled hypothyroidism are at risk for adverse pregnancy outcomes (including miscarriage, preeclampsia,  birth, placental abruption, and stillbirth), adequate thyroid hormone replacement in pregnancy minimizes the risk of adverse outcomes  For patients who are well-controlled in pregnancy, no additional fetal surveillance is indicated beyond usual prenatal care  4  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

## 2022-08-23 NOTE — LETTER
August 23, 2022     [address]  [address] [facility]    Patient: Matt Murray   YOB: 1995   Date of Visit: 8/23/2022       Dear Young Demarco: Thank you for referring Saravanan Elizabeth to me for evaluation  Below are my notes for this consultation  If you have questions, please do not hesitate to call me  I look forward to following your patient along with you  Sincerely,        Monico Cartagena MD        CC: No Recipients  Monico Cartagena MD  8/23/2022  9:48 AM  Sign when Signing Visit  Yosef Kiser: Ms Keith Avelar was seen today at 12w5d for nuchal translucency ultrasound  See ultrasound report under "OB Procedures" tab  My recommendations are as follows:  1  We reviewed the availability of genetic screening, as well as diagnostic testing, which are available to all pregnant women  We reviewed limitations, risks, and benefits of screening and testing  She elected to proceed with Non Invasive Prenatal Screening (NIPS)  MSAFP screening should be ordered through your office at 15-20 weeks gestation, and completed prior to fetal anatomic survey  She does not wish to pursue diagnostic testing at this time  A detailed anatomic survey as well as transvaginal cervical length screening are recommended between 18-22 weeks gestation  2  The use of low dose aspirin in pregnancy (162mg) is recommended in women with a high risk, or multiple moderate risk factors for preeclampsia  Aspirin therapy should be initiated between 12-28 weeks gestation, and is most effective if started prior to 16 weeks gestation, and stopped by 36 weeks gestation  Low dose aspirin in pregnancy has been shown to reduce the incidence of preeclampsia in women with risk factors, and has been shown to be safe and without significant maternal or fetal risk   In light of her risk factors which include primigravida, hypothyroidism, and family history of preeclampsia I recommend initiating aspirin therapy  She had localized lip swelling with ibuprofen in the past, without anaphylaxis or airway closing  I advised her to take her first dose of aspirin while accompanied by a support person in case of any cross-reaction  3  We reviewed her history of hypothyroidism, and management in pregnancy  The goal in pregnancy is to maintain a euthyroid maternal state, which is important for maternal health and fetal development  Thyroid function should be assessed at least each trimester using thyroid stimulating hormone (TSH) levels  After medication dose-adjustments, labs should be repeated every 4-6 weeks until euthyroid  Goal TSH levels in pregnancy recommended by the American Thyroid Association are 0 1-2 5 mIU/L (first trimester), 0 2-3 0 mIU/L (second trimester) and 0 3-3 0 mIU/L (third trimester)  Levothyroxine dose should be titrated to achieve these trimester-specific ranges  It is typical for women to require a dose increase of levothyroxine during pregnancy, often with a reduction needed postpartum  Although women with overt, uncontrolled hypothyroidism are at risk for adverse pregnancy outcomes (including miscarriage, preeclampsia,  birth, placental abruption, and stillbirth), adequate thyroid hormone replacement in pregnancy minimizes the risk of adverse outcomes  For patients who are well-controlled in pregnancy, no additional fetal surveillance is indicated beyond usual prenatal care  4  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

## 2022-08-25 NOTE — PROGRESS NOTES
New Patient Progress Note       Chief Complaint   Patient presents with    Hypothyroidism     13 weeks pregnant         History of Present Illness:     Magno Cartagena is a 32 y o  female with hypothyroidism presenting to the office today to re-establish care  Patient was last seen on 01/31/2020 by Dr Calry Soliman MD     Component      Latest Ref Rng & Units 4/1/2021 6/24/2022 8/20/2022           2:44 PM  8:25 AM  8:35 AM   TSH 3RD GENERATON      0 450 - 4 500 uIU/mL 8 490 (H) 6 320 (H) 1 260       She has a significant family history of thyroid disorders:    thyroid disorder- paternal aunt (hypothyroid and thyroid cancer), paternal GM (thyroid issues, taking medication), uncle thyroid cancer; great aunts; mother hypothyroidism and nodule  Recalls that her aunt and uncle had to be isolated from others    Patient is currently 13 weeks pregnant  This is her first pregnancy  She reports that she is currently feeling well after struggling with significant nausea during her first trimester  Onset: 2018 approximately  Etiology: Unknown  Symptoms: when hypothyroid- fatigue and occasional hair loss, menorrhagia, and cold intolerance  Denies weight gain, dry skin, constipation, or compressive symptoms  Medications: Just switched to 112 mcg in July    Takes first thing in the morning with water and waits 30-60 minutes to eat; waits 4 hours before taking prenatal vitamins     THYROID ULTRASOUND     INDICATION:    Acute thyroiditis      COMPARISON:  Ultrasound of the thyroid from February 6, 2020      TECHNIQUE:   Ultrasound of the thyroid was performed with a high frequency linear transducer in transverse and sagittal planes including volumetric imaging sweeps as well as traditional still imaging technique      FINDINGS:  Normal homogeneous smooth echotexture      Right lobe:  4 5 x 1 4 x 1 3 cm   4 1 mL  Left lobe:  3 6 x 0 9 x 1 2 cm  1 8 mL  Isthmus:  0 2 cm            IMPRESSION:     Normal examination     Incidentally, the left thyroid lobe is smaller in size than the right lobe and also smaller than noted on the prior study  This is of uncertain clinical significance      Patient Active Problem List   Diagnosis    ADD (attention deficit disorder)    Asthma during pregnancy    Scoliosis    Urticaria    Breast asymmetry    Chronic pain of left knee    Hemarthrosis of left knee    Hypothyroidism    Chronic tension-type headache    Ganglion, joint    Cutaneous lupus erythematosus    Vitamin D deficiency    12 weeks gestation of pregnancy      Past Medical History:   Diagnosis Date    Asthma     Hypothyroidism     Scoliosis       Past Surgical History:   Procedure Laterality Date    GANGLION CYST EXCISION      GA KNEE SCOPE,SINGLE MENISECTOMY Left 12/06/2018    Procedure: KNEE ARTHROSCOPY; SYNOVECTOMY; PATELLOFEMORAL CHONDROPLASTY;  Surgeon: Renny Garrison MD;  Location: [facility];  Service: Orthopedics    SHOULDER SURGERY      TONSILLECTOMY      WISDOM TOOTH EXTRACTION  05/01/2019      Family History   Problem Relation Age of Onset    Hypothyroidism Mother     Thyroid disease Mother     No Known Problems Father     Diabetes Maternal Grandmother    Hayley [place] Parkinson White syndrome Maternal Grandmother     No Known Problems Maternal Grandfather     Breast cancer Paternal Grandmother     Thyroid cancer Paternal Grandmother     Diabetes Paternal Grandmother     Basal cell carcinoma Paternal Grandfather     Thyroid cancer Paternal Aunt     Thyroid cancer Paternal Uncle      Social History     Tobacco Use    Smoking status: Never Smoker    Smokeless tobacco: Never Used   Substance Use Topics    Alcohol use: No     Allergies   Allergen Reactions    Ibuprofen Swelling       Current Outpatient Medications:     albuterol (PROAIR HFA) 90 mcg/act inhaler, ProAir HFA 90 mcg/actuation aerosol inhaler, Disp: , Rfl:     aspirin (ECOTRIN LOW STRENGTH) 81 mg EC tablet, Take 2 tablets (162 mg total) by mouth daily Stop at 36 weeks gestation  Take one tab while accompanied by another person for the first dose given prior allergy to ibuprofen, Disp: 60 tablet, Rfl: 5    levothyroxine 112 mcg tablet, , Disp: , Rfl:     Prenatal Vit-Fe Fumarate-FA (PRENATAL VITAMIN PO), Take by mouth, Disp: , Rfl:     fexofenadine (ALLEGRA) 60 MG tablet, Take 1 tablet (60 mg total) by mouth daily (Patient not taking: No sig reported), Disp: 30 tablet, Rfl: 0    ondansetron (Zofran ODT) 8 mg disintegrating tablet, Take 1 tablet (8 mg total) by mouth every 8 (eight) hours as needed for nausea or vomiting (Patient not taking: No sig reported), Disp: 20 tablet, Rfl: 0    Review of Systems   Constitutional: Negative for activity change, appetite change, fatigue and unexpected weight change  HENT: Negative for dental problem, sore throat, trouble swallowing and voice change  Eyes: Negative for visual disturbance  Respiratory: Negative for cough, chest tightness and shortness of breath  Cardiovascular: Negative for chest pain, palpitations and leg swelling  Gastrointestinal: Negative for constipation, diarrhea, nausea and vomiting  Endocrine: Positive for cold intolerance  Negative for heat intolerance  Genitourinary: Negative for frequency  Musculoskeletal: Negative for arthralgias, back pain, gait problem and myalgias  Skin: Negative for wound  Allergic/Immunologic: Positive for environmental allergies  Negative for food allergies  Neurological: Negative for dizziness, weakness, light-headedness, numbness and headaches  Psychiatric/Behavioral: Negative for decreased concentration, dysphoric mood and sleep disturbance  The patient is not nervous/anxious  Physical Exam:  Body mass index is 20 98 kg/m²    /72   Pulse 72   Ht 5' 8" (1 727 m)   Wt 62 6 kg (138 lb)   LMP 05/26/2022 (Exact Date)   SpO2 99%   BMI 20 98 kg/m²    Wt Readings from Last 3 Encounters:   08/26/22 62 6 kg (138 lb) 08/23/22 62 6 kg (138 lb)   08/10/22 61 7 kg (136 lb)       Physical Exam  Vitals reviewed  Constitutional:       General: She is not in acute distress  Appearance: She is well-developed  She is not ill-appearing  HENT:      Head: Normocephalic and atraumatic  Eyes:      Pupils: Pupils are equal, round, and reactive to light  Neck:      Thyroid: No thyromegaly  Cardiovascular:      Rate and Rhythm: Normal rate and regular rhythm  Pulses: Normal pulses  Heart sounds: Normal heart sounds  Pulmonary:      Effort: Pulmonary effort is normal       Breath sounds: Normal breath sounds  Abdominal:      General: Bowel sounds are normal  There is no distension  Palpations: Abdomen is soft  Tenderness: There is no abdominal tenderness  Musculoskeletal:      Cervical back: Normal range of motion and neck supple  Right lower leg: No edema  Left lower leg: No edema  Lymphadenopathy:      Cervical: No cervical adenopathy  Skin:     General: Skin is warm and dry  Capillary Refill: Capillary refill takes less than 2 seconds  Neurological:      Mental Status: She is alert and oriented to person, place, and time        Gait: Gait normal    Psychiatric:         Mood and Affect: Mood normal          Behavior: Behavior normal          Labs:       Lab Results   Component Value Date    CREATININE 0 82 10/16/2020    CREATININE 0 80 08/06/2019    CREATININE 0 90 03/10/2018    BUN 9 10/16/2020    K 4 6 10/16/2020     10/16/2020    CO2 29 10/16/2020     eGFR   Date Value Ref Range Status   10/16/2020 100 ml/min/1 73sq m Final       Lab Results   Component Value Date    HDL 51 06/24/2022    TRIG 69 06/24/2022       Lab Results   Component Value Date    ALT 34 10/16/2020    AST 18 10/16/2020    ALKPHOS 74 10/16/2020       Lab Results   Component Value Date    FREET4 0 89 06/24/2022         Impression & Plan:    Problem List Items Addressed This Visit        Endocrine Hypothyroidism - Primary     Counseled on basic pathophysiology of hypothyroidism  Will check thyroid antibodies panel to determine etiology  Currently stable on 112 mcg of levothyroxine  She is taking this consistently and correctly  Continue  Will evaluate TSH every 4 weeks for duration of pregnancy  Reviewed symptoms of hypothyroidism and hyperthyroidism/over supplementation  Patient is to notify me should she experience any of these  She knows to call me with any further questions or concerns  Follow-up in approximately 6 months  Counseled on the fact that she may need a dose adjustment in the postpartum phase  Relevant Orders    TSH, 3rd generation Lab Collect    T4, free Lab Collect    Thyroid Antibodies Panel Lab Collect          Orders Placed This Encounter   Procedures    TSH, 3rd generation Lab Collect     This is a patient instruction: This test is non-fasting  Please drink two glasses of water morning of bloodwork  Standing Status:   Standing     Number of Occurrences:   6     Standing Expiration Date:   8/26/2023    T4, free Lab Collect     Standing Status:   Standing     Number of Occurrences:   6     Standing Expiration Date:   8/26/2023       There are no Patient Instructions on file for this visit  Discussed with the patient and all questioned fully answered  She will call me if any problems arise  Follow-up appointment in 6 months       Counseled patient on diagnostic results, prognosis, risk and benefit of treatment options, instruction for management, importance of treatment compliance, Risk  factor reduction and impressions      JAMAL Noel

## 2022-08-26 ENCOUNTER — OFFICE VISIT (OUTPATIENT)
Dept: ENDOCRINOLOGY | Facility: CLINIC | Age: 27
End: 2022-08-26
Payer: COMMERCIAL

## 2022-08-26 VITALS
BODY MASS INDEX: 20.92 KG/M2 | DIASTOLIC BLOOD PRESSURE: 72 MMHG | OXYGEN SATURATION: 99 % | WEIGHT: 138 LBS | SYSTOLIC BLOOD PRESSURE: 110 MMHG | HEART RATE: 72 BPM | HEIGHT: 68 IN

## 2022-08-26 DIAGNOSIS — E03.9 HYPOTHYROIDISM, UNSPECIFIED TYPE: Primary | ICD-10-CM

## 2022-08-26 PROCEDURE — 99203 OFFICE O/P NEW LOW 30 MIN: CPT | Performed by: NURSE PRACTITIONER

## 2022-08-26 NOTE — LETTER
August 26, 2022     Patient: Jitendra Current  YOB: 1995  Date of Visit: 8/26/2022      To Whom it May Concern:    Sammy Montana is under my professional care  Pamelaadalid Grijalva was seen in my office on 8/26/2022  Pamela Grijalva may return to work on Monday 08/29/2022       If you have any questions or concerns, please don't hesitate to call           Sincerely,          Cheryle Mount, CRNP        CC: No Recipients

## 2022-08-26 NOTE — ASSESSMENT & PLAN NOTE
Counseled on basic pathophysiology of hypothyroidism  Will check thyroid antibodies panel to determine etiology  Currently stable on 112 mcg of levothyroxine  She is taking this consistently and correctly  Continue  Will evaluate TSH every 4 weeks for duration of pregnancy  Reviewed symptoms of hypothyroidism and hyperthyroidism/over supplementation  Patient is to notify me should she experience any of these  She knows to call me with any further questions or concerns  Follow-up in approximately 6 months  Counseled on the fact that she may need a dose adjustment in the postpartum phase

## 2022-09-09 ENCOUNTER — ROUTINE PRENATAL (OUTPATIENT)
Dept: OBGYN CLINIC | Facility: CLINIC | Age: 27
End: 2022-09-09

## 2022-09-09 VITALS — SYSTOLIC BLOOD PRESSURE: 104 MMHG | DIASTOLIC BLOOD PRESSURE: 60 MMHG | BODY MASS INDEX: 21.44 KG/M2 | WEIGHT: 141 LBS

## 2022-09-09 DIAGNOSIS — Z34.82 ENCOUNTER FOR SUPERVISION OF OTHER NORMAL PREGNANCY IN SECOND TRIMESTER: Primary | ICD-10-CM

## 2022-09-09 PROCEDURE — 87591 N.GONORRHOEAE DNA AMP PROB: CPT | Performed by: OBSTETRICS & GYNECOLOGY

## 2022-09-09 PROCEDURE — 87491 CHLMYD TRACH DNA AMP PROBE: CPT | Performed by: OBSTETRICS & GYNECOLOGY

## 2022-09-09 PROCEDURE — PNV: Performed by: OBSTETRICS & GYNECOLOGY

## 2022-09-10 LAB
C TRACH DNA SPEC QL NAA+PROBE: NEGATIVE
N GONORRHOEA DNA SPEC QL NAA+PROBE: NEGATIVE

## 2022-09-20 ENCOUNTER — APPOINTMENT (OUTPATIENT)
Dept: LAB | Facility: MEDICAL CENTER | Age: 27
End: 2022-09-20
Payer: COMMERCIAL

## 2022-09-20 DIAGNOSIS — Z34.82 ENCOUNTER FOR SUPERVISION OF OTHER NORMAL PREGNANCY IN SECOND TRIMESTER: ICD-10-CM

## 2022-09-20 DIAGNOSIS — E03.9 HYPOTHYROIDISM, UNSPECIFIED TYPE: ICD-10-CM

## 2022-09-20 LAB
T4 FREE SERPL-MCNC: 1.1 NG/DL (ref 0.76–1.46)
TSH SERPL DL<=0.05 MIU/L-ACNC: 1.92 UIU/ML (ref 0.45–4.5)

## 2022-09-20 PROCEDURE — 84439 ASSAY OF FREE THYROXINE: CPT | Performed by: NURSE PRACTITIONER

## 2022-09-20 PROCEDURE — 86376 MICROSOMAL ANTIBODY EACH: CPT

## 2022-09-20 PROCEDURE — 36415 COLL VENOUS BLD VENIPUNCTURE: CPT | Performed by: NURSE PRACTITIONER

## 2022-09-20 PROCEDURE — 82105 ALPHA-FETOPROTEIN SERUM: CPT

## 2022-09-20 PROCEDURE — 84443 ASSAY THYROID STIM HORMONE: CPT | Performed by: NURSE PRACTITIONER

## 2022-09-20 PROCEDURE — 86800 THYROGLOBULIN ANTIBODY: CPT

## 2022-09-21 LAB
THYROGLOB AB SERPL-ACNC: <1 IU/ML (ref 0–0.9)
THYROPEROXIDASE AB SERPL-ACNC: 31 IU/ML (ref 0–34)

## 2022-09-22 LAB
2ND TRIMESTER 4 SCREEN SERPL-IMP: NORMAL
AFP ADJ MOM SERPL: 0.69
AFP INTERP AMN-IMP: NORMAL
AFP INTERP SERPL-IMP: NORMAL
AFP INTERP SERPL-IMP: NORMAL
AFP SERPL-MCNC: 27.8 NG/ML
AGE AT DELIVERY: 27.3 YR
GA METHOD: NORMAL
GA: 16.7 WEEKS
IDDM PATIENT QL: NO
MULTIPLE PREGNANCY: NO
NEURAL TUBE DEFECT RISK FETUS: NORMAL %

## 2022-10-07 ENCOUNTER — ROUTINE PRENATAL (OUTPATIENT)
Dept: OBGYN CLINIC | Facility: CLINIC | Age: 27
End: 2022-10-07

## 2022-10-07 ENCOUNTER — HOSPITAL ENCOUNTER (OUTPATIENT)
Dept: NON INVASIVE DIAGNOSTICS | Facility: CLINIC | Age: 27
Discharge: HOME | End: 2022-10-07
Payer: COMMERCIAL

## 2022-10-07 VITALS
SYSTOLIC BLOOD PRESSURE: 108 MMHG | WEIGHT: 147.8 LBS | BODY MASS INDEX: 22.47 KG/M2 | OXYGEN SATURATION: 99 % | DIASTOLIC BLOOD PRESSURE: 56 MMHG | HEART RATE: 63 BPM

## 2022-10-07 DIAGNOSIS — O12.02 LEG SWELLING IN PREGNANCY IN SECOND TRIMESTER: ICD-10-CM

## 2022-10-07 DIAGNOSIS — U07.1 COVID-19 AFFECTING PREGNANCY IN SECOND TRIMESTER: ICD-10-CM

## 2022-10-07 DIAGNOSIS — Z3A.19 19 WEEKS GESTATION OF PREGNANCY: ICD-10-CM

## 2022-10-07 DIAGNOSIS — O98.512 COVID-19 AFFECTING PREGNANCY IN SECOND TRIMESTER: ICD-10-CM

## 2022-10-07 DIAGNOSIS — O12.02 LEG SWELLING IN PREGNANCY IN SECOND TRIMESTER: Primary | ICD-10-CM

## 2022-10-07 PROCEDURE — PNV: Performed by: PHYSICIAN ASSISTANT

## 2022-10-07 PROCEDURE — 93971 EXTREMITY STUDY: CPT

## 2022-10-07 PROCEDURE — 93971 EXTREMITY STUDY: CPT | Performed by: SURGERY

## 2022-10-07 NOTE — PROGRESS NOTES
Patient is a 31 YO  female presenting to the office at 19 1 weeks for routine OB care  BP: 108/56  TWlb  Fetal Movement: none yet  Feeling well today  Denies LOF, CTX, VB  Had some significant RLE swelling 2 days ago, improved since then  Denies redness  Will order unilateral duplex  Has anatomy scan scheduled  Had + home covid test at 18 weeks, recommend f/u growth US in third trimester  Reviewed precautions  Call for concern  RTO 4 weeks

## 2022-10-18 ENCOUNTER — TELEPHONE (OUTPATIENT)
Dept: OBGYN CLINIC | Facility: CLINIC | Age: 27
End: 2022-10-18

## 2022-10-18 NOTE — TELEPHONE ENCOUNTER
Patient called stating she is 20w5d and not feeling well, states this has been going on since Saturday  Today she was able to eat breakfast and a snack, was unable to keep her lunch down and did start with a headache  Per Vernadine Pilar, patient should try resting, drinking fluids and taking Tylenol  Monitor symptoms if become worse to call the office back  Patient agrees with recommendations and will call if needed

## 2022-10-20 ENCOUNTER — APPOINTMENT (OUTPATIENT)
Dept: LAB | Facility: MEDICAL CENTER | Age: 27
End: 2022-10-20
Payer: COMMERCIAL

## 2022-10-20 PROBLEM — Z3A.21 21 WEEKS GESTATION OF PREGNANCY: Status: ACTIVE | Noted: 2022-08-23

## 2022-10-20 NOTE — PROGRESS NOTES
The patient was seen today for an ultrasound  Please see ultrasound report (located under Ob Procedures) for additional details  Thank you very much for allowing us to participate in the care of this very nice patient  Should you have any questions, please do not hesitate to contact me  Joshua Padron MD [id number]  Attending Physician, Damari

## 2022-10-21 ENCOUNTER — ROUTINE PRENATAL (OUTPATIENT)
Dept: PERINATAL CARE | Facility: OTHER | Age: 27
End: 2022-10-21
Payer: COMMERCIAL

## 2022-10-21 VITALS
SYSTOLIC BLOOD PRESSURE: 122 MMHG | DIASTOLIC BLOOD PRESSURE: 60 MMHG | HEIGHT: 68 IN | HEART RATE: 73 BPM | BODY MASS INDEX: 23.19 KG/M2 | WEIGHT: 153 LBS

## 2022-10-21 DIAGNOSIS — Z36.3 ENCOUNTER FOR ANTENATAL SCREENING FOR MALFORMATION USING ULTRASOUND: Primary | ICD-10-CM

## 2022-10-21 DIAGNOSIS — Z3A.21 21 WEEKS GESTATION OF PREGNANCY: ICD-10-CM

## 2022-10-21 DIAGNOSIS — Z36.86 ENCOUNTER FOR ANTENATAL SCREENING FOR CERVICAL LENGTH: ICD-10-CM

## 2022-10-21 PROCEDURE — 76817 TRANSVAGINAL US OBSTETRIC: CPT | Performed by: OBSTETRICS & GYNECOLOGY

## 2022-10-21 PROCEDURE — 76805 OB US >/= 14 WKS SNGL FETUS: CPT | Performed by: OBSTETRICS & GYNECOLOGY

## 2022-10-21 PROCEDURE — 99213 OFFICE O/P EST LOW 20 MIN: CPT | Performed by: OBSTETRICS & GYNECOLOGY

## 2023-08-27 NOTE — PROGRESS NOTES
32 y o  Yina Boo female at 15w1d (Estimated Date of Delivery: 3/2/23) for PNV  Pre-Sammie Vitals    Flowsheet Row Most Recent Value   Prenatal Assessment    Prenatal Vitals    Blood Pressure 104/60   Weight - Scale 64 kg (141 lb)   Urine Albumin/Glucose    Dilation/Effacement/Station    Vaginal Drainage    Edema         TWG: Not found  Had one day so vomiting, but resolved  Reports overall her nausea has improved  Intermittent headaches, improved with tylenol      Ordered and discussed msAFP  Urine gc today
Patient would like to review recommendation from MFM of starting baby Aspirin  States she is feeling well, no complaints 
This was a shared visit with the RAMANA. I reviewed and verified the documentation and independently performed the documented:

## (undated) DEVICE — DRAPE SHEET THREE QUARTER

## (undated) DEVICE — CURITY NON-ADHERENT STRIPS: Brand: CURITY

## (undated) DEVICE — SUT ETHILON 3-0 FSLX 30 IN 1673H

## (undated) DEVICE — STOCKINETTE REGULAR

## (undated) DEVICE — LIGHT GLOVE GREEN

## (undated) DEVICE — TUBING SUCTION 5MM X 12 FT

## (undated) DEVICE — NEEDLE 25GA X 1 IN SAFETY GLIDE

## (undated) DEVICE — SPONGE PVP SCRUB WING STERILE

## (undated) DEVICE — BLADE SHAVER TORPEDO CRV 4MM 13MM COOLCUT

## (undated) DEVICE — ABDOMINAL PAD: Brand: DERMACEA

## (undated) DEVICE — GAUZE SPONGES,16 PLY: Brand: CURITY

## (undated) DEVICE — GLOVE INDICATOR PI UNDERGLOVE SZ 8.5 BLUE

## (undated) DEVICE — U-DRAPE: Brand: CONVERTORS

## (undated) DEVICE — TUBING ARTHROSCOPIC WAVE  MAIN PUMP

## (undated) DEVICE — SKIN MARKER DUAL TIP WITH RULER CAP, FLEXIBLE RULER AND LABELS: Brand: DEVON

## (undated) DEVICE — ACE WRAP 6 IN UNSTERILE

## (undated) DEVICE — GLOVE SRG BIOGEL 8

## (undated) DEVICE — ARTHROSCOPY FLOOR MAT

## (undated) DEVICE — INTENDED FOR TISSUE SEPARATION, AND OTHER PROCEDURES THAT REQUIRE A SHARP SURGICAL BLADE TO PUNCTURE OR CUT.: Brand: BARD-PARKER ® CARBON RIB-BACK BLADES

## (undated) DEVICE — BETHLEHEM UNIVERSAL  ARTHRO PK: Brand: CARDINAL HEALTH

## (undated) DEVICE — NEEDLE 25G X 1 1/2

## (undated) DEVICE — PADDING CAST 4 IN  COTTON STRL

## (undated) DEVICE — CHLORAPREP HI-LITE 26ML ORANGE